# Patient Record
Sex: FEMALE | Race: WHITE | URBAN - METROPOLITAN AREA
[De-identification: names, ages, dates, MRNs, and addresses within clinical notes are randomized per-mention and may not be internally consistent; named-entity substitution may affect disease eponyms.]

---

## 2021-10-12 ENCOUNTER — OFFICE VISIT (OUTPATIENT)
Dept: FAMILY MEDICINE CLINIC | Age: 36
End: 2021-10-12
Payer: OTHER GOVERNMENT

## 2021-10-12 VITALS
BODY MASS INDEX: 34.8 KG/M2 | HEIGHT: 63 IN | TEMPERATURE: 98 F | OXYGEN SATURATION: 95 % | RESPIRATION RATE: 16 BRPM | HEART RATE: 103 BPM | SYSTOLIC BLOOD PRESSURE: 123 MMHG | WEIGHT: 196.4 LBS | DIASTOLIC BLOOD PRESSURE: 89 MMHG

## 2021-10-12 DIAGNOSIS — Z76.89 ENCOUNTER TO ESTABLISH CARE: ICD-10-CM

## 2021-10-12 DIAGNOSIS — E55.9 VITAMIN D DEFICIENCY: ICD-10-CM

## 2021-10-12 DIAGNOSIS — I10 PRIMARY HYPERTENSION: ICD-10-CM

## 2021-10-12 DIAGNOSIS — Z86.718 HISTORY OF BLOOD CLOTS: ICD-10-CM

## 2021-10-12 DIAGNOSIS — M21.372 FOOT DROP, LEFT: ICD-10-CM

## 2021-10-12 DIAGNOSIS — Z13.220 SCREENING, LIPID: ICD-10-CM

## 2021-10-12 DIAGNOSIS — G62.9 NEUROPATHY: ICD-10-CM

## 2021-10-12 DIAGNOSIS — Z00.00 ANNUAL PHYSICAL EXAM: Primary | ICD-10-CM

## 2021-10-12 PROBLEM — Z90.49 HISTORY OF APPENDECTOMY: Status: ACTIVE | Noted: 2021-10-12

## 2021-10-12 PROBLEM — Q89.01 SPLEEN ABSENT: Status: ACTIVE | Noted: 2021-10-12

## 2021-10-12 PROCEDURE — 99385 PREV VISIT NEW AGE 18-39: CPT | Performed by: NURSE PRACTITIONER

## 2021-10-12 RX ORDER — OMEPRAZOLE 40 MG/1
CAPSULE, DELAYED RELEASE ORAL
COMMUNITY
End: 2022-05-11

## 2021-10-12 RX ORDER — HYDROCHLOROTHIAZIDE 25 MG/1
25 TABLET ORAL DAILY
COMMUNITY
End: 2022-08-09 | Stop reason: SDUPTHER

## 2021-10-12 NOTE — PATIENT INSTRUCTIONS
High Blood Pressure: Care Instructions  Overview     It's normal for blood pressure to go up and down throughout the day. But if it stays up, you have high blood pressure. Another name for high blood pressure is hypertension. Despite what a lot of people think, high blood pressure usually doesn't cause headaches or make you feel dizzy or lightheaded. It usually has no symptoms. But it does increase your risk of stroke, heart attack, and other problems. You and your doctor will talk about your risks of these problems based on your blood pressure. Your doctor will give you a goal for your blood pressure. Your goal will be based on your health and your age. Lifestyle changes, such as eating healthy and being active, are always important to help lower blood pressure. You might also take medicine to reach your blood pressure goal.  Follow-up care is a key part of your treatment and safety. Be sure to make and go to all appointments, and call your doctor if you are having problems. It's also a good idea to know your test results and keep a list of the medicines you take. How can you care for yourself at home? Medical treatment  · If you stop taking your medicine, your blood pressure will go back up. You may take one or more types of medicine to lower your blood pressure. Be safe with medicines. Take your medicine exactly as prescribed. Call your doctor if you think you are having a problem with your medicine. · Talk to your doctor before you start taking aspirin every day. Aspirin can help certain people lower their risk of a heart attack or stroke. But taking aspirin isn't right for everyone, because it can cause serious bleeding. · See your doctor regularly. You may need to see the doctor more often at first or until your blood pressure comes down. · If you are taking blood pressure medicine, talk to your doctor before you take decongestants or anti-inflammatory medicine, such as ibuprofen.  Some of these medicines can raise blood pressure. · Learn how to check your blood pressure at home. Lifestyle changes  · Stay at a healthy weight. This is especially important if you put on weight around the waist. Losing even 10 pounds can help you lower your blood pressure. · If your doctor recommends it, get more exercise. Walking is a good choice. Bit by bit, increase the amount you walk every day. Try for at least 30 minutes on most days of the week. You also may want to swim, bike, or do other activities. · Avoid or limit alcohol. Talk to your doctor about whether you can drink any alcohol. · Try to limit how much sodium you eat to less than 2,300 milligrams (mg) a day. Your doctor may ask you to try to eat less than 1,500 mg a day. · Eat plenty of fruits (such as bananas and oranges), vegetables, legumes, whole grains, and low-fat dairy products. · Lower the amount of saturated fat in your diet. Saturated fat is found in animal products such as milk, cheese, and meat. Limiting these foods may help you lose weight and also lower your risk for heart disease. · Do not smoke. Smoking increases your risk for heart attack and stroke. If you need help quitting, talk to your doctor about stop-smoking programs and medicines. These can increase your chances of quitting for good. When should you call for help? Call 911  anytime you think you may need emergency care. This may mean having symptoms that suggest that your blood pressure is causing a serious heart or blood vessel problem. Your blood pressure may be over 180/120. For example, call 911 if:    · You have symptoms of a heart attack. These may include:  ? Chest pain or pressure, or a strange feeling in the chest.  ? Sweating. ? Shortness of breath. ? Nausea or vomiting. ? Pain, pressure, or a strange feeling in the back, neck, jaw, or upper belly or in one or both shoulders or arms. ? Lightheadedness or sudden weakness.   ? A fast or irregular heartbeat.     · You have symptoms of a stroke. These may include:  ? Sudden numbness, tingling, weakness, or loss of movement in your face, arm, or leg, especially on only one side of your body. ? Sudden vision changes. ? Sudden trouble speaking. ? Sudden confusion or trouble understanding simple statements. ? Sudden problems with walking or balance. ? A sudden, severe headache that is different from past headaches.     · You have severe back or belly pain. Do not wait until your blood pressure comes down on its own. Get help right away. Call your doctor now or seek immediate care if:    · Your blood pressure is much higher than normal (such as 180/120 or higher), but you don't have symptoms.     · You think high blood pressure is causing symptoms, such as:  ? Severe headache.  ? Blurry vision. Watch closely for changes in your health, and be sure to contact your doctor if:    · Your blood pressure measures higher than your doctor recommends at least 2 times. That means the top number is higher or the bottom number is higher, or both.     · You think you may be having side effects from your blood pressure medicine. Where can you learn more? Go to http://www.gray.com/  Enter [id number] in the search box to learn more about \"High Blood Pressure: Care Instructions. \"  Current as of: April 29, 2021               Content Version: 13.0  © 2006-2021 [employer]. Care instructions adapted under license by [employer] (which disclaims liability or warranty for this information). If you have questions about a medical condition or this instruction, always ask your healthcare professional. [place] [address] any warranty or liability for your use of this information.

## 2021-10-12 NOTE — PROGRESS NOTES
Assessment/Plan:     Diagnoses and all orders for this visit:    1. Annual physical exam  -     CBC WITH AUTOMATED DIFF; Future  -     URINALYSIS W/ RFLX MICROSCOPIC; Future  - Stable, preventative screenings discussed will get labs today and referrals as requested to establish after moving. Had patient fill out release of records     2. Neuropathy  -     REFERRAL TO NEUROLOGY  - Unchanged, referral to neurology to get established after moving to [facility]    3. Foot drop, left  -     REFERRAL TO NEUROLOGY  - As above    4. Vitamin D deficiency  -     VITAMIN D, 25 HYDROXY; Future  -Presumed stable labs today     5. Primary hypertension  -     METABOLIC PANEL, COMPREHENSIVE; Future  -     MICROALBUMIN, UR, RAND W/ MICROALB/CREAT RATIO; Future  -Stable, continue current therapy  labs today    6. Encounter to establish care    7. Screening, lipid  -     LIPID PANEL; Future  - Presumed stable labs today    8. History of blood clots  -     rivaroxaban (Xarelto) 20 mg tab tablet; Take 1 Tablet by mouth daily.  - Stable, continue current therapy refill today            Discussed expected course/resolution/complications of diagnosis in detail with patient. Medication risks/benefits/costs/interactions/alternatives discussed with patient. Pt was given after visit summary which includes diagnoses, current medications & vitals. Pt expressed understanding with the diagnosis and plan        Subjective:      Stan Temple is a 31 y.o. female who presents for had concerns including Establish Care and Other (Patient requesting a referral for Nuerology ). About 2 years ago had a medical emergency, found unresponsive, intubated for 40 days, 3 months in rehab. Had swelling in brain, fluid in lungs. History  of pancreatitis from alcohol use. When woke up from rehab had neuropathy in hands and legs, worse in the left. 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 Moved here from [place]. No records present at visit    Annual physical.  Has not seen dentist in a couple of years. Vision exam up to date    Plans to establish with GYN. BP at goal today. Current Outpatient Medications   Medication Sig Dispense Refill    omeprazole (PRILOSEC) 40 mg capsule omeprazole 40 mg capsule,delayed release      hydroCHLOROthiazide (HYDRODIURIL) 25 mg tablet Take 25 mg by mouth daily.  rivaroxaban (Xarelto) 20 mg tab tablet Take 1 Tablet by mouth daily. 90 Tablet 1       No Known Allergies  History reviewed. No pertinent past medical history. History reviewed. No pertinent surgical history. Family History   Problem Relation Age of Onset    Ovarian Cancer Mother     Hypertension Father      Social History     Socioeconomic History    Marital status:      Spouse name: Not on file    Number of children: Not on file    Years of education: Not on file    Highest education level: Not on file   Occupational History    Not on file   Tobacco Use    Smoking status: Never Smoker    Smokeless tobacco: Never Used   Vaping Use    Vaping Use: Never used   Substance and Sexual Activity    Alcohol use: Yes     Alcohol/week: 3.0 standard drinks     Types: 1 Glasses of wine, 1 Cans of beer, 1 Shots of liquor per week     Comment: ocassionally    Drug use: Never    Sexual activity: Not on file   Other Topics Concern    Not on file   Social History Narrative    Not on file     Social Determinants of Health     Financial Resource Strain:     Difficulty of Paying Living Expenses:    Food Insecurity:     Worried About Running Out of Food in the Last Year:     Ran Out of Food in the Last Year:    Transportation Needs:     Lack of Transportation (Medical):      Lack of Transportation (Non-Medical):    Physical Activity:     Days of Exercise per Week:     Minutes of Exercise per Session:    Stress:     Feeling of Stress :    Social Connections:  Frequency of Communication with Friends and Family:     Frequency of Social Gatherings with Friends and Family:     Attends Yarsanism Services:     Active Member of Clubs or Organizations:     Attends Club or Organization Meetings:     Marital Status:    Intimate Partner Violence:     Fear of Current or Ex-Partner:     Emotionally Abused:     Physically Abused:     Sexually Abused:        HPI      ROS:   Review of Systems   Constitutional: Negative for chills, fever and malaise/fatigue. Eyes: Negative for blurred vision. Respiratory: Negative for cough and shortness of breath. Cardiovascular: Negative for chest pain, palpitations and leg swelling. Genitourinary: Negative for dysuria and urgency. Neurological: Positive for weakness. Negative for dizziness and headaches. Objective:     Visit Vitals  /89 (BP 1 Location: Left upper arm, BP Patient Position: Sitting, BP Cuff Size: Adult long)   Pulse (!) 103   Temp 98 °F (36.7 °C) (Temporal)   Resp 16   Ht 5' 3\" (1.6 m)   Wt 196 lb 6.4 oz (89.1 kg)   SpO2 95%   BMI 34.79 kg/m²         Vitals and Nurse Documentation reviewed. Physical Exam  Constitutional:       Appearance: Normal appearance. HENT:      Head: Normocephalic and atraumatic. Right Ear: Tympanic membrane normal.      Left Ear: Tympanic membrane normal.      Nose: Nose normal.      Mouth/Throat:      Dentition: Normal dentition. Eyes:      General:         Right eye: No discharge. Left eye: No discharge. Conjunctiva/sclera:      Right eye: Right conjunctiva is not injected. Left eye: Left conjunctiva is not injected. Pupils: Pupils are equal, round, and reactive to light. Neck:      Thyroid: No thyroid mass or thyromegaly. Vascular: No carotid bruit. Cardiovascular:      Rate and Rhythm: Normal rate and regular rhythm. Pulses:           Dorsalis pedis pulses are 2+ on the right side and 2+ on the left side.         Posterior tibial pulses are 2+ on the right side and 2+ on the left side. Heart sounds: S1 normal and S2 normal. No murmur heard. No friction rub. No gallop. Pulmonary:      Breath sounds: Normal breath sounds. Abdominal:      General: Bowel sounds are normal. There is no distension. Palpations: There is no mass. Tenderness: There is no abdominal tenderness. Musculoskeletal:         General: Normal range of motion. Cervical back: Neck supple. Lymphadenopathy:      Cervical: No cervical adenopathy. Skin:     General: Skin is warm and dry. Findings: No rash. Neurological:      Mental Status: She is alert. Cranial Nerves: Cranial nerves are intact. Sensory: Sensation is intact. Motor: Weakness present. Gait: Gait abnormal.      Comments: Using walker today, brace on left lower extremity   Psychiatric:         Mood and Affect: Mood and affect normal.         No results found for this or any previous visit.

## 2021-10-12 NOTE — PROGRESS NOTES
Identified pt with two pt identifiers(name and ). Reviewed record in preparation for visit and have obtained necessary documentation. Chief Complaint   Patient presents with   [facility] Other     Patient requesting a referral for [facility] Due   Topic    Hepatitis C Screening     COVID-19 Vaccine (1)    DTaP/Tdap/Td series (1 - Tdap)    Cervical cancer screen     Flu Vaccine (1)       Coordination of Care Questionnaire:  :   1) Have you been to an emergency room, urgent care, or hospitalized since your last visit? If yes, where when, and reason for visit? no    2. Have seen or consulted any other health care provider since your last visit? If yes, where when, and reason for visit?  no        Patient is accompanied by self I have received verbal consent from Debra Borjas to discuss any/all medical information while they are present in the room.

## 2021-10-18 LAB
25(OH)D3 SERPL-MCNC: 38.6 NG/ML (ref 30–100)
ALBUMIN SERPL-MCNC: 3.8 G/DL (ref 3.5–5)
ALBUMIN/GLOB SERPL: 1 {RATIO} (ref 1.1–2.2)
ALP SERPL-CCNC: 188 U/L (ref 45–117)
ALT SERPL-CCNC: 187 U/L (ref 12–78)
ANION GAP SERPL CALC-SCNC: 14 MMOL/L (ref 5–15)
APPEARANCE UR: CLEAR
AST SERPL-CCNC: 202 U/L (ref 15–37)
BACTERIA URNS QL MICRO: ABNORMAL /HPF
BASOPHILS # BLD: 0.1 K/UL (ref 0–0.1)
BASOPHILS NFR BLD: 1 % (ref 0–1)
BILIRUB SERPL-MCNC: 1 MG/DL (ref 0.2–1)
BILIRUB UR QL: NEGATIVE
BUN SERPL-MCNC: 8 MG/DL (ref 6–20)
BUN/CREAT SERPL: 11 (ref 12–20)
CALCIUM SERPL-MCNC: 9.6 MG/DL (ref 8.5–10.1)
CHLORIDE SERPL-SCNC: 85 MMOL/L (ref 97–108)
CHOLEST SERPL-MCNC: 163 MG/DL
CO2 SERPL-SCNC: 22 MMOL/L (ref 21–32)
COLOR UR: ABNORMAL
CREAT SERPL-MCNC: 0.72 MG/DL (ref 0.55–1.02)
CREAT UR-MCNC: 310 MG/DL
DIFFERENTIAL METHOD BLD: ABNORMAL
EOSINOPHIL # BLD: 0 K/UL (ref 0–0.4)
EOSINOPHIL NFR BLD: 0 % (ref 0–7)
EPITH CASTS URNS QL MICRO: ABNORMAL /LPF
ERYTHROCYTE [DISTWIDTH] IN BLOOD BY AUTOMATED COUNT: 19.4 % (ref 11.5–14.5)
GLOBULIN SER CALC-MCNC: 4 G/DL (ref 2–4)
GLUCOSE SERPL-MCNC: 133 MG/DL (ref 65–100)
GLUCOSE UR STRIP.AUTO-MCNC: NEGATIVE MG/DL
HCT VFR BLD AUTO: 34.4 % (ref 35–47)
HDLC SERPL-MCNC: 103 MG/DL
HDLC SERPL: 1.6 {RATIO} (ref 0–5)
HGB BLD-MCNC: 11.1 G/DL (ref 11.5–16)
HGB UR QL STRIP: ABNORMAL
HYALINE CASTS URNS QL MICRO: ABNORMAL /LPF (ref 0–5)
IMM GRANULOCYTES # BLD AUTO: 0 K/UL (ref 0–0.04)
IMM GRANULOCYTES NFR BLD AUTO: 1 % (ref 0–0.5)
KETONES UR QL STRIP.AUTO: 15 MG/DL
LDLC SERPL CALC-MCNC: 49.4 MG/DL (ref 0–100)
LEUKOCYTE ESTERASE UR QL STRIP.AUTO: ABNORMAL
LYMPHOCYTES # BLD: 1.1 K/UL (ref 0.8–3.5)
LYMPHOCYTES NFR BLD: 13 % (ref 12–49)
MCH RBC QN AUTO: 23.8 PG (ref 26–34)
MCHC RBC AUTO-ENTMCNC: 32.3 G/DL (ref 30–36.5)
MCV RBC AUTO: 73.7 FL (ref 80–99)
MICROALBUMIN UR-MCNC: 20.9 MG/DL
MICROALBUMIN/CREAT UR-RTO: 67 MG/G (ref 0–30)
MONOCYTES # BLD: 1.2 K/UL (ref 0–1)
MONOCYTES NFR BLD: 14 % (ref 5–13)
NEUTS SEG # BLD: 6 K/UL (ref 1.8–8)
NEUTS SEG NFR BLD: 71 % (ref 32–75)
NITRITE UR QL STRIP.AUTO: NEGATIVE
NRBC # BLD: 0 K/UL (ref 0–0.01)
NRBC BLD-RTO: 0 PER 100 WBC
PH UR STRIP: 7 [PH] (ref 5–8)
PLATELET # BLD AUTO: 338 K/UL (ref 150–400)
PMV BLD AUTO: 11.1 FL (ref 8.9–12.9)
POTASSIUM SERPL-SCNC: 3.2 MMOL/L (ref 3.5–5.1)
PROT SERPL-MCNC: 7.8 G/DL (ref 6.4–8.2)
PROT UR STRIP-MCNC: 100 MG/DL
RBC # BLD AUTO: 4.67 M/UL (ref 3.8–5.2)
RBC #/AREA URNS HPF: ABNORMAL /HPF (ref 0–5)
SODIUM SERPL-SCNC: 121 MMOL/L (ref 136–145)
SP GR UR REFRACTOMETRY: 1.02 (ref 1–1.03)
TRIGL SERPL-MCNC: 53 MG/DL (ref ?–150)
UROBILINOGEN UR QL STRIP.AUTO: 0.2 EU/DL (ref 0.2–1)
VLDLC SERPL CALC-MCNC: 10.6 MG/DL
WBC # BLD AUTO: 8.4 K/UL (ref 3.6–11)
WBC URNS QL MICRO: ABNORMAL /HPF (ref 0–4)

## 2021-10-19 NOTE — PROGRESS NOTES
Please have her make a follow-up appointment for elevated liver enzymes, protein in her urine elevated blood sugar and have her hold the hydrochlorothiazide for couple days as her sodium and potassium are low. She needs to get them within a week.   And monitor her blood pressure while she is off the hydrochlorothiazide

## 2021-10-29 ENCOUNTER — DOCUMENTATION ONLY (OUTPATIENT)
Dept: FAMILY MEDICINE CLINIC | Age: 36
End: 2021-10-29

## 2021-11-03 NOTE — PROGRESS NOTES
Authorization Health Release Forms completed and faxed by 10/29/2021 to   [facility] Physician- (f): [phone]    Confirmation: OK

## 2022-02-08 ENCOUNTER — OFFICE VISIT (OUTPATIENT)
Dept: FAMILY MEDICINE CLINIC | Age: 37
End: 2022-02-08
Payer: OTHER GOVERNMENT

## 2022-02-08 VITALS
HEIGHT: 63 IN | SYSTOLIC BLOOD PRESSURE: 129 MMHG | RESPIRATION RATE: 16 BRPM | BODY MASS INDEX: 31.36 KG/M2 | OXYGEN SATURATION: 97 % | TEMPERATURE: 97.9 F | DIASTOLIC BLOOD PRESSURE: 92 MMHG | WEIGHT: 177 LBS | HEART RATE: 101 BPM

## 2022-02-08 DIAGNOSIS — D64.9 ANEMIA, UNSPECIFIED TYPE: ICD-10-CM

## 2022-02-08 DIAGNOSIS — I10 PRIMARY HYPERTENSION: ICD-10-CM

## 2022-02-08 DIAGNOSIS — R73.01 IMPAIRED FASTING GLUCOSE: ICD-10-CM

## 2022-02-08 DIAGNOSIS — N92.6 ABNORMAL MENSTRUAL PERIODS: Primary | ICD-10-CM

## 2022-02-08 DIAGNOSIS — E87.6 HYPOKALEMIA: ICD-10-CM

## 2022-02-08 PROCEDURE — 99214 OFFICE O/P EST MOD 30 MIN: CPT | Performed by: NURSE PRACTITIONER

## 2022-02-08 RX ORDER — CHOLECALCIFEROL (VITAMIN D3) 25 MCG
TABLET,CHEWABLE ORAL
COMMUNITY
End: 2022-08-09 | Stop reason: ALTCHOICE

## 2022-02-08 NOTE — PROGRESS NOTES
Assessment/Plan:     Diagnoses and all orders for this visit:    1. Abnormal menstrual periods  -     REFERRAL TO GYNECOLOGY  - Worsening, referral to GYN for further evaluation and treatment. Advised patient on the supplement vitex berry to try    2. Anemia, unspecified type  -     CBC WITH AUTOMATED DIFF; Future  - Presumed stable labs today    3. Impaired fasting glucose  -     HEMOGLOBIN A1C WITH EAG; Future  -     CBC WITH AUTOMATED DIFF; Future  - Presumed stable labs today    4. Hypokalemia  -     METABOLIC PANEL, COMPREHENSIVE; Future  - Presumed stable labs today    5. Primary hypertension  - Elevated today, advised patient to check blood pressure for 3 to 4 weeks and follow-up in 4 weeks with blood pressure readings. Follow-up and Dispositions    · Return in about 4 weeks (around 3/8/2022) for Follow Up. Discussed expected course/resolution/complications of diagnosis in detail with patient. Medication risks/benefits/costs/interactions/alternatives discussed with patient. Pt was given after visit summary which includes diagnoses, current medications & vitals. Pt expressed understanding with the diagnosis and plan        Subjective:      Mac Kumari is a 31 y.o. female who presents for had concerns including Labs (requesting blood work- ) and Other (Requsting a referral for Nuerologist ). Here today for follow up and lab work    History of foot drop needs to see neurology    States not having a regular period, will have an extremely light period every month. Feels like hormones are very bad. Going on 3-4 months. More emotional.  States has taken numerous pregnancy test and normal.   Not on birth control, has a 8year old    HTN  Elevated today. Checks at home. 120/90. States has been stressed due to  is going to [place].    Denies CP, SOB, palpitations, leg swelling    Current Outpatient Medications   Medication Sig Dispense Refill    cyanocobalamin, vitamin B-12, 2,500 mcg tab tablet cyanocobalamin (vitamin B-12) 2,500 mcg tablet   Take 1 tablet every day by oral route.  omeprazole (PRILOSEC) 40 mg capsule omeprazole 40 mg capsule,delayed release      hydroCHLOROthiazide (HYDRODIURIL) 25 mg tablet Take 25 mg by mouth daily.  rivaroxaban (Xarelto) 20 mg tab tablet Take 1 Tablet by mouth daily. 90 Tablet 1       Allergies   Allergen Reactions    Other Medication Hives     Sulfa drugs     History reviewed. No pertinent past medical history. History reviewed. No pertinent surgical history. Family History   Problem Relation Age of Onset    Ovarian Cancer Mother     Hypertension Father      Social History     Socioeconomic History    Marital status:      Spouse name: Not on file    Number of children: Not on file    Years of education: Not on file    Highest education level: Not on file   Occupational History    Not on file   Tobacco Use    Smoking status: Never Smoker    Smokeless tobacco: Never Used   Vaping Use    Vaping Use: Never used   Substance and Sexual Activity    Alcohol use: Yes     Alcohol/week: 3.0 standard drinks     Types: 1 Glasses of wine, 1 Cans of beer, 1 Shots of liquor per week     Comment: ocassionally    Drug use: Never    Sexual activity: Not on file   Other Topics Concern    Not on file   Social History Narrative    Not on file     Social Determinants of Health     Financial Resource Strain:     Difficulty of Paying Living Expenses: Not on file   Food Insecurity:     Worried About Running Out of Food in the Last Year: Not on file    Michael of Food in the Last Year: Not on file   Transportation Needs:     Lack of Transportation (Medical): Not on file    Lack of Transportation (Non-Medical):  Not on file   Physical Activity:     Days of Exercise per Week: Not on file    Minutes of Exercise per Session: Not on file   Stress:     Feeling of Stress : Not on file   Social Connections:     Frequency of Communication with Friends and Family: Not on file    Frequency of Social Gatherings with Friends and Family: Not on file    Attends Religion Services: Not on file    Active Member of Clubs or Organizations: Not on file    Attends Club or Organization Meetings: Not on file    Marital Status: Not on file   Intimate Partner Violence:     Fear of Current or Ex-Partner: Not on file    Emotionally Abused: Not on file    Physically Abused: Not on file    Sexually Abused: Not on file   Housing Stability:     Unable to Pay for Housing in the Last Year: Not on file    Number of [place] in the Last Year: Not on file    Unstable Housing in the Last Year: Not on file       HPI      ROS:   Review of Systems   Constitutional: Negative for chills, fever and malaise/fatigue. Eyes: Negative for blurred vision. Respiratory: Negative for cough and shortness of breath. Cardiovascular: Negative for chest pain, palpitations and leg swelling. Neurological: Negative for dizziness and headaches. Psychiatric/Behavioral:        \"emotional\"       Objective:     Visit Vitals  BP (!) 129/92 (BP 1 Location: Left upper arm, BP Patient Position: Sitting, BP Cuff Size: Adult)   Pulse (!) 101   Temp 97.9 °F (36.6 °C) (Temporal)   Resp 16   Ht 5' 3\" (1.6 m)   Wt 177 lb (80.3 kg)   SpO2 97%   BMI 31.35 kg/m²         Vitals and Nurse Documentation reviewed. Physical Exam  Constitutional:       General: She is not in acute distress. Appearance: She is not diaphoretic. HENT:      Head: Normocephalic and atraumatic. Cardiovascular:      Rate and Rhythm: Normal rate and regular rhythm. Heart sounds: Normal heart sounds. No murmur heard. No friction rub. No gallop. Pulmonary:      Effort: Pulmonary effort is normal. No respiratory distress. Breath sounds: Normal breath sounds. No wheezing or rales. Skin:     General: Skin is warm and dry. Coloration: Skin is not pale.    Neurological:      Mental Status: She is alert and oriented to person, place, and time. Psychiatric:         Mood and Affect: Affect normal. Mood is not anxious or depressed. Behavior: Behavior is not agitated. Thought Content: Thought content does not include homicidal or suicidal ideation. Results for orders placed or performed in visit on 78/23/20   METABOLIC PANEL, COMPREHENSIVE   Result Value Ref Range    Sodium 132 (L) 136 - 145 mmol/L    Potassium 4.5 3.5 - 5.1 mmol/L    Chloride 96 (L) 97 - 108 mmol/L    CO2 26 21 - 32 mmol/L    Anion gap 10 5 - 15 mmol/L    Glucose 114 (H) 65 - 100 mg/dL    BUN 9 6 - 20 MG/DL    Creatinine 0.72 0.55 - 1.02 MG/DL    BUN/Creatinine ratio 13 12 - 20      GFR est AA >60 >60 ml/min/1.73m2    GFR est non-AA >60 >60 ml/min/1.73m2    Calcium 10.0 8.5 - 10.1 MG/DL    Bilirubin, total 1.6 (H) 0.2 - 1.0 MG/DL    ALT (SGPT) 66 12 - 78 U/L    AST (SGOT) 97 (H) 15 - 37 U/L    Alk. phosphatase 136 (H) 45 - 117 U/L    Protein, total 8.4 (H) 6.4 - 8.2 g/dL    Albumin 4.3 3.5 - 5.0 g/dL    Globulin 4.1 (H) 2.0 - 4.0 g/dL    A-G Ratio 1.0 (L) 1.1 - 2.2     CBC WITH AUTOMATED DIFF   Result Value Ref Range    WBC 10.4 3.6 - 11.0 K/uL    RBC 4.25 3.80 - 5.20 M/uL    HGB 11.9 11.5 - 16.0 g/dL    HCT 38.4 35.0 - 47.0 %    MCV 90.4 80.0 - 99.0 FL    MCH 28.0 26.0 - 34.0 PG    MCHC 31.0 30.0 - 36.5 g/dL    RDW 20.3 (H) 11.5 - 14.5 %    PLATELET 247 (H) 505 - 400 K/uL    MPV 11.3 8.9 - 12.9 FL    NRBC 0.0 0  WBC    ABSOLUTE NRBC 0.00 0.00 - 0.01 K/uL    NEUTROPHILS 65 32 - 75 %    LYMPHOCYTES 21 12 - 49 %    MONOCYTES 13 5 - 13 %    EOSINOPHILS 0 0 - 7 %    BASOPHILS 1 0 - 1 %    IMMATURE GRANULOCYTES 0 0.0 - 0.5 %    ABS. NEUTROPHILS 6.7 1.8 - 8.0 K/UL    ABS. LYMPHOCYTES 2.2 0.8 - 3.5 K/UL    ABS. MONOCYTES 1.4 (H) 0.0 - 1.0 K/UL    ABS. EOSINOPHILS 0.0 0.0 - 0.4 K/UL    ABS. BASOPHILS 0.1 0.0 - 0.1 K/UL    ABS. IMM.  GRANS. 0.0 0.00 - 0.04 K/UL    DF SMEAR SCANNED      RBC COMMENTS ANISOCYTOSIS  2+ RBC COMMENTS POIKILOCYTOSIS  PRESENT        RBC COMMENTS KVNG CELLS  PRESENT       HEMOGLOBIN A1C WITH EAG   Result Value Ref Range    Hemoglobin A1c 5.7 (H) 4.0 - 5.6 %    Est. average glucose 117 mg/dL

## 2022-02-08 NOTE — PROGRESS NOTES
Identified pt with two pt identifiers(name and ). Reviewed record in preparation for visit and have obtained necessary documentation. Chief Complaint   Patient presents with   [facility] [place]     requesting blood work-     Other     Requsting a referral for [employer] Due   Topic    Hepatitis C Screening     Cervical cancer screen     MenB Meningococcal topic (2 of 4 - Increased Risk Bexsero 2-dose series)    Flu Vaccine (1)    COVID-19 Vaccine (3 - Booster for Moderna series)       Coordination of Care Questionnaire:  :   1) Have you been to an emergency room, urgent care, or hospitalized since your last visit? If yes, where when, and reason for visit? no      2. Have seen or consulted any other health care provider since your last visit? If yes, where when, and reason for visit? no        Patient is accompanied by self I have received verbal consent from Fernando Baird to discuss any/all medical information while they are present in the room.

## 2022-02-08 NOTE — PATIENT INSTRUCTIONS
Anemia: Care Instructions  Your Care Instructions     Anemia is a low level of red blood cells, which carry oxygen throughout your body. Many things can cause anemia. Lack of iron is one of the most common causes. Your body needs iron to make hemoglobin, a substance in red blood cells that carries oxygen from the lungs to your body's cells. Without enough iron, the body produces fewer and smaller red blood cells. As a result, your body's cells do not get enough oxygen, and you feel tired and weak. And you may have trouble concentrating. Bleeding is the most common cause of a lack of iron. You may have heavy menstrual bleeding or bleeding caused by conditions such as ulcers, hemorrhoids, or cancer. Regular use of aspirin or other anti-inflammatory medicines (such as ibuprofen) also can cause bleeding in some people. A lack of iron in your diet also can cause anemia, especially at times when the body needs more iron, such as during pregnancy, infancy, and the teen years. Your doctor may have prescribed iron pills. It may take several months of treatment for your iron levels to return to normal. Your doctor also may suggest that you eat foods that are rich in iron, such as meat and beans. There are many other causes of anemia. It is not always due to a lack of iron. Finding the specific cause of your anemia will help your doctor find the right treatment for you. Follow-up care is a key part of your treatment and safety. Be sure to make and go to all appointments, and call your doctor if you are having problems. It's also a good idea to know your test results and keep a list of the medicines you take. How can you care for yourself at home? · Take your medicines exactly as prescribed. Call your doctor if you think you are having a problem with your medicine. · If your doctor recommends iron pills, take them as directed:  ? Try to take the pills on an empty stomach about 1 hour before or 2 hours after meals. But you may need to take iron with food to avoid an upset stomach. ? Do not take antacids or drink milk or caffeine drinks (such as coffee, tea, or cola) at the same time or within 2 hours of the time that you take your iron. They can make it hard for your body to absorb the iron. ? Vitamin C (from food or supplements) helps your body absorb iron. Try taking iron pills with a glass of orange juice or some other food that is high in vitamin C, such as citrus fruits. ? Iron pills may cause stomach problems, such as heartburn, nausea, diarrhea, constipation, and cramps. Be sure to drink plenty of fluids, and include fruits, vegetables, and fiber in your diet each day. Iron pills often make your bowel movements dark or green. ? If you forget to take an iron pill, do not take a double dose of iron the next time you take a pill. ? Keep iron pills out of the reach of small children. An overdose of iron can be very dangerous. · Follow your doctor's advice about eating iron-rich foods. These include red meat, shellfish, poultry, eggs, beans, raisins, whole-grain bread, and leafy green vegetables. · Steam vegetables to help them keep their iron content. When should you call for help? Call 911 anytime you think you may need emergency care. For example, call if:    · You have symptoms of a heart attack. These may include:  ? Chest pain or pressure, or a strange feeling in the chest.  ? Sweating. ? Shortness of breath. ? Nausea or vomiting. ? Pain, pressure, or a strange feeling in the back, neck, jaw, or upper belly or in one or both shoulders or arms. ? Lightheadedness or sudden weakness. ? A fast or irregular heartbeat. After you call 911, the  may tell you to chew 1 adult-strength or 2 to 4 low-dose aspirin. Wait for an ambulance. Do not try to drive yourself.     · You passed out (lost consciousness).    Call your doctor now or seek immediate medical care if:    · You have new or increased shortness of breath.     · You are dizzy or lightheaded, or you feel like you may faint.     · Your fatigue and weakness continue or get worse.     · You have any abnormal bleeding, such as:  ? Nosebleeds. ? Vaginal bleeding that is different (heavier, more frequent, at a different time of the month) than what you are used to.  ? Bloody or black stools, or rectal bleeding. ? Bloody or pink urine. Watch closely for changes in your health, and be sure to contact your doctor if:    · You do not get better as expected. Where can you learn more? Go to http://www.stevens.com/  Enter R301 in the search box to learn more about \"Anemia: Care Instructions. \"  Current as of: April 29, 2021               Content Version: 13.0  © 8697-8538 [employer], [employer]. Care instructions adapted under license by [employer] (which disclaims liability or warranty for this information). If you have questions about a medical condition or this instruction, always ask your healthcare professional. [place] [address] any warranty or liability for your use of this information.

## 2022-02-09 LAB
ALBUMIN SERPL-MCNC: 4.3 G/DL (ref 3.5–5)
ALBUMIN/GLOB SERPL: 1 {RATIO} (ref 1.1–2.2)
ALP SERPL-CCNC: 136 U/L (ref 45–117)
ALT SERPL-CCNC: 66 U/L (ref 12–78)
ANION GAP SERPL CALC-SCNC: 10 MMOL/L (ref 5–15)
AST SERPL-CCNC: 97 U/L (ref 15–37)
BASOPHILS # BLD: 0.1 K/UL (ref 0–0.1)
BASOPHILS NFR BLD: 1 % (ref 0–1)
BILIRUB SERPL-MCNC: 1.6 MG/DL (ref 0.2–1)
BUN SERPL-MCNC: 9 MG/DL (ref 6–20)
BUN/CREAT SERPL: 13 (ref 12–20)
CALCIUM SERPL-MCNC: 10 MG/DL (ref 8.5–10.1)
CHLORIDE SERPL-SCNC: 96 MMOL/L (ref 97–108)
CO2 SERPL-SCNC: 26 MMOL/L (ref 21–32)
CREAT SERPL-MCNC: 0.72 MG/DL (ref 0.55–1.02)
DIFFERENTIAL METHOD BLD: ABNORMAL
EOSINOPHIL # BLD: 0 K/UL (ref 0–0.4)
EOSINOPHIL NFR BLD: 0 % (ref 0–7)
ERYTHROCYTE [DISTWIDTH] IN BLOOD BY AUTOMATED COUNT: 20.3 % (ref 11.5–14.5)
EST. AVERAGE GLUCOSE BLD GHB EST-MCNC: 117 MG/DL
GLOBULIN SER CALC-MCNC: 4.1 G/DL (ref 2–4)
GLUCOSE SERPL-MCNC: 114 MG/DL (ref 65–100)
HBA1C MFR BLD: 5.7 % (ref 4–5.6)
HCT VFR BLD AUTO: 38.4 % (ref 35–47)
HGB BLD-MCNC: 11.9 G/DL (ref 11.5–16)
IMM GRANULOCYTES # BLD AUTO: 0 K/UL (ref 0–0.04)
IMM GRANULOCYTES NFR BLD AUTO: 0 % (ref 0–0.5)
LYMPHOCYTES # BLD: 2.2 K/UL (ref 0.8–3.5)
LYMPHOCYTES NFR BLD: 21 % (ref 12–49)
MCH RBC QN AUTO: 28 PG (ref 26–34)
MCHC RBC AUTO-ENTMCNC: 31 G/DL (ref 30–36.5)
MCV RBC AUTO: 90.4 FL (ref 80–99)
MONOCYTES # BLD: 1.4 K/UL (ref 0–1)
MONOCYTES NFR BLD: 13 % (ref 5–13)
NEUTS SEG # BLD: 6.7 K/UL (ref 1.8–8)
NEUTS SEG NFR BLD: 65 % (ref 32–75)
NRBC # BLD: 0 K/UL (ref 0–0.01)
NRBC BLD-RTO: 0 PER 100 WBC
PLATELET # BLD AUTO: 433 K/UL (ref 150–400)
PMV BLD AUTO: 11.3 FL (ref 8.9–12.9)
POTASSIUM SERPL-SCNC: 4.5 MMOL/L (ref 3.5–5.1)
PROT SERPL-MCNC: 8.4 G/DL (ref 6.4–8.2)
RBC # BLD AUTO: 4.25 M/UL (ref 3.8–5.2)
RBC MORPH BLD: ABNORMAL
SODIUM SERPL-SCNC: 132 MMOL/L (ref 136–145)
WBC # BLD AUTO: 10.4 K/UL (ref 3.6–11)

## 2022-02-14 NOTE — PROGRESS NOTES
Please let her know to add some salt to her diet as her sodium is low and I need her to make a follow-up appointment for elevated liver function test and we will also repeat sodium at that time

## 2022-02-16 ENCOUNTER — PATIENT MESSAGE (OUTPATIENT)
Dept: FAMILY MEDICINE CLINIC | Age: 37
End: 2022-02-16

## 2022-03-18 PROBLEM — Z86.718 HISTORY OF BLOOD CLOTS: Status: ACTIVE | Noted: 2021-10-12

## 2022-03-18 PROBLEM — Q89.01 SPLEEN ABSENT: Status: ACTIVE | Noted: 2021-10-12

## 2022-03-20 PROBLEM — Z90.49 HISTORY OF APPENDECTOMY: Status: ACTIVE | Noted: 2021-10-12

## 2022-05-09 ENCOUNTER — OFFICE VISIT (OUTPATIENT)
Dept: NEUROLOGY | Age: 37
End: 2022-05-09
Payer: OTHER GOVERNMENT

## 2022-05-09 VITALS
BODY MASS INDEX: 31.89 KG/M2 | DIASTOLIC BLOOD PRESSURE: 88 MMHG | WEIGHT: 180 LBS | HEART RATE: 104 BPM | OXYGEN SATURATION: 99 % | SYSTOLIC BLOOD PRESSURE: 136 MMHG

## 2022-05-09 DIAGNOSIS — G04.00 ADEM (ACUTE DISSEMINATED ENCEPHALOMYELITIS): Primary | ICD-10-CM

## 2022-05-09 PROCEDURE — 99204 OFFICE O/P NEW MOD 45 MIN: CPT | Performed by: SPECIALIST

## 2022-05-09 RX ORDER — BISMUTH SUBSALICYLATE 262 MG
1 TABLET,CHEWABLE ORAL DAILY
COMMUNITY

## 2022-05-09 RX ORDER — AMITRIPTYLINE HYDROCHLORIDE 25 MG/1
25 TABLET, FILM COATED ORAL
Qty: 30 TABLET | Refills: 3 | Status: SHIPPED | OUTPATIENT
Start: 2022-05-09 | End: 2022-06-20 | Stop reason: DRUGHIGH

## 2022-05-09 NOTE — PROGRESS NOTES
Neurology Consult      Subjective:      Sonny Marinelli is a 31 y.o. female who comes in today with the following history. Has information that will be coming from [place] on the acute happening that she experienced on December 18, 2019. Says she suddenly ended up with 10 days of coma and multisystem organ failure? From there she had diminished mobility with time and needed a splenectomy. Ended up with 40 days in the hospital and rehab for an additional 6 weeks. At the end of that Greta started making its appearance. Discharged in a wheelchair for over a year and then a walker. Uses now the walker only for long distance excursions out of the house and has a left leg brace. Has no ongoing therapy but goes to the gym. Has an element of right foot edge numbness only. On the left leg she has a flagrant left foot drop and essentially no meaningful foot or toe dorsiflexion capabilities. Says that there is a sensory level that dropped originally from around her left knee to just above the ankle and there is numb and pain qualities in the foot. Does not recall any antecedent illness or trauma or similar issues prior to the coma. Current Outpatient Medications   Medication Sig Dispense Refill    multivitamin (ONE A DAY) tablet Take 1 Tablet by mouth daily.  cyanocobalamin, vitamin B-12, 2,500 mcg tab tablet cyanocobalamin (vitamin B-12) 2,500 mcg tablet   Take 1 tablet every day by oral route.  hydroCHLOROthiazide (HYDRODIURIL) 25 mg tablet Take 25 mg by mouth daily.  rivaroxaban (Xarelto) 20 mg tab tablet Take 1 Tablet by mouth daily. 90 Tablet 1    omeprazole (PRILOSEC) 40 mg capsule omeprazole 40 mg capsule,delayed release        Allergies   Allergen Reactions    Other Medication Hives     Sulfa drugs     No past medical history on file. No past surgical history on file.    Social History     Socioeconomic History    Marital status:      Spouse name: Not on file    Number of children: Not on file    Years of education: Not on file    Highest education level: Not on file   Occupational History    Not on file   Tobacco Use    Smoking status: Never Smoker    Smokeless tobacco: Never Used   Vaping Use    Vaping Use: Never used   Substance and Sexual Activity    Alcohol use: Yes     Alcohol/week: 3.0 standard drinks     Types: 1 Glasses of wine, 1 Cans of beer, 1 Shots of liquor per week     Comment: ocassionally    Drug use: Never    Sexual activity: Not on file   Other Topics Concern    Not on file   Social History Narrative    Not on file     Social Determinants of Health     Financial Resource Strain:     Difficulty of Paying Living Expenses: Not on file   Food Insecurity:     Worried About Running Out of Food in the Last Year: Not on file    Michael of Food in the Last Year: Not on file   Transportation Needs:     Lack of Transportation (Medical): Not on file    Lack of Transportation (Non-Medical):  Not on file   Physical Activity:     Days of Exercise per Week: Not on file    Minutes of Exercise per Session: Not on file   Stress:     Feeling of Stress : Not on file   Social Connections:     Frequency of Communication with Friends and Family: Not on file    Frequency of Social Gatherings with Friends and Family: Not on file    Attends Anabaptism Services: Not on file    Active Member of [address] [place] [address] or Organizations: Not on file    Attends Club or Organization Meetings: Not on file    Marital Status: Not on file   Intimate Partner Violence:     Fear of Current or Ex-Partner: Not on file    Emotionally Abused: Not on file    Physically Abused: Not on file    Sexually Abused: Not on file   Housing Stability:     Unable to Pay for Housing in the Last Year: Not on file    Number of [place] in the Last Year: Not on file    Unstable Housing in the Last Year: Not on file      Family History   Problem Relation Age of Onset    Ovarian Cancer Mother     Hypertension Father       Visit Vitals  /88 (BP 1 Location: Left arm, BP Patient Position: Sitting, BP Cuff Size: Adult)   Pulse (!) 104   Wt 81.6 kg (180 lb)   SpO2 99%   BMI 31.89 kg/m²        Review of Systems:   A comprehensive review of systems was negative except for that written in the HPI. Neuro Exam:     Appearance: The patient is well developed, well nourished, provides a coherent history and is in no acute distress. Mental Status: Oriented to time, place and person. Mood and affect appropriate. Cranial Nerves:   Intact visual fields. Fundi are benign. CARLTON, EOM's full, no nystagmus, no ptosis. Facial sensation is normal. Corneal reflexes are intact. Facial movement is symmetric. Hearing is normal bilaterally. Palate is midline with normal sternocleidomastoid and trapezius muscles are normal. Tongue is midline. Motor:  5/5 strength in upper and in the lower proximal and distal muscles she is 5 - to 5 right leg distal worse than proximal and left leg is 5 - proximally to 3 - distally left leg and foot. . Normal bulk and tone on the right and diminished left leg. No fasciculations. Reflexes:   Deep tendon reflexes 1-22+/4 and symmetrical.   Sensory:    Diminished distally left leg and foot much greater than right. Gait:   Patient has a left leg brace and has steppage gait point-to-point left foot with floor contact. Tremor:   No tremor noted. Cerebellar:  No cerebellar signs present. Neurovascular:  Normal heart sounds and regular rhythm, peripheral pulses intact, and no carotid bruits. Assessment:   Acute disseminated encephalomyelitis? Purely a convenient label in terms of diagnosis at this point, until I get the actual information from her original hospital stay rehab etc.  Definitely has cord attributes by neuro exam.  We will put amitriptyline in place 25 mg at night for neuropathic type pain features.   Will refer to orthopedics foot specialist for further attention with left leg and foot rehab. Further suggestions could follow. Plan:   Revisit in about 7 weeks.   Signed by :  Junior Russell MD

## 2022-05-09 NOTE — PROGRESS NOTES
Started 2019, pt was found unresponsive in a coma for 42 days  Legs and hands numb, last check was about 1 year ago, reported her nerves were dead, Dr. Vivienne Thayer numb to mid shin, left foot

## 2022-05-09 NOTE — PATIENT INSTRUCTIONS
Patient history viewed patient examined. Very interesting case and await information from [place] as to the original evaluation and follow-through testing etc. Will refer to Ortho foot specialist regarding where the rehab with the left leg and foot goes. We will put on amitriptyline low-dose and warned about sedation and take it at night and we have plenty of room to go higher on the dose if and as needed. Further suggestions could follow.

## 2022-05-09 NOTE — LETTER
5/9/2022    Patient: Mario Vallejo   YOB: 1985   Date of Visit: 5/9/2022     Mikey De La Fuente NP  [address]    Dear Mikey De La Fuente NP,      Thank you for referring Ms. Mario Vallejo to [facility] for evaluation. My notes for this consultation are attached. If you have questions, please do not hesitate to call me. I look forward to following your patient along with you.       Sincerely,    Ming Sanford MD

## 2022-06-20 ENCOUNTER — TELEPHONE (OUTPATIENT)
Dept: FAMILY MEDICINE CLINIC | Age: 37
End: 2022-06-20

## 2022-06-20 ENCOUNTER — OFFICE VISIT (OUTPATIENT)
Dept: NEUROLOGY | Age: 37
End: 2022-06-20
Payer: OTHER GOVERNMENT

## 2022-06-20 VITALS
WEIGHT: 181 LBS | RESPIRATION RATE: 15 BRPM | HEART RATE: 100 BPM | OXYGEN SATURATION: 99 % | DIASTOLIC BLOOD PRESSURE: 84 MMHG | SYSTOLIC BLOOD PRESSURE: 126 MMHG | BODY MASS INDEX: 32.06 KG/M2

## 2022-06-20 DIAGNOSIS — G62.81 CRITICAL ILLNESS NEUROPATHY (HCC): Primary | ICD-10-CM

## 2022-06-20 PROCEDURE — 99214 OFFICE O/P EST MOD 30 MIN: CPT | Performed by: SPECIALIST

## 2022-06-20 RX ORDER — BACLOFEN 10 MG/1
TABLET ORAL
Qty: 15 TABLET | Refills: 2 | Status: SHIPPED | OUTPATIENT
Start: 2022-06-20

## 2022-06-20 RX ORDER — AMITRIPTYLINE HYDROCHLORIDE 50 MG/1
TABLET, FILM COATED ORAL
Qty: 60 TABLET | Refills: 2 | Status: SHIPPED | OUTPATIENT
Start: 2022-06-20

## 2022-06-20 NOTE — LETTER
6/20/2022    Patient: Jasmina Barton   YOB: 1985   Date of Visit: 6/20/2022     Madyson Lopez NP  [address]    Dear Madyson Lopez NP,      Thank you for referring Ms. Jasmina Barton to [facility] for evaluation. My notes for this consultation are attached. If you have questions, please do not hesitate to call me. I look forward to following your patient along with you.       Sincerely,    Ej Duque MD

## 2022-06-20 NOTE — PATIENT INSTRUCTIONS
Patient history reviewed patient examined. In lieu of persistent cramping and discomfort we will increase the amitriptyline to 50 mg and put a lower dose of baclofen in place at night. Warned about potential sedative consequences and need to know if that is a problem. Will refer to orthopedics and see if there is additional instruction or suggestions that go with her current left leg performance. Will pend revisit at least for now.

## 2022-06-20 NOTE — PROGRESS NOTES
Neurology Consult      Subjective:      Karan Khan is a 31 y.o. female who comes in today on follow-up. As a preparation to this particular visit encounter went over the shared information from the hospital in [place] where she had her admission care and subsequent discharge. This took the better part of an hour to overview. From what I can gather the bottom line was she was determined to be encephalopathic, from primary liver failure and pancreatic dysfunction and related issues in the course of investigation. She had other issues as well and as I see the case unfold by history and testing the central and peripheral nervous systems were bystanders in the course of her hospital stay of 40 days and then ultimately onto rehab process. Currently fixed with a left lower leg support bracing and is desirous of finding out is there anything else she can do to enable her self and improve her self-care at this point in time. Does have significant discomfort and cramping affiliated features. Challenged her with amitriptyline 25 mg but she honestly has not noticed any but modest changes if any. Also has significant cramping component for which I will introduce baclofen and she was warned of sedation. Tends to get her worse symptoms at night so we will have the baclofen low-dose at night and will increase the amitriptyline from 25 to 50 mg. Further suggestions may follow. Otherwise I have no other information to lead me to a different set of conclusions as I know it now. Will pend revisit at this point. Current Outpatient Medications   Medication Sig Dispense Refill    amitriptyline (ELAVIL) 50 mg tablet 1 p.o. nightly  Indications: neuropathic pain 60 Tablet 2    baclofen (LIORESAL) 10 mg tablet One half tab p.o. nightly  Indications: muscle spasms caused by a spinal disease 15 Tablet 2    multivitamin (ONE A DAY) tablet Take 1 Tablet by mouth daily.       cyanocobalamin, vitamin B-12, 2,500 mcg tab tablet cyanocobalamin (vitamin B-12) 2,500 mcg tablet   Take 1 tablet every day by oral route.  hydroCHLOROthiazide (HYDRODIURIL) 25 mg tablet Take 25 mg by mouth daily.  rivaroxaban (Xarelto) 20 mg tab tablet Take 1 Tablet by mouth daily. 90 Tablet 1      Allergies   Allergen Reactions    Other Medication Hives     Sulfa drugs     No past medical history on file. No past surgical history on file. Social History     Socioeconomic History    Marital status:      Spouse name: Not on file    Number of children: Not on file    Years of education: Not on file    Highest education level: Not on file   Occupational History    Not on file   Tobacco Use    Smoking status: Never Smoker    Smokeless tobacco: Never Used   Vaping Use    Vaping Use: Never used   Substance and Sexual Activity    Alcohol use: Yes     Alcohol/week: 3.0 standard drinks     Types: 1 Glasses of wine, 1 Cans of beer, 1 Shots of liquor per week     Comment: ocassionally    Drug use: Never    Sexual activity: Not on file   Other Topics Concern    Not on file   Social History Narrative    Not on file     Social Determinants of Health     Financial Resource Strain:     Difficulty of Paying Living Expenses: Not on file   Food Insecurity:     Worried About Running Out of Food in the Last Year: Not on file    Michael of Food in the Last Year: Not on file   Transportation Needs:     Lack of Transportation (Medical): Not on file    Lack of Transportation (Non-Medical):  Not on file   Physical Activity:     Days of Exercise per Week: Not on file    Minutes of Exercise per Session: Not on file   Stress:     Feeling of Stress : Not on file   Social Connections:     Frequency of Communication with Friends and Family: Not on file    Frequency of Social Gatherings with Friends and Family: Not on file    Attends Sabianism Services: Not on file    Active Member of Clubs or Organizations: Not on file    Attends Club or Organization Meetings: Not on file    Marital Status: Not on file   Intimate Partner Violence:     Fear of Current or Ex-Partner: Not on file    Emotionally Abused: Not on file    Physically Abused: Not on file    Sexually Abused: Not on file   Housing Stability:     Unable to Pay for Housing in the Last Year: Not on file    Number of [place] in the Last Year: Not on file    Unstable Housing in the Last Year: Not on file      Family History   Problem Relation Age of Onset    Ovarian Cancer Mother     Hypertension Father       Visit Vitals  /84 (BP 1 Location: Right arm, BP Patient Position: Sitting, BP Cuff Size: Adult)   Pulse 100   Resp 15   Wt 82.1 kg (181 lb)   SpO2 99%   BMI 32.06 kg/m²        Review of Systems:   A comprehensive review of systems was negative except for that written in the HPI. Neuro Exam:     Appearance: The patient is well developed, well nourished, provides a coherent history and is in no acute distress. Mental Status: Oriented to time, place and person. Mood and affect appropriate. Cranial Nerves:   Intact visual fields. Fundi are benign. CARLTON, EOM's full, no nystagmus, no ptosis. Facial sensation is normal. Corneal reflexes are intact. Facial movement is symmetric. Hearing is normal bilaterally. Palate is midline with normal sternocleidomastoid and trapezius muscles are normal. Tongue is midline. Motor:  5/5 strength in upper and lower proximal and distal muscles. Normal bulk and tone on the right side and diminished left leg bulk and tone. No fasciculations. Reflexes:   Deep tendon reflexes 1-2+/4 and symmetrical.   Sensory:    Diminished distally to touch, pinprick and vibration. Gait:   With her left leg/foot support she has a left foot slap step to step. Tremor:   No tremor noted. Cerebellar:  No cerebellar signs present. Neurovascular:  Normal heart sounds and regular rhythm, peripheral pulses intact, and no carotid bruits. Assessment:   Critical illness neuropathy. Will challenge her existing pain and cramping with an increase in the amitriptyline to 50 mg nightly and 5 mg of baclofen at night. Was warned that this could cause sedation and other agents and additives could increase sedation such as alcohol etc. will refer to orthopedics in terms of her left lower leg function and overall performance. Will pend revisit.     Plan:   Revisit pended  Signed by :  Kailyn Claudio MD

## 2022-06-21 DIAGNOSIS — Z86.718 HISTORY OF BLOOD CLOTS: ICD-10-CM

## 2022-06-27 DIAGNOSIS — Z86.718 HISTORY OF BLOOD CLOTS: ICD-10-CM

## 2022-06-27 NOTE — TELEPHONE ENCOUNTER
PCP: Radha Lopez NP    Last appt: 2/8/2022  No future appointments. Requested Prescriptions     Pending Prescriptions Disp Refills    rivaroxaban (Xarelto) 20 mg tab tablet 90 Tablet 1     Sig: Take 1 Tablet by mouth daily.        Prior labs and Blood pressures:  BP Readings from Last 3 Encounters:   06/20/22 126/84   05/09/22 136/88   02/08/22 (!) 129/92     Lab Results   Component Value Date/Time    Sodium 132 (L) 02/08/2022 12:08 PM    Potassium 4.5 02/08/2022 12:08 PM    Chloride 96 (L) 02/08/2022 12:08 PM    CO2 26 02/08/2022 12:08 PM    Anion gap 10 02/08/2022 12:08 PM    Glucose 114 (H) 02/08/2022 12:08 PM    BUN 9 02/08/2022 12:08 PM    Creatinine 0.72 02/08/2022 12:08 PM    BUN/Creatinine ratio 13 02/08/2022 12:08 PM    GFR est AA >60 02/08/2022 12:08 PM    GFR est non-AA >60 02/08/2022 12:08 PM    Calcium 10.0 02/08/2022 12:08 PM     Lab Results   Component Value Date/Time    Hemoglobin A1c 5.7 (H) 02/08/2022 12:08 PM     Lab Results   Component Value Date/Time    Cholesterol, total 163 10/18/2021 09:39 AM    HDL Cholesterol 103 10/18/2021 09:39 AM    LDL, calculated 49.4 10/18/2021 09:39 AM    VLDL, calculated 10.6 10/18/2021 09:39 AM    Triglyceride 53 10/18/2021 09:39 AM    CHOL/HDL Ratio 1.6 10/18/2021 09:39 AM     Lab Results   Component Value Date/Time    Vitamin D 25-Hydroxy 38.6 10/18/2021 09:39 AM       No results found for: TSH, TSH2, TSH3, TSHP, TSHEXT

## 2022-08-08 ENCOUNTER — NURSE TRIAGE (OUTPATIENT)
Dept: OTHER | Facility: CLINIC | Age: 37
End: 2022-08-08

## 2022-08-08 NOTE — TELEPHONE ENCOUNTER
Received call from Jorge at [facility] with [employer] Complaint. Subjective: Caller states Gregg Kebede is moving and thinks her back pain is related, but she has had kidney stones in the past.  She stated she has been drinking a lot of water and taking Azos, has urinary urgency and frequency because of it. She has middle to lower back pain and it radiates to both sides\"     Current Symptoms: urinary frequency, urgency, bilateral flank    Onset: 1 week ago; sudden, rapid, worsening    Associated Symptoms: reduced activity    Pain Severity: 4/10; sharp, dull, and radiating; constant, moderate    Temperature: no n/a    What has been tried: azos, fluids, tylenol, ibuprofen    LMP: NA Pregnant: NA    Recommended disposition: See in Office Today    Care advice provided, patient verbalizes understanding; denies any other questions or concerns; instructed to call back for any new or worsening symptoms. Patient/Caller agrees with recommended disposition; writer provided warm transfer to Mat Soler at [facility] for appointment scheduling    Attention Provider: Thank you for allowing me to participate in the care of your patient. The patient was connected to triage in response to information provided to the [facility]. Please do not respond through this encounter as the response is not directed to a shared pool.     Reason for Disposition   MODERATE pain (e.g., interferes with normal activities or awakens from sleep)    Protocols used: Flank Pain-ADULT-[place]

## 2022-08-08 NOTE — TELEPHONE ENCOUNTER
Received call from [place] at [facility] with [employer] Complaint. Subjective: Caller states \"Back pain for a little over a week, one day of blood in urine\"     Current Symptoms: Urgency, frequency, bilateral back pain (mid-back) that wraps around to the sides. Reports increased fatigue/lethargy. One day of hematuria. Denies dysuria, n/v, fevers    Onset: 1 week ago; gradual    Associated Symptoms: reduced activity    Pain Severity: 5/10; sharp; constant    Temperature: Denies     What has been tried: increased hydration, azo since Friday, tylenol, motrin    LMP:  Mid July  Pregnant: No    Recommended disposition: See in Office Today    Care advice provided, patient verbalizes understanding; denies any other questions or concerns; instructed to call back for any new or worsening symptoms. Patient/Caller agrees with recommended disposition; writer provided warm transfer to Juan Clarke at [facility] for appointment scheduling    Attention Provider: Thank you for allowing me to participate in the care of your patient. The patient was connected to triage in response to information provided to the [facility]. Please do not respond through this encounter as the response is not directed to a shared pool.     Reason for Disposition   MODERATE pain (e.g., interferes with normal activities or awakens from sleep)    Protocols used: Flank Pain-ADULT-[place]

## 2022-08-09 ENCOUNTER — OFFICE VISIT (OUTPATIENT)
Dept: FAMILY MEDICINE CLINIC | Age: 37
End: 2022-08-09
Payer: OTHER GOVERNMENT

## 2022-08-09 VITALS
RESPIRATION RATE: 16 BRPM | BODY MASS INDEX: 32.25 KG/M2 | OXYGEN SATURATION: 97 % | DIASTOLIC BLOOD PRESSURE: 86 MMHG | HEIGHT: 63 IN | WEIGHT: 182 LBS | TEMPERATURE: 98 F | SYSTOLIC BLOOD PRESSURE: 127 MMHG | HEART RATE: 113 BPM

## 2022-08-09 DIAGNOSIS — Z87.442 HISTORY OF KIDNEY STONES: ICD-10-CM

## 2022-08-09 DIAGNOSIS — R80.9 ALBUMINURIA: ICD-10-CM

## 2022-08-09 DIAGNOSIS — D64.9 ANEMIA, UNSPECIFIED TYPE: ICD-10-CM

## 2022-08-09 DIAGNOSIS — Z13.220 SCREENING, LIPID: ICD-10-CM

## 2022-08-09 DIAGNOSIS — R10.9 FLANK PAIN: ICD-10-CM

## 2022-08-09 DIAGNOSIS — N30.01 ACUTE CYSTITIS WITH HEMATURIA: Primary | ICD-10-CM

## 2022-08-09 DIAGNOSIS — R73.03 PREDIABETES: ICD-10-CM

## 2022-08-09 DIAGNOSIS — R31.9 HEMATURIA, UNSPECIFIED TYPE: ICD-10-CM

## 2022-08-09 DIAGNOSIS — I10 PRIMARY HYPERTENSION: ICD-10-CM

## 2022-08-09 DIAGNOSIS — R35.0 URINARY FREQUENCY: ICD-10-CM

## 2022-08-09 LAB
BILIRUB UR QL STRIP: NORMAL
GLUCOSE UR-MCNC: NORMAL MG/DL
KETONES P FAST UR STRIP-MCNC: NORMAL MG/DL
PH UR STRIP: 5 [PH] (ref 4.6–8)
PROT UR QL STRIP: NORMAL
SP GR UR STRIP: 1 (ref 1–1.03)
UA UROBILINOGEN AMB POC: NORMAL (ref 0.2–1)
URINALYSIS CLARITY POC: NORMAL
URINALYSIS COLOR POC: NORMAL
URINE BLOOD POC: NORMAL
URINE LEUKOCYTES POC: NORMAL
URINE NITRITES POC: POSITIVE

## 2022-08-09 PROCEDURE — 99214 OFFICE O/P EST MOD 30 MIN: CPT | Performed by: NURSE PRACTITIONER

## 2022-08-09 PROCEDURE — 81003 URINALYSIS AUTO W/O SCOPE: CPT | Performed by: NURSE PRACTITIONER

## 2022-08-09 RX ORDER — NITROFURANTOIN 25; 75 MG/1; MG/1
100 CAPSULE ORAL 2 TIMES DAILY
Qty: 14 CAPSULE | Refills: 0 | Status: SHIPPED | OUTPATIENT
Start: 2022-08-09 | End: 2022-09-07 | Stop reason: ALTCHOICE

## 2022-08-09 RX ORDER — HYDROCHLOROTHIAZIDE 25 MG/1
25 TABLET ORAL DAILY
Status: CANCELLED | OUTPATIENT
Start: 2022-08-09

## 2022-08-09 RX ORDER — HYDROCHLOROTHIAZIDE 25 MG/1
25 TABLET ORAL DAILY
Qty: 90 TABLET | Refills: 1 | Status: SHIPPED | OUTPATIENT
Start: 2022-08-09

## 2022-08-09 NOTE — PROGRESS NOTES
Identified pt with two pt identifiers(name and ). Reviewed record in preparation for visit and have obtained necessary documentation. Chief Complaint   Patient presents with    Urinary Frequency     For x1 week, experienced blood in urine for x1 day w/ back pain     Medication Refill        Health Maintenance Due   Topic    Hepatitis C Screening     Cervical cancer screen     MenB Meningococcal topic (2 of 4 - Increased Risk Bexsero 2-dose series)    COVID-19 Vaccine (3 - Booster for Moderna series)       Coordination of Care Questionnaire:  :   1) Have you been to an emergency room, urgent care, or hospitalized since your last visit? If yes, where when, and reason for visit? no       2. Have seen or consulted any other health care provider since your last visit? If yes, where when, and reason for visit? NO        Patient is accompanied by self I have received verbal consent from Junior Flores to discuss any/all medical information while they are present in the room.

## 2022-08-09 NOTE — PROGRESS NOTES
Assessment/Plan:     Diagnoses and all orders for this visit:    1. Acute cystitis with hematuria  -     nitrofurantoin, macrocrystal-monohydrate, (MACROBID) 100 mg capsule; Take 1 Capsule by mouth two (2) times a day. -     CULTURE, URINE; Future  - Worsening, hematuria present, concerned this is a kidney stone and I advised patient to go to the ER due to symptoms and exam and her history of kidney stones. She declines at this time. We will treat for acute cystitis with nitrofurantoin, side effects discussed and send urine culture    2. Urinary frequency  -     AMB POC URINALYSIS DIP STICK AUTO W/O MICRO  - Hematuria present    3. Flank pain   -Positive CVA pain today bilateral    4. Hematuria, unspecified type  -     AMB POC URINALYSIS DIP STICK AUTO W/O MICRO    5. Anemia, unspecified type  -     CBC WITH AUTOMATED DIFF; Future  - Presumed stable, labs today    6. Albuminuria  -     MICROALBUMIN, UR, RAND W/ MICROALB/CREAT RATIO; Future  - Presumed stable, labs today    7. Prediabetes  -     HEMOGLOBIN A1C WITH EAG; Future  - Presumed stable, labs today    8. Primary hypertension  -     METABOLIC PANEL, COMPREHENSIVE; Future  -     hydroCHLOROthiazide (HYDRODIURIL) 25 mg tablet; Take 1 Tablet by mouth in the morning.  - At goal today, continue current therapy, refill today and labs today    9. Screening, lipid  -     LIPID PANEL; Future  - Presumed stable labs today          Discussed expected course/resolution/complications of diagnosis in detail with patient. Medication risks/benefits/costs/interactions/alternatives discussed with patient. Pt was given after visit summary which includes diagnoses, current medications & vitals. Pt expressed understanding with the diagnosis and plan        Subjective:      Socorro Alcantara is a 30 y.o. female who presents for had concerns including Urinary Frequency (For x1 week, experienced blood in urine for x1 day w/ back pain ) and Medication Refill.      Here today for urinary frequency for about a week did have some blood when wiping for 1 day. On Xarelto. Taking AZO for symptom relief  Bilateral flank pain, worse on the left, intermittent pain sometimes dull sometimes shooting for about a week   history of kidney stones in the past  Denies fevers    Hypertension  At goal today, needs refill of hydrochlorothiazide  Denies side effects. Denies chest pain, shortness of breath, palpitation, leg swelling    Current Outpatient Medications   Medication Sig Dispense Refill    nitrofurantoin, macrocrystal-monohydrate, (MACROBID) 100 mg capsule Take 1 Capsule by mouth two (2) times a day. 14 Capsule 0    hydroCHLOROthiazide (HYDRODIURIL) 25 mg tablet Take 1 Tablet by mouth in the morning. 90 Tablet 1    rivaroxaban (Xarelto) 20 mg tab tablet Take 1 Tablet by mouth daily. 90 Tablet 1    amitriptyline (ELAVIL) 50 mg tablet 1 p.o. nightly  Indications: neuropathic pain 60 Tablet 2    baclofen (LIORESAL) 10 mg tablet One half tab p.o. nightly  Indications: muscle spasms caused by a spinal disease 15 Tablet 2    multivitamin (ONE A DAY) tablet Take 1 Tablet by mouth daily. Allergies   Allergen Reactions    Other Medication Hives     Sulfa drugs     History reviewed. No pertinent past medical history. History reviewed. No pertinent surgical history. Family History   Problem Relation Age of Onset    Ovarian Cancer Mother     Hypertension Father      Social History     Socioeconomic History    Marital status:      Spouse name: Not on file    Number of children: Not on file    Years of education: Not on file    Highest education level: Not on file   Occupational History    Not on file   Tobacco Use    Smoking status: Never    Smokeless tobacco: Never   Vaping Use    Vaping Use: Never used   Substance and Sexual Activity    Alcohol use:  Yes     Alcohol/week: 3.0 standard drinks     Types: 1 Glasses of wine, 1 Cans of beer, 1 Shots of liquor per week     Comment: ocassionally Drug use: Never    Sexual activity: Not on file   Other Topics Concern    Not on file   Social History Narrative    Not on file     Social Determinants of Health     Financial Resource Strain: Not on file   Food Insecurity: Not on file   Transportation Needs: Not on file   Physical Activity: Not on file   Stress: Not on file   Social Connections: Not on file   Intimate Partner Violence: Not on file   Housing Stability: Not on file       HPI      ROS:   Review of Systems   Constitutional:  Negative for chills, fever and malaise/fatigue. Eyes:  Negative for blurred vision. Respiratory:  Negative for cough and shortness of breath. Cardiovascular:  Negative for chest pain, palpitations and leg swelling. Genitourinary:  Positive for dysuria, flank pain, frequency and hematuria. Neurological:  Negative for dizziness and headaches. Objective:   Visit Vitals  /86 (BP 1 Location: Left upper arm, BP Patient Position: Sitting, BP Cuff Size: Adult)   Pulse (!) 113   Temp 98 °F (36.7 °C) (Temporal)   Resp 16   Ht 5' 3\" (1.6 m)   Wt 182 lb (82.6 kg)   SpO2 97%   BMI 32.24 kg/m²         Vitals and Nurse Documentation reviewed. Physical Exam  Constitutional:       General: She is not in acute distress. Appearance: She is not diaphoretic. HENT:      Head: Normocephalic and atraumatic. Cardiovascular:      Rate and Rhythm: Normal rate and regular rhythm. Heart sounds: Normal heart sounds. No murmur heard. No friction rub. No gallop. Pulmonary:      Effort: Pulmonary effort is normal. No respiratory distress. Breath sounds: Normal breath sounds. No wheezing or rales. Abdominal:      Tenderness: There is right CVA tenderness and left CVA tenderness. Skin:     General: Skin is warm and dry. Coloration: Skin is not pale. Neurological:      Mental Status: She is alert.        Results for orders placed or performed in visit on 08/09/22   CULTURE, URINE    Specimen: Cath Urine Result Value Ref Range    Special Requests: NO SPECIAL REQUESTS      [place] Count [id number]  COLONIES/mL        Culture result: MIXED UROGENITAL LOVELY ISOLATED     METABOLIC PANEL, COMPREHENSIVE   Result Value Ref Range    Sodium 135 (L) 136 - 145 mmol/L    Potassium 3.7 3.5 - 5.1 mmol/L    Chloride 102 97 - 108 mmol/L    CO2 22 21 - 32 mmol/L    Anion gap 11 5 - 15 mmol/L    Glucose 150 (H) 65 - 100 mg/dL    BUN 8 6 - 20 MG/DL    Creatinine 0.74 0.55 - 1.02 MG/DL    BUN/Creatinine ratio 11 (L) 12 - 20      GFR est AA >60 >60 ml/min/1.73m2    GFR est non-AA >60 >60 ml/min/1.73m2    Calcium 9.0 8.5 - 10.1 MG/DL    Bilirubin, total 0.6 0.2 - 1.0 MG/DL    ALT (SGPT) 98 (H) 12 - 78 U/L    AST (SGOT) 257 (H) 15 - 37 U/L    Alk. phosphatase 163 (H) 45 - 117 U/L    Protein, total 7.9 6.4 - 8.2 g/dL    Albumin 3.6 3.5 - 5.0 g/dL    Globulin 4.3 (H) 2.0 - 4.0 g/dL    A-G Ratio 0.8 (L) 1.1 - 2.2     LIPID PANEL   Result Value Ref Range    Cholesterol, total 219 (H) <200 MG/DL    Triglyceride 93 <150 MG/DL    HDL Cholesterol 86 MG/DL    LDL, calculated 114.4 (H) 0 - 100 MG/DL    VLDL, calculated 18.6 MG/DL    CHOL/HDL Ratio 2.5 0.0 - 5.0     CBC WITH AUTOMATED DIFF   Result Value Ref Range    WBC 12.8 (H) 3.6 - 11.0 K/uL    RBC 4.58 3.80 - 5.20 M/uL    HGB 11.2 (L) 11.5 - 16.0 g/dL    HCT 37.9 35.0 - 47.0 %    MCV 82.8 80.0 - 99.0 FL    MCH 24.5 (L) 26.0 - 34.0 PG    MCHC 29.6 (L) 30.0 - 36.5 g/dL    RDW 23.7 (H) 11.5 - 14.5 %    PLATELET 243 416 - 277 K/uL    MPV 10.5 8.9 - 12.9 FL    NRBC 0.0 0  WBC    ABSOLUTE NRBC 0.00 0.00 - 0.01 K/uL    NEUTROPHILS 67 32 - 75 %    LYMPHOCYTES 20 12 - 49 %    MONOCYTES 11 5 - 13 %    EOSINOPHILS 0 0 - 7 %    BASOPHILS 2 (H) 0 - 1 %    IMMATURE GRANULOCYTES 0 0.0 - 0.5 %    ABS. NEUTROPHILS 8.5 (H) 1.8 - 8.0 K/UL    ABS. LYMPHOCYTES 2.6 0.8 - 3.5 K/UL    ABS. MONOCYTES 1.4 (H) 0.0 - 1.0 K/UL    ABS. EOSINOPHILS 0.0 0.0 - 0.4 K/UL    ABS. BASOPHILS 0.3 (H) 0.0 - 0.1 K/UL    ABS. IMM. GRANS. 0.0 0.00 - 0.04 K/UL    DF SMEAR SCANNED      RBC COMMENTS ANISOCYTOSIS  3+        RBC COMMENTS KVNG CELLS  PRESENT       HEMOGLOBIN A1C WITH EAG   Result Value Ref Range    Hemoglobin A1c 5.6 4.0 - 5.6 %    Est. average glucose 114 mg/dL   AMB POC URINALYSIS DIP STICK AUTO W/O MICRO   Result Value Ref Range    Color (UA POC) Red     Clarity (UA POC) Slightly Cloudy     Glucose (UA POC) 2+ Negative    Bilirubin (UA POC) 3+ Negative    Ketones (UA POC) Trace Negative    Specific gravity (UA POC) 1.005 1.001 - 1.035    Blood (UA POC) 1+ Negative    pH (UA POC) 5.0 4.6 - 8.0    Protein (UA POC) 3+ Negative    Urobilinogen (UA POC) >=8.0 mg/dL 0.2 - 1    Nitrites (UA POC) Positive Negative    Leukocyte esterase (UA POC) 4+ Negative

## 2022-08-10 LAB
ALBUMIN SERPL-MCNC: 3.6 G/DL (ref 3.5–5)
ALBUMIN/GLOB SERPL: 0.8 {RATIO} (ref 1.1–2.2)
ALP SERPL-CCNC: 163 U/L (ref 45–117)
ALT SERPL-CCNC: 98 U/L (ref 12–78)
ANION GAP SERPL CALC-SCNC: 11 MMOL/L (ref 5–15)
AST SERPL-CCNC: 257 U/L (ref 15–37)
BASOPHILS # BLD: 0.3 K/UL (ref 0–0.1)
BASOPHILS NFR BLD: 2 % (ref 0–1)
BILIRUB SERPL-MCNC: 0.6 MG/DL (ref 0.2–1)
BUN SERPL-MCNC: 8 MG/DL (ref 6–20)
BUN/CREAT SERPL: 11 (ref 12–20)
CALCIUM SERPL-MCNC: 9 MG/DL (ref 8.5–10.1)
CHLORIDE SERPL-SCNC: 102 MMOL/L (ref 97–108)
CHOLEST SERPL-MCNC: 219 MG/DL
CO2 SERPL-SCNC: 22 MMOL/L (ref 21–32)
CREAT SERPL-MCNC: 0.74 MG/DL (ref 0.55–1.02)
DIFFERENTIAL METHOD BLD: ABNORMAL
EOSINOPHIL # BLD: 0 K/UL (ref 0–0.4)
EOSINOPHIL NFR BLD: 0 % (ref 0–7)
ERYTHROCYTE [DISTWIDTH] IN BLOOD BY AUTOMATED COUNT: 23.7 % (ref 11.5–14.5)
EST. AVERAGE GLUCOSE BLD GHB EST-MCNC: 114 MG/DL
GLOBULIN SER CALC-MCNC: 4.3 G/DL (ref 2–4)
GLUCOSE SERPL-MCNC: 150 MG/DL (ref 65–100)
HBA1C MFR BLD: 5.6 % (ref 4–5.6)
HCT VFR BLD AUTO: 37.9 % (ref 35–47)
HDLC SERPL-MCNC: 86 MG/DL
HDLC SERPL: 2.5 {RATIO} (ref 0–5)
HGB BLD-MCNC: 11.2 G/DL (ref 11.5–16)
IMM GRANULOCYTES # BLD AUTO: 0 K/UL (ref 0–0.04)
IMM GRANULOCYTES NFR BLD AUTO: 0 % (ref 0–0.5)
LDLC SERPL CALC-MCNC: 114.4 MG/DL (ref 0–100)
LYMPHOCYTES # BLD: 2.6 K/UL (ref 0.8–3.5)
LYMPHOCYTES NFR BLD: 20 % (ref 12–49)
MCH RBC QN AUTO: 24.5 PG (ref 26–34)
MCHC RBC AUTO-ENTMCNC: 29.6 G/DL (ref 30–36.5)
MCV RBC AUTO: 82.8 FL (ref 80–99)
MONOCYTES # BLD: 1.4 K/UL (ref 0–1)
MONOCYTES NFR BLD: 11 % (ref 5–13)
NEUTS SEG # BLD: 8.5 K/UL (ref 1.8–8)
NEUTS SEG NFR BLD: 67 % (ref 32–75)
NRBC # BLD: 0 K/UL (ref 0–0.01)
NRBC BLD-RTO: 0 PER 100 WBC
PLATELET # BLD AUTO: 371 K/UL (ref 150–400)
PMV BLD AUTO: 10.5 FL (ref 8.9–12.9)
POTASSIUM SERPL-SCNC: 3.7 MMOL/L (ref 3.5–5.1)
PROT SERPL-MCNC: 7.9 G/DL (ref 6.4–8.2)
RBC # BLD AUTO: 4.58 M/UL (ref 3.8–5.2)
RBC MORPH BLD: ABNORMAL
RBC MORPH BLD: ABNORMAL
SODIUM SERPL-SCNC: 135 MMOL/L (ref 136–145)
TRIGL SERPL-MCNC: 93 MG/DL (ref ?–150)
VLDLC SERPL CALC-MCNC: 18.6 MG/DL
WBC # BLD AUTO: 12.8 K/UL (ref 3.6–11)

## 2022-08-11 ENCOUNTER — TELEPHONE (OUTPATIENT)
Dept: FAMILY MEDICINE CLINIC | Age: 37
End: 2022-08-11

## 2022-08-11 LAB
BACTERIA SPEC CULT: NORMAL
CC UR VC: NORMAL
SERVICE CMNT-IMP: NORMAL

## 2022-08-11 NOTE — PROGRESS NOTES
Her urine culture was normal, I have concerns this is a kidney stone and if she is still having pain she needs to go to the ER

## 2022-08-11 NOTE — TELEPHONE ENCOUNTER
----- Message from Maritza Prater NP sent at 8/11/2022 10:30 AM EDT -----  Her urine culture was normal, I have concerns this is a kidney stone and if she is still having pain she needs to go to the ER

## 2022-08-13 NOTE — TELEPHONE ENCOUNTER
Called patient to make sure she had received a message that her urine culture was clear and that if she was having ongoing symptoms she need to go to the ER. Patient stated that she was no longer having any urinary tract or flank pain and will stop the antibiotic.   She will follow-up if symptoms return

## 2022-09-07 ENCOUNTER — OFFICE VISIT (OUTPATIENT)
Dept: FAMILY MEDICINE CLINIC | Age: 37
End: 2022-09-07
Payer: OTHER GOVERNMENT

## 2022-09-07 VITALS
TEMPERATURE: 97.9 F | BODY MASS INDEX: 32.5 KG/M2 | WEIGHT: 183.4 LBS | HEART RATE: 98 BPM | HEIGHT: 63 IN | SYSTOLIC BLOOD PRESSURE: 118 MMHG | RESPIRATION RATE: 17 BRPM | DIASTOLIC BLOOD PRESSURE: 79 MMHG

## 2022-09-07 DIAGNOSIS — M21.372 FOOT DROP, LEFT: ICD-10-CM

## 2022-09-07 DIAGNOSIS — S93.402D SPRAIN OF LEFT ANKLE, UNSPECIFIED LIGAMENT, SUBSEQUENT ENCOUNTER: Primary | ICD-10-CM

## 2022-09-07 PROCEDURE — 99213 OFFICE O/P EST LOW 20 MIN: CPT | Performed by: NURSE PRACTITIONER

## 2022-09-07 NOTE — PROGRESS NOTES
Assessment/Plan:     Diagnoses and all orders for this visit:    1. Sprain of left ankle, unspecified ligament, subsequent encounter  -     REFERRAL TO ORTHOPEDICS  - Stable at this time, ref to orthopedics for further evaluation and treatment    2. Foot drop, left  -     REFERRAL TO ORTHOPEDICS  - As above          Discussed expected course/resolution/complications of diagnosis in detail with patient. Medication risks/benefits/costs/interactions/alternatives discussed with patient. Pt was given after visit summary which includes diagnoses, current medications & vitals. Pt expressed understanding with the diagnosis and plan        Subjective:      Jesus Johns is a 30 y.o. female who presents for had concerns including Follow-up (ER follow up ). Here today for ER follow up. Went to [address] [place] [address]  Last week walking down the stairs with sandals, right foot, slid forward and left slid behind her and fell down last 3 steps. Hit head on the door. ER did xray of left ankle and head CT. Imaging was all normal.  Referred to orthopedic for further evaluation and treatment. Was given tramadol for pain but states that made her sleepy   Was referred to Dr. Francisco Javier Tucker    Current Outpatient Medications   Medication Sig Dispense Refill    hydroCHLOROthiazide (HYDRODIURIL) 25 mg tablet Take 1 Tablet by mouth in the morning. 90 Tablet 1    rivaroxaban (Xarelto) 20 mg tab tablet Take 1 Tablet by mouth daily. 90 Tablet 1    amitriptyline (ELAVIL) 50 mg tablet 1 p.o. nightly  Indications: neuropathic pain 60 Tablet 2    baclofen (LIORESAL) 10 mg tablet One half tab p.o. nightly  Indications: muscle spasms caused by a spinal disease 15 Tablet 2    multivitamin (ONE A DAY) tablet Take 1 Tablet by mouth daily. Allergies   Allergen Reactions    Other Medication Hives     Sulfa drugs     No past medical history on file. No past surgical history on file.   Family History   Problem Relation Age of Onset    Ovarian Cancer Mother Hypertension Father      Social History     Socioeconomic History    Marital status:      Spouse name: Not on file    Number of children: Not on file    Years of education: Not on file    Highest education level: Not on file   Occupational History    Not on file   Tobacco Use    Smoking status: Never    Smokeless tobacco: Never   Vaping Use    Vaping Use: Never used   Substance and Sexual Activity    Alcohol use: Yes     Alcohol/week: 3.0 standard drinks     Types: 1 Glasses of wine, 1 Cans of beer, 1 Shots of liquor per week     Comment: ocassionally    Drug use: Never    Sexual activity: Not on file   Other Topics Concern    Not on file   Social History Narrative    Not on file     Social Determinants of Health     Financial Resource Strain: Not on file   Food Insecurity: Not on file   Transportation Needs: Not on file   Physical Activity: Not on file   Stress: Not on file   Social Connections: Not on file   Intimate Partner Violence: Not on file   Housing Stability: Not on file       HPI      ROS:   Review of Systems   Constitutional:  Negative for chills, fever and malaise/fatigue. Eyes:  Negative for blurred vision. Respiratory:  Negative for cough and shortness of breath. Cardiovascular:  Negative for chest pain, palpitations and leg swelling. Musculoskeletal:  Positive for joint pain and myalgias. Neurological:  Negative for dizziness and headaches. Objective:   Visit Vitals  /79 (BP 1 Location: Left upper arm, BP Patient Position: Sitting, BP Cuff Size: Small adult)   Pulse 98   Temp 97.9 °F (36.6 °C) (Temporal)   Resp 17   Ht 5' 3\" (1.6 m)   Wt 183 lb 6.4 oz (83.2 kg)   BMI 32.49 kg/m²         Vitals and Nurse Documentation reviewed. Physical Exam  Constitutional:       General: She is not in acute distress. Appearance: She is not diaphoretic. HENT:      Head: Normocephalic and atraumatic. Cardiovascular:      Rate and Rhythm: Normal rate and regular rhythm. Heart sounds: Normal heart sounds. No murmur heard. No friction rub. No gallop. Pulmonary:      Effort: Pulmonary effort is normal. No respiratory distress. Breath sounds: Normal breath sounds. No wheezing or rales. Musculoskeletal:      Left ankle: Swelling present. Tenderness present. Decreased range of motion. Left foot: Swelling present. Skin:     General: Skin is warm and dry. Coloration: Skin is not pale. Neurological:      Mental Status: She is alert. Results for orders placed or performed in visit on 08/09/22   CULTURE, URINE    Specimen: Cath Urine   Result Value Ref Range    Special Requests: NO SPECIAL REQUESTS      [place] Count [id number]  COLONIES/mL        Culture result: MIXED UROGENITAL LOVELY ISOLATED     METABOLIC PANEL, COMPREHENSIVE   Result Value Ref Range    Sodium 135 (L) 136 - 145 mmol/L    Potassium 3.7 3.5 - 5.1 mmol/L    Chloride 102 97 - 108 mmol/L    CO2 22 21 - 32 mmol/L    Anion gap 11 5 - 15 mmol/L    Glucose 150 (H) 65 - 100 mg/dL    BUN 8 6 - 20 MG/DL    Creatinine 0.74 0.55 - 1.02 MG/DL    BUN/Creatinine ratio 11 (L) 12 - 20      GFR est AA >60 >60 ml/min/1.73m2    GFR est non-AA >60 >60 ml/min/1.73m2    Calcium 9.0 8.5 - 10.1 MG/DL    Bilirubin, total 0.6 0.2 - 1.0 MG/DL    ALT (SGPT) 98 (H) 12 - 78 U/L    AST (SGOT) 257 (H) 15 - 37 U/L    Alk.  phosphatase 163 (H) 45 - 117 U/L    Protein, total 7.9 6.4 - 8.2 g/dL    Albumin 3.6 3.5 - 5.0 g/dL    Globulin 4.3 (H) 2.0 - 4.0 g/dL    A-G Ratio 0.8 (L) 1.1 - 2.2     LIPID PANEL   Result Value Ref Range    Cholesterol, total 219 (H) <200 MG/DL    Triglyceride 93 <150 MG/DL    HDL Cholesterol 86 MG/DL    LDL, calculated 114.4 (H) 0 - 100 MG/DL    VLDL, calculated 18.6 MG/DL    CHOL/HDL Ratio 2.5 0.0 - 5.0     CBC WITH AUTOMATED DIFF   Result Value Ref Range    WBC 12.8 (H) 3.6 - 11.0 K/uL    RBC 4.58 3.80 - 5.20 M/uL    HGB 11.2 (L) 11.5 - 16.0 g/dL    HCT 37.9 35.0 - 47.0 %    MCV 82.8 80.0 - 99.0 FL    MCH 24.5 (L) 26.0 - 34.0 PG    MCHC 29.6 (L) 30.0 - 36.5 g/dL    RDW 23.7 (H) 11.5 - 14.5 %    PLATELET 716 597 - 381 K/uL    MPV 10.5 8.9 - 12.9 FL    NRBC 0.0 0  WBC    ABSOLUTE NRBC 0.00 0.00 - 0.01 K/uL    NEUTROPHILS 67 32 - 75 %    LYMPHOCYTES 20 12 - 49 %    MONOCYTES 11 5 - 13 %    EOSINOPHILS 0 0 - 7 %    BASOPHILS 2 (H) 0 - 1 %    IMMATURE GRANULOCYTES 0 0.0 - 0.5 %    ABS. NEUTROPHILS 8.5 (H) 1.8 - 8.0 K/UL    ABS. LYMPHOCYTES 2.6 0.8 - 3.5 K/UL    ABS. MONOCYTES 1.4 (H) 0.0 - 1.0 K/UL    ABS. EOSINOPHILS 0.0 0.0 - 0.4 K/UL    ABS. BASOPHILS 0.3 (H) 0.0 - 0.1 K/UL    ABS. IMM.  GRANS. 0.0 0.00 - 0.04 K/UL    DF SMEAR SCANNED      RBC COMMENTS ANISOCYTOSIS  3+        RBC COMMENTS KVNG CELLS  PRESENT       HEMOGLOBIN A1C WITH EAG   Result Value Ref Range    Hemoglobin A1c 5.6 4.0 - 5.6 %    Est. average glucose 114 mg/dL   AMB POC URINALYSIS DIP STICK AUTO W/O MICRO   Result Value Ref Range    Color (UA POC) Red     Clarity (UA POC) Slightly Cloudy     Glucose (UA POC) 2+ Negative    Bilirubin (UA POC) 3+ Negative    Ketones (UA POC) Trace Negative    Specific gravity (UA POC) 1.005 1.001 - 1.035    Blood (UA POC) 1+ Negative    pH (UA POC) 5.0 4.6 - 8.0    Protein (UA POC) 3+ Negative    Urobilinogen (UA POC) >=8.0 mg/dL 0.2 - 1    Nitrites (UA POC) Positive Negative    Leukocyte esterase (UA POC) 4+ Negative

## 2022-09-07 NOTE — PROGRESS NOTES
Chief Complaint   Patient presents with    Follow-up     ER follow up      1. Have you been to the ER, urgent care clinic since your last visit? [facility]  Hospitalized since your last visit? No     2. Have you seen or consulted any other health care providers outside of the [address] [place] [address] since your last visit? Include any pap smears or colon screening.  No

## 2022-10-28 ENCOUNTER — OFFICE VISIT (OUTPATIENT)
Dept: FAMILY MEDICINE CLINIC | Age: 37
End: 2022-10-28
Payer: OTHER GOVERNMENT

## 2022-10-28 VITALS
BODY MASS INDEX: 30.19 KG/M2 | DIASTOLIC BLOOD PRESSURE: 79 MMHG | TEMPERATURE: 97.8 F | WEIGHT: 170.4 LBS | SYSTOLIC BLOOD PRESSURE: 112 MMHG | HEIGHT: 63 IN | OXYGEN SATURATION: 100 % | HEART RATE: 84 BPM | RESPIRATION RATE: 16 BRPM

## 2022-10-28 DIAGNOSIS — Z23 ENCOUNTER FOR IMMUNIZATION: ICD-10-CM

## 2022-10-28 DIAGNOSIS — M21.372 FOOT DROP, LEFT: ICD-10-CM

## 2022-10-28 DIAGNOSIS — K85.90 ACUTE PANCREATITIS, UNSPECIFIED COMPLICATION STATUS, UNSPECIFIED PANCREATITIS TYPE: ICD-10-CM

## 2022-10-28 DIAGNOSIS — Z09 HOSPITAL DISCHARGE FOLLOW-UP: Primary | ICD-10-CM

## 2022-10-28 PROCEDURE — 90471 IMMUNIZATION ADMIN: CPT | Performed by: NURSE PRACTITIONER

## 2022-10-28 PROCEDURE — 99214 OFFICE O/P EST MOD 30 MIN: CPT | Performed by: NURSE PRACTITIONER

## 2022-10-28 PROCEDURE — 3074F SYST BP LT 130 MM HG: CPT | Performed by: NURSE PRACTITIONER

## 2022-10-28 PROCEDURE — 3078F DIAST BP <80 MM HG: CPT | Performed by: NURSE PRACTITIONER

## 2022-10-28 PROCEDURE — 90686 IIV4 VACC NO PRSV 0.5 ML IM: CPT | Performed by: NURSE PRACTITIONER

## 2022-10-28 PROCEDURE — 1111F DSCHRG MED/CURRENT MED MERGE: CPT | Performed by: NURSE PRACTITIONER

## 2022-10-28 RX ORDER — HYDROMORPHONE HYDROCHLORIDE 4 MG/1
TABLET ORAL
COMMUNITY
Start: 2022-10-23 | End: 2022-11-11

## 2022-10-28 RX ORDER — ONDANSETRON 4 MG/1
TABLET, ORALLY DISINTEGRATING ORAL
COMMUNITY
Start: 2022-08-31

## 2022-10-28 NOTE — PROGRESS NOTES
Transitional Care Management Progress Note    Patient: Elizabeth Diaz  : 1985  PCP: Malik Marquez NP    Date of admission: 10/20  Date of discharge: 10/23    Patient was contacted by Transitional Care Management services within two days after her discharge: No. This encounter and supporting documentation was reviewed if available. Medication reconciliation was performed today (10/28/2022). Assessment/Plan:   Diagnoses and all orders for this visit:    1. Hospital discharge follow-up  -     IA DISCHARGE MEDS RECONCILED W/ CURRENT OUTPATIENT MED LIST  - Stable, advised to keep all follow-up appointments. Reasons to seek urgent care and return to ER discussed. 2. Encounter for immunization  -     INFLUENZA, FLUARIX, FLULAVAL, FLUZONE (AGE 6 MO+), AFLURIA(AGE 3Y+) IM, PF, 0.5 ML    3. Acute pancreatitis, unspecified complication status, unspecified pancreatitis type  -     CBC WITH AUTOMATED DIFF; Future  -     LIPASE; Future  - Presumed improving, labs today. Advised against any alcohol use    4. Foot drop, left  -     REFERRAL TO NEUROLOGY  - Chronic and ongoing, needs new referral to neurologist       Subjective:   Elizabeth Diaz is a 30 y.o. female presenting today for follow-up after being discharged from [facility].  The discharge summary was reviewed or requested. The main problem requiring admission was Pancreatitis. Complications during admission: none    States was giving pain medication, still   Seeing 22        Interval history/Current status: stable    Admitting symptoms have: improved      Medications marked \"taking\" at this time:  Home Medications    Medication Sig Start Date End Date Taking? Authorizing Provider   ondansetron (ZOFRAN ODT) 4 mg disintegrating tablet  22  Yes Provider, Historical   multivitamin (ONE A DAY) tablet Take 1 Tablet by mouth daily. Yes Provider, Historical   rivaroxaban (Xarelto) 20 mg tab tablet Take  by mouth daily.     Provider, Historical   hydroCHLOROthiazide (HYDRODIURIL) 25 mg tablet Take 1 Tablet by mouth daily. 11/11/22   Samina Junior MD   senna-docusate (Senna with Docusate Sodium) 8.6-50 mg per tablet Take 1 Tablet by mouth daily. 11/11/22   Henrry Bonilla MD   ondansetron (ZOFRAN ODT) 4 mg disintegrating tablet Take 1 Tablet by mouth every eight (8) hours as needed for Nausea or Vomiting. 11/11/22   Henrry Bonilla MD   tamsulosin (Flomax) 0.4 mg capsule Take 1 Capsule by mouth daily. Indications: stones in the urinary tract 11/11/22   Henrry Bonilla MD   potassium chloride (K-DUR, KLOR-CON M20) 20 mEq tablet Take 1 Tablet by mouth daily. 11/11/22   Henrry Bonilla MD        Review of Systems:  Review of Systems   Constitutional:  Negative for chills, fever and malaise/fatigue. Eyes:  Negative for blurred vision. Respiratory:  Negative for cough and shortness of breath. Cardiovascular:  Negative for chest pain, palpitations and leg swelling. Gastrointestinal:  Positive for abdominal pain. Neurological:  Negative for dizziness and headaches. 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 Appearance: She is not diaphoretic. HENT:      Head: Normocephalic and atraumatic. Cardiovascular:      Rate and Rhythm: Normal rate and regular rhythm. Heart sounds: Normal heart sounds. No murmur heard. No friction rub.  No gallop. Pulmonary:      Effort: Pulmonary effort is normal. No respiratory distress. Breath sounds: Normal breath sounds. No wheezing or rales. Abdominal:      Palpations: There is no hepatomegaly or splenomegaly. Tenderness: There is no abdominal tenderness. Skin:     General: Skin is warm and dry. Coloration: Skin is not pale. Neurological:      Mental Status: She is alert. We discussed the expected course, resolution and complications of the diagnosis(es) in detail. Medication risks, benefits, costs, interactions, and alternatives were discussed as indicated. I advised her to contact the office if her condition worsens, changes or fails to improve as anticipated. She expressed understanding with the diagnosis(es) and plan.      Terence Renee NP

## 2022-10-28 NOTE — PROGRESS NOTES
Identified pt with two pt identifiers(name and ). Reviewed record in preparation for visit and have obtained necessary documentation. Chief Complaint   Patient presents with    Hospital Follow Up     On 10/20/22 at [facility]- for severe back/stomach pain         Health Maintenance Due   Topic    Hepatitis C Screening     Cervical cancer screen     MenB Meningococcal topic (2 of 4 - Increased Risk Bexsero 2-dose series)    COVID-19 Vaccine (3 - Booster for Moderna series)    Flu Vaccine (1)       Coordination of Care Questionnaire:  :   1) Have you been to an emergency room, urgent care, or hospitalized since your last visit? If yes, where when, and reason for visit? Yes, [facility] for severe back/abdomen pain      2. Have seen or consulted any other health care provider since your last visit? If yes, where when, and reason for visit? NO        Patient is accompanied by self I have received verbal consent from Carol Alba to discuss any/all medical information while they are present in the room.

## 2022-10-29 LAB
BASOPHILS # BLD: 0.1 K/UL (ref 0–0.1)
BASOPHILS NFR BLD: 1 % (ref 0–1)
DIFFERENTIAL METHOD BLD: ABNORMAL
EOSINOPHIL # BLD: 0 K/UL (ref 0–0.4)
EOSINOPHIL NFR BLD: 0 % (ref 0–7)
ERYTHROCYTE [DISTWIDTH] IN BLOOD BY AUTOMATED COUNT: 19.3 % (ref 11.5–14.5)
HCT VFR BLD AUTO: 35.5 % (ref 35–47)
HGB BLD-MCNC: 11.2 G/DL (ref 11.5–16)
IMM GRANULOCYTES # BLD AUTO: 0.1 K/UL (ref 0–0.04)
IMM GRANULOCYTES NFR BLD AUTO: 1 % (ref 0–0.5)
LIPASE SERPL-CCNC: 113 U/L (ref 73–393)
LYMPHOCYTES # BLD: 0.8 K/UL (ref 0.8–3.5)
LYMPHOCYTES NFR BLD: 8 % (ref 12–49)
MCH RBC QN AUTO: 23.9 PG (ref 26–34)
MCHC RBC AUTO-ENTMCNC: 31.5 G/DL (ref 30–36.5)
MCV RBC AUTO: 75.9 FL (ref 80–99)
MONOCYTES # BLD: 2 K/UL (ref 0–1)
MONOCYTES NFR BLD: 20 % (ref 5–13)
NEUTS SEG # BLD: 6.8 K/UL (ref 1.8–8)
NEUTS SEG NFR BLD: 70 % (ref 32–75)
NRBC # BLD: 0 K/UL (ref 0–0.01)
NRBC BLD-RTO: 0 PER 100 WBC
PLATELET # BLD AUTO: 499 K/UL (ref 150–400)
PLATELET COMMENTS,PCOM: ABNORMAL
PMV BLD AUTO: 11.8 FL (ref 8.9–12.9)
RBC # BLD AUTO: 4.68 M/UL (ref 3.8–5.2)
RBC MORPH BLD: ABNORMAL
WBC # BLD AUTO: 9.8 K/UL (ref 3.6–11)

## 2022-11-09 ENCOUNTER — HOSPITAL ENCOUNTER (INPATIENT)
Age: 37
LOS: 2 days | Discharge: HOME | DRG: 439 | End: 2022-11-11
Attending: EMERGENCY MEDICINE | Admitting: FAMILY MEDICINE
Payer: OTHER GOVERNMENT

## 2022-11-09 ENCOUNTER — APPOINTMENT (OUTPATIENT)
Dept: CT IMAGING | Age: 37
DRG: 439 | End: 2022-11-09
Attending: NURSE PRACTITIONER
Payer: OTHER GOVERNMENT

## 2022-11-09 DIAGNOSIS — Z79.01 CHRONIC ANTICOAGULATION: ICD-10-CM

## 2022-11-09 DIAGNOSIS — K85.00 IDIOPATHIC ACUTE PANCREATITIS WITHOUT INFECTION OR NECROSIS: Primary | ICD-10-CM

## 2022-11-09 DIAGNOSIS — E87.1 HYPONATREMIA: ICD-10-CM

## 2022-11-09 DIAGNOSIS — E66.9 CLASS 1 OBESITY WITH BODY MASS INDEX (BMI) OF 30.0 TO 30.9 IN ADULT, UNSPECIFIED OBESITY TYPE, UNSPECIFIED WHETHER SERIOUS COMORBIDITY PRESENT: ICD-10-CM

## 2022-11-09 DIAGNOSIS — Z78.9 ALCOHOL USE: ICD-10-CM

## 2022-11-09 DIAGNOSIS — E87.6 HYPOKALEMIA: ICD-10-CM

## 2022-11-09 DIAGNOSIS — I10 PRIMARY HYPERTENSION: ICD-10-CM

## 2022-11-09 DIAGNOSIS — K76.0 HEPATIC STEATOSIS: ICD-10-CM

## 2022-11-09 DIAGNOSIS — N20.0 NEPHROLITHIASIS: ICD-10-CM

## 2022-11-09 PROBLEM — K85.90 PANCREATITIS: Status: ACTIVE | Noted: 2022-11-09

## 2022-11-09 LAB
ALBUMIN SERPL-MCNC: 3.3 G/DL (ref 3.5–5)
ALBUMIN/GLOB SERPL: 0.7 {RATIO} (ref 1.1–2.2)
ALP SERPL-CCNC: 138 U/L (ref 45–117)
ALT SERPL-CCNC: 30 U/L (ref 12–78)
AMORPH CRY URNS QL MICRO: ABNORMAL
ANION GAP SERPL CALC-SCNC: 8 MMOL/L (ref 5–15)
APPEARANCE UR: ABNORMAL
AST SERPL-CCNC: 28 U/L (ref 15–37)
BACTERIA URNS QL MICRO: ABNORMAL /HPF
BASOPHILS # BLD: 0.2 K/UL (ref 0–0.1)
BASOPHILS NFR BLD: 1 % (ref 0–1)
BILIRUB SERPL-MCNC: 0.9 MG/DL (ref 0.2–1)
BILIRUB UR QL CFM: NEGATIVE
BUN SERPL-MCNC: 8 MG/DL (ref 6–20)
BUN/CREAT SERPL: 12 (ref 12–20)
CALCIUM SERPL-MCNC: 10.5 MG/DL (ref 8.5–10.1)
CAOX CRY URNS QL MICRO: ABNORMAL
CHLORIDE SERPL-SCNC: 103 MMOL/L (ref 97–108)
CO2 SERPL-SCNC: 24 MMOL/L (ref 21–32)
COLOR UR: ABNORMAL
COMMENT, HOLDF: NORMAL
CREAT SERPL-MCNC: 0.69 MG/DL (ref 0.55–1.02)
DIFFERENTIAL METHOD BLD: ABNORMAL
EOSINOPHIL # BLD: 0 K/UL (ref 0–0.4)
EOSINOPHIL NFR BLD: 0 % (ref 0–7)
EPITH CASTS URNS QL MICRO: ABNORMAL /LPF
ERYTHROCYTE [DISTWIDTH] IN BLOOD BY AUTOMATED COUNT: 20.1 % (ref 11.5–14.5)
GLOBULIN SER CALC-MCNC: 4.9 G/DL (ref 2–4)
GLUCOSE SERPL-MCNC: 123 MG/DL (ref 65–100)
GLUCOSE UR STRIP.AUTO-MCNC: NEGATIVE MG/DL
HCG UR QL: NEGATIVE
HCT VFR BLD AUTO: 33.7 % (ref 35–47)
HGB BLD-MCNC: 10.6 G/DL (ref 11.5–16)
HGB UR QL STRIP: ABNORMAL
IMM GRANULOCYTES # BLD AUTO: 0 K/UL (ref 0–0.04)
IMM GRANULOCYTES NFR BLD AUTO: 0 % (ref 0–0.5)
KETONES UR QL STRIP.AUTO: 15 MG/DL
LEUKOCYTE ESTERASE UR QL STRIP.AUTO: ABNORMAL
LIPASE SERPL-CCNC: 615 U/L (ref 73–393)
LYMPHOCYTES # BLD: 2.4 K/UL (ref 0.8–3.5)
LYMPHOCYTES NFR BLD: 15 % (ref 12–49)
MCH RBC QN AUTO: 24.1 PG (ref 26–34)
MCHC RBC AUTO-ENTMCNC: 31.5 G/DL (ref 30–36.5)
MCV RBC AUTO: 76.6 FL (ref 80–99)
MONOCYTES # BLD: 1.6 K/UL (ref 0–1)
MONOCYTES NFR BLD: 10 % (ref 5–13)
NEUTS SEG # BLD: 11.6 K/UL (ref 1.8–8)
NEUTS SEG NFR BLD: 74 % (ref 32–75)
NITRITE UR QL STRIP.AUTO: POSITIVE
NRBC # BLD: 0 K/UL (ref 0–0.01)
NRBC BLD-RTO: 0 PER 100 WBC
PH UR STRIP: 6 [PH] (ref 5–8)
PLATELET # BLD AUTO: 636 K/UL (ref 150–400)
PMV BLD AUTO: 9 FL (ref 8.9–12.9)
POTASSIUM SERPL-SCNC: 3.2 MMOL/L (ref 3.5–5.1)
PROT SERPL-MCNC: 8.2 G/DL (ref 6.4–8.2)
PROT UR STRIP-MCNC: 100 MG/DL
RBC # BLD AUTO: 4.4 M/UL (ref 3.8–5.2)
RBC #/AREA URNS HPF: ABNORMAL /HPF (ref 0–5)
RBC MORPH BLD: ABNORMAL
SAMPLES BEING HELD,HOLD: NORMAL
SODIUM SERPL-SCNC: 135 MMOL/L (ref 136–145)
SP GR UR REFRACTOMETRY: 1.03 (ref 1–1.03)
UR CULT HOLD, URHOLD: NORMAL
UROBILINOGEN UR QL STRIP.AUTO: 1 EU/DL (ref 0.2–1)
WBC # BLD AUTO: 15.8 K/UL (ref 3.6–11)
WBC URNS QL MICRO: ABNORMAL /HPF (ref 0–4)

## 2022-11-09 PROCEDURE — 96375 TX/PRO/DX INJ NEW DRUG ADDON: CPT

## 2022-11-09 PROCEDURE — 74011000636 HC RX REV CODE- 636: Performed by: EMERGENCY MEDICINE

## 2022-11-09 PROCEDURE — 83690 ASSAY OF LIPASE: CPT

## 2022-11-09 PROCEDURE — 74011250636 HC RX REV CODE- 250/636: Performed by: NURSE PRACTITIONER

## 2022-11-09 PROCEDURE — 96361 HYDRATE IV INFUSION ADD-ON: CPT

## 2022-11-09 PROCEDURE — 80053 COMPREHEN METABOLIC PANEL: CPT

## 2022-11-09 PROCEDURE — 85025 COMPLETE CBC W/AUTO DIFF WBC: CPT

## 2022-11-09 PROCEDURE — 36415 COLL VENOUS BLD VENIPUNCTURE: CPT

## 2022-11-09 PROCEDURE — 81025 URINE PREGNANCY TEST: CPT

## 2022-11-09 PROCEDURE — 65270000029 HC RM PRIVATE

## 2022-11-09 PROCEDURE — 96374 THER/PROPH/DIAG INJ IV PUSH: CPT

## 2022-11-09 PROCEDURE — 74011000250 HC RX REV CODE- 250: Performed by: NURSE PRACTITIONER

## 2022-11-09 PROCEDURE — 74177 CT ABD & PELVIS W/CONTRAST: CPT

## 2022-11-09 PROCEDURE — 99285 EMERGENCY DEPT VISIT HI MDM: CPT

## 2022-11-09 PROCEDURE — 81001 URINALYSIS AUTO W/SCOPE: CPT

## 2022-11-09 RX ORDER — MORPHINE SULFATE 4 MG/ML
4 INJECTION INTRAVENOUS ONCE
Status: COMPLETED | OUTPATIENT
Start: 2022-11-09 | End: 2022-11-09

## 2022-11-09 RX ORDER — HYDROMORPHONE HYDROCHLORIDE 1 MG/ML
0.5 INJECTION, SOLUTION INTRAMUSCULAR; INTRAVENOUS; SUBCUTANEOUS ONCE
Status: COMPLETED | OUTPATIENT
Start: 2022-11-09 | End: 2022-11-09

## 2022-11-09 RX ORDER — SODIUM CHLORIDE 9 MG/ML
125 INJECTION, SOLUTION INTRAVENOUS CONTINUOUS
Status: DISCONTINUED | OUTPATIENT
Start: 2022-11-09 | End: 2022-11-10

## 2022-11-09 RX ORDER — DIPHENHYDRAMINE HYDROCHLORIDE 50 MG/ML
25 INJECTION, SOLUTION INTRAMUSCULAR; INTRAVENOUS
Status: COMPLETED | OUTPATIENT
Start: 2022-11-09 | End: 2022-11-09

## 2022-11-09 RX ORDER — SODIUM CHLORIDE 9 MG/ML
1000 INJECTION, SOLUTION INTRAVENOUS ONCE
Status: COMPLETED | OUTPATIENT
Start: 2022-11-09 | End: 2022-11-09

## 2022-11-09 RX ORDER — ONDANSETRON 2 MG/ML
4 INJECTION INTRAMUSCULAR; INTRAVENOUS
Status: COMPLETED | OUTPATIENT
Start: 2022-11-09 | End: 2022-11-09

## 2022-11-09 RX ADMIN — ONDANSETRON 4 MG: 2 INJECTION INTRAMUSCULAR; INTRAVENOUS at 20:20

## 2022-11-09 RX ADMIN — HYDROMORPHONE HYDROCHLORIDE 0.5 MG: 1 INJECTION, SOLUTION INTRAMUSCULAR; INTRAVENOUS; SUBCUTANEOUS at 22:43

## 2022-11-09 RX ADMIN — SODIUM CHLORIDE 1000 ML: 9 INJECTION, SOLUTION INTRAVENOUS at 20:18

## 2022-11-09 RX ADMIN — SODIUM CHLORIDE 125 ML/HR: 9 INJECTION, SOLUTION INTRAVENOUS at 22:44

## 2022-11-09 RX ADMIN — CEFTRIAXONE 1 G: 1 INJECTION, POWDER, FOR SOLUTION INTRAMUSCULAR; INTRAVENOUS at 22:45

## 2022-11-09 RX ADMIN — MORPHINE SULFATE 4 MG: 4 INJECTION INTRAVENOUS at 20:20

## 2022-11-09 RX ADMIN — DIPHENHYDRAMINE HYDROCHLORIDE 25 MG: 50 INJECTION, SOLUTION INTRAMUSCULAR; INTRAVENOUS at 22:46

## 2022-11-09 RX ADMIN — IOPAMIDOL 100 ML: 755 INJECTION, SOLUTION INTRAVENOUS at 21:09

## 2022-11-10 ENCOUNTER — APPOINTMENT (OUTPATIENT)
Dept: ULTRASOUND IMAGING | Age: 37
DRG: 439 | End: 2022-11-10
Attending: STUDENT IN AN ORGANIZED HEALTH CARE EDUCATION/TRAINING PROGRAM
Payer: OTHER GOVERNMENT

## 2022-11-10 PROBLEM — G62.81 CRITICAL ILLNESS NEUROPATHY (HCC): Status: ACTIVE | Noted: 2022-11-10

## 2022-11-10 PROBLEM — I10 HYPERTENSION: Status: ACTIVE | Noted: 2022-11-10

## 2022-11-10 PROBLEM — E66.9 OBESITY: Status: ACTIVE | Noted: 2022-11-10

## 2022-11-10 PROBLEM — M21.372 LEFT FOOT DROP: Status: ACTIVE | Noted: 2022-11-10

## 2022-11-10 PROBLEM — N30.00 ACUTE CYSTITIS: Status: ACTIVE | Noted: 2022-11-10

## 2022-11-10 LAB
AMPHET UR QL SCN: NEGATIVE
ANION GAP SERPL CALC-SCNC: 7 MMOL/L (ref 5–15)
APPEARANCE UR: CLEAR
BACTERIA URNS QL MICRO: NEGATIVE /HPF
BARBITURATES UR QL SCN: NEGATIVE
BASOPHILS # BLD: 0.1 K/UL (ref 0–0.1)
BASOPHILS NFR BLD: 1 % (ref 0–1)
BENZODIAZ UR QL: NEGATIVE
BILIRUB UR QL: NEGATIVE
BUN SERPL-MCNC: 6 MG/DL (ref 6–20)
BUN/CREAT SERPL: 11 (ref 12–20)
CALCIUM SERPL-MCNC: 8.9 MG/DL (ref 8.5–10.1)
CANNABINOIDS UR QL SCN: POSITIVE
CHLORIDE SERPL-SCNC: 105 MMOL/L (ref 97–108)
CO2 SERPL-SCNC: 25 MMOL/L (ref 21–32)
COCAINE UR QL SCN: NEGATIVE
COLOR UR: ABNORMAL
CREAT SERPL-MCNC: 0.57 MG/DL (ref 0.55–1.02)
DIFFERENTIAL METHOD BLD: ABNORMAL
DRUG SCRN COMMENT,DRGCM: ABNORMAL
EOSINOPHIL # BLD: 0 K/UL (ref 0–0.4)
EOSINOPHIL NFR BLD: 0 % (ref 0–7)
EPITH CASTS URNS QL MICRO: ABNORMAL /LPF
ERYTHROCYTE [DISTWIDTH] IN BLOOD BY AUTOMATED COUNT: 20.5 % (ref 11.5–14.5)
ETHANOL SERPL-MCNC: <10 MG/DL
GLUCOSE SERPL-MCNC: 114 MG/DL (ref 65–100)
GLUCOSE UR STRIP.AUTO-MCNC: NEGATIVE MG/DL
HCT VFR BLD AUTO: 28.3 % (ref 35–47)
HGB BLD-MCNC: 8.7 G/DL (ref 11.5–16)
HGB UR QL STRIP: ABNORMAL
HYALINE CASTS URNS QL MICRO: ABNORMAL /LPF (ref 0–2)
IMM GRANULOCYTES # BLD AUTO: 0 K/UL (ref 0–0.04)
IMM GRANULOCYTES NFR BLD AUTO: 0 % (ref 0–0.5)
KETONES UR QL STRIP.AUTO: NEGATIVE MG/DL
LEUKOCYTE ESTERASE UR QL STRIP.AUTO: NEGATIVE
LYMPHOCYTES # BLD: 2.1 K/UL (ref 0.8–3.5)
LYMPHOCYTES NFR BLD: 22 % (ref 12–49)
MCH RBC QN AUTO: 24.3 PG (ref 26–34)
MCHC RBC AUTO-ENTMCNC: 30.7 G/DL (ref 30–36.5)
MCV RBC AUTO: 79.1 FL (ref 80–99)
METHADONE UR QL: NEGATIVE
MONOCYTES # BLD: 1.4 K/UL (ref 0–1)
MONOCYTES NFR BLD: 15 % (ref 5–13)
NEUTS SEG # BLD: 6 K/UL (ref 1.8–8)
NEUTS SEG NFR BLD: 62 % (ref 32–75)
NITRITE UR QL STRIP.AUTO: NEGATIVE
NRBC # BLD: 0 K/UL (ref 0–0.01)
NRBC BLD-RTO: 0 PER 100 WBC
OPIATES UR QL: POSITIVE
PCP UR QL: NEGATIVE
PH UR STRIP: 6 [PH] (ref 5–8)
PLATELET # BLD AUTO: 516 K/UL (ref 150–400)
PMV BLD AUTO: 9.1 FL (ref 8.9–12.9)
POTASSIUM SERPL-SCNC: 3.2 MMOL/L (ref 3.5–5.1)
PROT UR STRIP-MCNC: NEGATIVE MG/DL
RBC # BLD AUTO: 3.58 M/UL (ref 3.8–5.2)
RBC #/AREA URNS HPF: ABNORMAL /HPF (ref 0–5)
RBC MORPH BLD: ABNORMAL
SODIUM SERPL-SCNC: 137 MMOL/L (ref 136–145)
SP GR UR REFRACTOMETRY: 1.01 (ref 1–1.03)
UA: UC IF INDICATED,UAUC: ABNORMAL
UROBILINOGEN UR QL STRIP.AUTO: 0.2 EU/DL (ref 0.2–1)
WBC # BLD AUTO: 9.6 K/UL (ref 3.6–11)
WBC URNS QL MICRO: ABNORMAL /HPF (ref 0–4)

## 2022-11-10 PROCEDURE — 36415 COLL VENOUS BLD VENIPUNCTURE: CPT

## 2022-11-10 PROCEDURE — 76705 ECHO EXAM OF ABDOMEN: CPT

## 2022-11-10 PROCEDURE — 74011250636 HC RX REV CODE- 250/636

## 2022-11-10 PROCEDURE — 65270000029 HC RM PRIVATE

## 2022-11-10 PROCEDURE — 87086 URINE CULTURE/COLONY COUNT: CPT

## 2022-11-10 PROCEDURE — 74011250637 HC RX REV CODE- 250/637: Performed by: STUDENT IN AN ORGANIZED HEALTH CARE EDUCATION/TRAINING PROGRAM

## 2022-11-10 PROCEDURE — 85025 COMPLETE CBC W/AUTO DIFF WBC: CPT

## 2022-11-10 PROCEDURE — 80307 DRUG TEST PRSMV CHEM ANLYZR: CPT

## 2022-11-10 PROCEDURE — 74011000250 HC RX REV CODE- 250: Performed by: STUDENT IN AN ORGANIZED HEALTH CARE EDUCATION/TRAINING PROGRAM

## 2022-11-10 PROCEDURE — 81001 URINALYSIS AUTO W/SCOPE: CPT

## 2022-11-10 PROCEDURE — 99222 1ST HOSP IP/OBS MODERATE 55: CPT | Performed by: FAMILY MEDICINE

## 2022-11-10 PROCEDURE — 74011000250 HC RX REV CODE- 250

## 2022-11-10 PROCEDURE — 82077 ASSAY SPEC XCP UR&BREATH IA: CPT

## 2022-11-10 PROCEDURE — 74011250637 HC RX REV CODE- 250/637

## 2022-11-10 PROCEDURE — 80048 BASIC METABOLIC PNL TOTAL CA: CPT

## 2022-11-10 PROCEDURE — 74011250636 HC RX REV CODE- 250/636: Performed by: STUDENT IN AN ORGANIZED HEALTH CARE EDUCATION/TRAINING PROGRAM

## 2022-11-10 RX ORDER — THERA TABS 400 MCG
1 TAB ORAL DAILY
Status: DISCONTINUED | OUTPATIENT
Start: 2022-11-10 | End: 2022-11-11 | Stop reason: HOSPADM

## 2022-11-10 RX ORDER — SIMETHICONE 80 MG
80 TABLET,CHEWABLE ORAL
Status: DISCONTINUED | OUTPATIENT
Start: 2022-11-10 | End: 2022-11-11 | Stop reason: HOSPADM

## 2022-11-10 RX ORDER — ASPIRIN 325 MG/1
100 TABLET, FILM COATED ORAL DAILY
Status: DISCONTINUED | OUTPATIENT
Start: 2022-11-10 | End: 2022-11-11 | Stop reason: HOSPADM

## 2022-11-10 RX ORDER — ONDANSETRON 2 MG/ML
4 INJECTION INTRAMUSCULAR; INTRAVENOUS
Status: DISCONTINUED | OUTPATIENT
Start: 2022-11-10 | End: 2022-11-11 | Stop reason: HOSPADM

## 2022-11-10 RX ORDER — ONDANSETRON 4 MG/1
4 TABLET, ORALLY DISINTEGRATING ORAL
Status: DISCONTINUED | OUTPATIENT
Start: 2022-11-10 | End: 2022-11-11 | Stop reason: HOSPADM

## 2022-11-10 RX ORDER — ACETAMINOPHEN 325 MG/1
650 TABLET ORAL
Status: DISCONTINUED | OUTPATIENT
Start: 2022-11-10 | End: 2022-11-11 | Stop reason: HOSPADM

## 2022-11-10 RX ORDER — LORAZEPAM 1 MG/1
2 TABLET ORAL
Status: DISCONTINUED | OUTPATIENT
Start: 2022-11-10 | End: 2022-11-10

## 2022-11-10 RX ORDER — SODIUM CHLORIDE 0.9 % (FLUSH) 0.9 %
5-40 SYRINGE (ML) INJECTION AS NEEDED
Status: DISCONTINUED | OUTPATIENT
Start: 2022-11-10 | End: 2022-11-11 | Stop reason: HOSPADM

## 2022-11-10 RX ORDER — LORAZEPAM 1 MG/1
4 TABLET ORAL
Status: DISCONTINUED | OUTPATIENT
Start: 2022-11-10 | End: 2022-11-10

## 2022-11-10 RX ORDER — HYDROMORPHONE HYDROCHLORIDE 1 MG/ML
0.5 INJECTION, SOLUTION INTRAMUSCULAR; INTRAVENOUS; SUBCUTANEOUS
Status: DISCONTINUED | OUTPATIENT
Start: 2022-11-10 | End: 2022-11-11

## 2022-11-10 RX ORDER — SODIUM CHLORIDE 0.9 % (FLUSH) 0.9 %
5-40 SYRINGE (ML) INJECTION EVERY 8 HOURS
Status: DISCONTINUED | OUTPATIENT
Start: 2022-11-10 | End: 2022-11-11 | Stop reason: HOSPADM

## 2022-11-10 RX ORDER — ACETAMINOPHEN 650 MG/1
650 SUPPOSITORY RECTAL
Status: DISCONTINUED | OUTPATIENT
Start: 2022-11-10 | End: 2022-11-11 | Stop reason: HOSPADM

## 2022-11-10 RX ORDER — LORAZEPAM 2 MG/ML
2 INJECTION INTRAMUSCULAR
Status: DISCONTINUED | OUTPATIENT
Start: 2022-11-10 | End: 2022-11-10 | Stop reason: CLARIF

## 2022-11-10 RX ORDER — HYDROMORPHONE HYDROCHLORIDE 1 MG/ML
0.5 INJECTION, SOLUTION INTRAMUSCULAR; INTRAVENOUS; SUBCUTANEOUS
Status: DISCONTINUED | OUTPATIENT
Start: 2022-11-10 | End: 2022-11-10

## 2022-11-10 RX ORDER — LORAZEPAM 2 MG/ML
4 INJECTION INTRAMUSCULAR
Status: DISCONTINUED | OUTPATIENT
Start: 2022-11-10 | End: 2022-11-10 | Stop reason: CLARIF

## 2022-11-10 RX ORDER — MORPHINE SULFATE 2 MG/ML
2 INJECTION, SOLUTION INTRAMUSCULAR; INTRAVENOUS
Status: DISCONTINUED | OUTPATIENT
Start: 2022-11-10 | End: 2022-11-10

## 2022-11-10 RX ORDER — FOLIC ACID 1 MG/1
1 TABLET ORAL DAILY
Status: DISCONTINUED | OUTPATIENT
Start: 2022-11-10 | End: 2022-11-11 | Stop reason: HOSPADM

## 2022-11-10 RX ORDER — HYDROCHLOROTHIAZIDE 25 MG/1
25 TABLET ORAL DAILY
Status: DISCONTINUED | OUTPATIENT
Start: 2022-11-10 | End: 2022-11-10

## 2022-11-10 RX ORDER — SODIUM CHLORIDE, SODIUM LACTATE, POTASSIUM CHLORIDE, CALCIUM CHLORIDE 600; 310; 30; 20 MG/100ML; MG/100ML; MG/100ML; MG/100ML
250 INJECTION, SOLUTION INTRAVENOUS CONTINUOUS
Status: DISCONTINUED | OUTPATIENT
Start: 2022-11-10 | End: 2022-11-11

## 2022-11-10 RX ORDER — POLYETHYLENE GLYCOL 3350 17 G/17G
17 POWDER, FOR SOLUTION ORAL DAILY PRN
Status: DISCONTINUED | OUTPATIENT
Start: 2022-11-10 | End: 2022-11-11 | Stop reason: HOSPADM

## 2022-11-10 RX ADMIN — HYDROMORPHONE HYDROCHLORIDE 0.5 MG: 1 INJECTION, SOLUTION INTRAMUSCULAR; INTRAVENOUS; SUBCUTANEOUS at 05:33

## 2022-11-10 RX ADMIN — FOLIC ACID 1 MG: 1 TABLET ORAL at 08:08

## 2022-11-10 RX ADMIN — ONDANSETRON 4 MG: 2 INJECTION INTRAMUSCULAR; INTRAVENOUS at 03:58

## 2022-11-10 RX ADMIN — SODIUM CHLORIDE, PRESERVATIVE FREE 10 ML: 5 INJECTION INTRAVENOUS at 01:25

## 2022-11-10 RX ADMIN — SODIUM CHLORIDE, POTASSIUM CHLORIDE, SODIUM LACTATE AND CALCIUM CHLORIDE 250 ML/HR: 600; 310; 30; 20 INJECTION, SOLUTION INTRAVENOUS at 12:25

## 2022-11-10 RX ADMIN — SODIUM CHLORIDE, PRESERVATIVE FREE 10 ML: 5 INJECTION INTRAVENOUS at 06:49

## 2022-11-10 RX ADMIN — SODIUM CHLORIDE, POTASSIUM CHLORIDE, SODIUM LACTATE AND CALCIUM CHLORIDE 250 ML/HR: 600; 310; 30; 20 INJECTION, SOLUTION INTRAVENOUS at 01:12

## 2022-11-10 RX ADMIN — SODIUM CHLORIDE, PRESERVATIVE FREE 10 ML: 5 INJECTION INTRAVENOUS at 15:00

## 2022-11-10 RX ADMIN — CEFTRIAXONE 1 G: 1 INJECTION, POWDER, FOR SOLUTION INTRAMUSCULAR; INTRAVENOUS at 21:45

## 2022-11-10 RX ADMIN — SODIUM CHLORIDE, POTASSIUM CHLORIDE, SODIUM LACTATE AND CALCIUM CHLORIDE 250 ML/HR: 600; 310; 30; 20 INJECTION, SOLUTION INTRAVENOUS at 08:10

## 2022-11-10 RX ADMIN — HYDROMORPHONE HYDROCHLORIDE 0.5 MG: 1 INJECTION, SOLUTION INTRAMUSCULAR; INTRAVENOUS; SUBCUTANEOUS at 01:24

## 2022-11-10 RX ADMIN — HYDROMORPHONE HYDROCHLORIDE 0.5 MG: 1 INJECTION, SOLUTION INTRAMUSCULAR; INTRAVENOUS; SUBCUTANEOUS at 18:54

## 2022-11-10 RX ADMIN — HYDROMORPHONE HYDROCHLORIDE 0.5 MG: 1 INJECTION, SOLUTION INTRAMUSCULAR; INTRAVENOUS; SUBCUTANEOUS at 22:57

## 2022-11-10 RX ADMIN — Medication 100 MG: at 08:07

## 2022-11-10 RX ADMIN — SODIUM CHLORIDE, POTASSIUM CHLORIDE, SODIUM LACTATE AND CALCIUM CHLORIDE 250 ML/HR: 600; 310; 30; 20 INJECTION, SOLUTION INTRAVENOUS at 16:46

## 2022-11-10 RX ADMIN — HYDROMORPHONE HYDROCHLORIDE 0.5 MG: 1 INJECTION, SOLUTION INTRAMUSCULAR; INTRAVENOUS; SUBCUTANEOUS at 09:45

## 2022-11-10 RX ADMIN — SODIUM CHLORIDE, POTASSIUM CHLORIDE, SODIUM LACTATE AND CALCIUM CHLORIDE 250 ML/HR: 600; 310; 30; 20 INJECTION, SOLUTION INTRAVENOUS at 20:48

## 2022-11-10 RX ADMIN — RIVAROXABAN 20 MG: 10 TABLET, FILM COATED ORAL at 08:08

## 2022-11-10 RX ADMIN — HYDROCHLOROTHIAZIDE 25 MG: 25 TABLET ORAL at 08:08

## 2022-11-10 RX ADMIN — ONDANSETRON 4 MG: 2 INJECTION INTRAMUSCULAR; INTRAVENOUS at 09:45

## 2022-11-10 RX ADMIN — THERA TABS 1 TABLET: TAB at 08:08

## 2022-11-10 RX ADMIN — HYDROMORPHONE HYDROCHLORIDE 0.5 MG: 1 INJECTION, SOLUTION INTRAMUSCULAR; INTRAVENOUS; SUBCUTANEOUS at 13:53

## 2022-11-10 RX ADMIN — SIMETHICONE 80 MG: 80 TABLET, CHEWABLE ORAL at 14:59

## 2022-11-10 RX ADMIN — SODIUM CHLORIDE, PRESERVATIVE FREE 10 ML: 5 INJECTION INTRAVENOUS at 21:45

## 2022-11-10 RX ADMIN — ONDANSETRON 4 MG: 2 INJECTION INTRAMUSCULAR; INTRAVENOUS at 22:57

## 2022-11-10 NOTE — H&P
Admission H&P     Name: Kinjal Palmer MRN: [id number]    Sex: Female   YOB: 1985  Age: 30 y.o. PCP: Brie Roca NP      Source of Information: patient, medical records    Chief complaint: Abdominal pain    History of Present Illness  Kinjal Palmer is a 30 y.o. female with PMHx of pancreatitis, HTN,left foot drop, unprovoked DVT, critical illness neuropathy, obesity who presents to the ED complaining of abdominal pain. Patient reports 2-day history of upper epigastric abdominal pain and lower back pain scale 9/10. Associated with nausea and 2 episodes of vomiting nonbilious nonbloody stained. Reports chills with no fever. Reports decreased urine output, pink-tinged urine. 1 previous hospitalization for pancreatitis in [address] [place] [address] on 10/22, no cause identified. Endorses pain while passing urine, Denies diarrhea, abdominal distention,increased frequency, urgency, SOB, chest pain. Recent dietary changes include low fat diet  Reports >5 drinks several times in a month  No family h/o pancreatitis    COVID Questions:   Experiencing any of the following symptoms: fever, chills, cough, SOB, diarrhea, URI symptoms. no  Any Sick contacts with fever, cough, diarrhea, SOB, URI symptoms. no  Traveled out of state or out of country. no  Lives with daughter. Has been staying at home.     In the ED:  Vitals: Temp 98.5   /73      RR 19   SatO2  99% on RA  Labs: wbc 15.8, Hgb 10.6, K 3.2, Lipase 615, UA bact 3+, leuk small, nitrites pos, blood mod, preg neg, bili neg  Imaging: CT A/P Pancreatic tail pancreatitis given the clinical history  Treatment: Dilaudid 0.5 mg x 1, NS 1L, morphine 4 mg x 1, Zofran 4 mg x 1    EKG: Not done    Patient Vitals for the past 12 hrs:   Temp Pulse Resp BP SpO2   11/10/22 0230 -- -- -- 103/77 96 %   11/10/22 0129 -- 97 16 109/71 97 %   11/10/22 0048 -- -- -- 114/78 --   11/09/22 2313 -- 85 16 (!) 106/57 97 %   11/09/22 2034 98.5 °F (36.9 °C) (!) 101 20 114/82 95 % 11/09/22 1832 98.1 °F (36.7 °C) (!) 125 19 120/73 99 %       Review of Systems  Review of Systems   Constitutional:  Positive for chills. Negative for fever. HENT:  Negative for congestion and sinus pressure. Eyes:  Negative for discharge and redness. Respiratory:  Negative for cough and shortness of breath. Cardiovascular:  Negative for chest pain and leg swelling. Gastrointestinal:  Positive for abdominal pain, nausea and vomiting. Negative for abdominal distention and diarrhea. Endocrine: Negative for cold intolerance and heat intolerance. Genitourinary:  Positive for decreased urine volume and difficulty urinating. Negative for dysuria, frequency and urgency. Musculoskeletal:  Negative for arthralgias and myalgias. Allergic/Immunologic: Negative for immunocompromised state. Neurological:  Negative for weakness and headaches. Hematological:  Does not bruise/bleed easily. Psychiatric/Behavioral:  Negative for confusion and sleep disturbance. Home Medications  Prior to Admission medications    Medication Sig Start Date End Date Taking? Authorizing Provider   hydroCHLOROthiazide (HYDRODIURIL) 25 mg tablet Take 1 Tablet by mouth in the morning. 8/9/22  Yes Eleanor Hua NP   rivaroxaban (Xarelto) 20 mg tab tablet Take 1 Tablet by mouth daily. 6/27/22  Yes Eleanor Hua NP   multivitamin (ONE A DAY) tablet Take 1 Tablet by mouth daily.    Yes Provider, Historical   ondansetron (ZOFRAN ODT) 4 mg disintegrating tablet  8/31/22   Provider, Historical   HYDROmorphone (DILAUDID) 4 mg tablet  10/23/22   Provider, Historical   amitriptyline (ELAVIL) 50 mg tablet 1 p.o. nightly  Indications: neuropathic pain  Patient not taking: Reported on 11/10/2022 6/20/22   Shauna Lancaster MD   baclofen (LIORESAL) 10 mg tablet One half tab p.o. nightly  Indications: muscle spasms caused by a spinal disease  Patient not taking: Reported on 11/10/2022 6/20/22   Shauna Lancaster MD Allergies  Allergies   Allergen Reactions    Other Medication Hives     Sulfa drugs    Sulfa (Sulfonamide Antibiotics) Hives       Past Medical History  Past Medical History:   Diagnosis Date    Pancreatitis 10/20/2022       Previous Hospitalization(s)  No past surgical history on file. Family History  Family History   Problem Relation Age of Onset    Ovarian Cancer Mother     Hypertension Father        Social History  Alcohol history:   Last drink 3 days ago. Reports. >5 drinks once every 3 weeks. Smoking history: Former smoker, smoked 1/2 ppd x 15 years, quit 3 years ago  Illicit drug history: Occasional marijuana gummies for pain due to neuropathy  Living arrangement: patient lives with their family. Ambulates: Independently     Vital Signs  Visit Vitals  /77   Pulse 97   Temp 98.5 °F (36.9 °C)   Resp 16   Ht 5' 3\" (1.6 m)   Wt 170 lb (77.1 kg)   SpO2 96%   BMI 30.11 kg/m²       Physical Examination:  Physical Exam  Constitutional:       General: She is not in acute distress. Appearance: Normal appearance. She is not ill-appearing. HENT:      Head: Normocephalic and atraumatic. Eyes:      Extraocular Movements: Extraocular movements intact. Conjunctiva/sclera: Conjunctivae normal.   Cardiovascular:      Rate and Rhythm: Normal rate and regular rhythm. Heart sounds: No murmur heard. No gallop. Pulmonary:      Effort: Pulmonary effort is normal.      Breath sounds: Normal breath sounds. No wheezing or rales. Abdominal:      General: Bowel sounds are normal.      Palpations: Abdomen is soft. Tenderness: There is abdominal tenderness. There is right CVA tenderness and left CVA tenderness. Comments: RUQ tenderness noted   Musculoskeletal:         General: Normal range of motion. Cervical back: Normal range of motion and neck supple. Right lower leg: No edema. Left lower leg: No edema. Skin:     General: Skin is warm and dry.    Neurological: General: No focal deficit present. Mental Status: She is alert and oriented to person, place, and time. Motor: No weakness. Psychiatric:         Mood and Affect: Mood normal.         Behavior: Behavior normal.         Laboratory Data  Recent Results (from the past 8 hour(s))   URINALYSIS W/MICROSCOPIC    Collection Time: 11/09/22  8:30 PM   Result Value Ref Range    Color DARK YELLOW      Appearance TURBID (A) CLEAR      Specific gravity 1.028 1.003 - 1.030      pH (UA) 6.0 5.0 - 8.0      Protein 100 (A) NEG mg/dL    Glucose Negative NEG mg/dL    Ketone 15 (A) NEG mg/dL    Blood MODERATE (A) NEG      Urobilinogen 1.0 0.2 - 1.0 EU/dL    Nitrites Positive (A) NEG      Leukocyte Esterase SMALL (A) NEG      WBC 5-10 0 - 4 /hpf    RBC 0-5 0 - 5 /hpf    Epithelial cells MANY (A) FEW /lpf    Bacteria 3+ (A) NEG /hpf    Amorphous Crystals 4+ (A) NEG    CA Oxalate crystals 2+ (A) NEG   URINE CULTURE HOLD SAMPLE    Collection Time: 11/09/22  8:30 PM    Specimen: Serum; Urine   Result Value Ref Range    Urine culture hold        Urine on hold in Microbiology dept for 2 days. If unpreserved urine is submitted, it cannot be used for addtional testing after 24 hours, recollection will be required. BILIRUBIN, CONFIRM    Collection Time: 11/09/22  8:30 PM   Result Value Ref Range    Bilirubin UA, confirm Negative     HCG URINE, QL. - POC    Collection Time: 11/09/22  8:56 PM   Result Value Ref Range    Pregnancy test,urine (POC) Negative NEG         Imaging  CXR Results  (Last 48 hours)      None          CT Results  (Last 48 hours)                 11/09/22 2120  CT ABD PELV W CONT Final result    Impression:      1. Pancreatic tail pancreatitis given the clinical history. Pancreatic neoplasm   is less likely in this age group and clinical scenario. 2. Possible hepatic steatosis.    3. Heterogeneous endocervical canal. Evaluate with physical exam.   Recommendation: Triphasic CT pancreas in 4-6 weeks to reevaluate the tail. Narrative:  EXAM: CT ABD PELV W CONT       INDICATION: Upper abdominal pain and back pain. Previous pancreatitis. Leukocytosis and elevated alkaline phosphatase. Hematuria and elevated lipase. COMPARISON: None. CONTRAST: 100 mL of Isovue-370. ORAL CONTRAST: None       TECHNIQUE:    Following the uneventful intravenous administration of contrast, thin axial   images were obtained through the abdomen and pelvis. Coronal and sagittal   reconstructions were generated. CT dose reduction was achieved through use of a   standardized protocol tailored for this examination and automatic exposure   control for dose modulation. FINDINGS:    LOWER THORAX: No significant abnormality in the incidentally imaged lower chest.   LIVER: Hypoattenuation may represent hepatic steatosis. No mass. Smooth surface. BILIARY TREE: Gallbladder is within normal limits. CBD is not dilated. SPLEEN: Small, autoinfarcted splenic tissue. PANCREAS: Enlarged pancreatic tail with surrounding mild inflammation. Mild   atrophy. Small surrounding lymph nodes near the head. ADRENALS: Unremarkable. KIDNEYS: Bilateral nephrolithiasis versus early excretion. No solid renal mass   or hydronephrosis. STOMACH: Unremarkable. SMALL BOWEL: No dilatation or wall thickening. COLON: No dilatation or wall thickening. APPENDIX: Appendectomy. PERITONEUM: No ascites or pneumoperitoneum. RETROPERITONEUM: No lymphadenopathy or aortic aneurysm. REPRODUCTIVE ORGANS: Left posterior uterine fibroid measures 2.5 cm. Heterogeneous endocervical canal.   URINARY BLADDER: Incomplete distention, limited evaluation. BONES: No destructive bone lesion. ABDOMINAL WALL: No mass or hernia. ADDITIONAL COMMENTS: Mild muscle atrophy.                        Assessment and Plan     Umm Erickson is a 30 y.o. female with a PMHx of pancreatitis, HTN,left foot drop, unprovoked DVT, critical illness neuropathy, obesity who is admitted for pancreatitis. Pancreatitis: H/o hospitalization for pancreatitis in Oct 2022 in [facility]. Reports nausea,vomit,epigastric TTP on exam. Lipase 615. CT scan showed pancreatic tail pancreatitis given the clinical history. DDx alcoholism vs gallstones vs drug-induced vs hypertriglyceridemia. Recent lipid panel wnl.  - Admit to medical  - Vital signs per unit protocol  - IVF LR @ 250 mL/hr  - Dilaudid 0.5 mg q4h for pain  - Zofran for nausea  - UDS  - RUQ ultrasound abdomen, look for gallstones/cholecystitis  - NPO, ADAT  - Daily BMP/CBC  - Radiology rec triphasic CT pancreas in 4-6 weeks to reevaluate the tail. UTI/pyelonephritis: UA bact 3+, leuk small, nitrites pos, blood mod, preg neg. Bilateral ?CVA tenderness. CT A/P with zachariah nephrolithiasis versus early excretion. No solid renal mass/ hydronephrosis  - Urine culture  - CTX 1 g 10 days, cover for possible pyelonephritis    Binge drinking: w concern for alcohol use disorder. Last drink 3 days ago. Reports>5 drinks once every 3 weeks. Patient feels need to cut down alcohol and guilty of usage. Not used as eye opener,  or annoyed (CAGE). Contemplative stage of quitting alcohol.  - CIWA with Ativan  - Continue alcohol cessation and further work-up outpatient    Hypokalemia : Poa K 3.2  - Replete prn  - Monitor on labs    Hypercalcemia : poa Ca 10. 5. corrected Ca 11.1,with elevated alk phosph. Likely 2/2 to HCTZ  - Ionized calcium    HTN: POA BP on admission 120/73 At this time, 109/71.  - Continuing home HCTZ 25 mg once daily   - Will continue to monitor at this time and readjust as BP's trend. H/o unprovoked DVT: In upper extremity (2019)  - Continue home Xarelto 20 mg once daily    Left Foot drop: Chronic 3 years  - Will continue to monitor    Critical illness neuropathy: Previously on amitriptyline 50 mg once daily. Follows Dr Tyesha Medeiros MD (Neurology). - Will continue to monitor    Obesity: The pt's Body mass index is 30.11 kg/m².   - Encouraging lifestyle modifications and further follow up outpatient. FEN/GI - NPO. Lactated Ringer's at 250 mL/hr. Activity - Ambulate as tolerated  DVT prophylaxis - Xarelto   GI prophylaxis - Not indicated at this time  Fall prophylaxis - Not indicated at this time. Disposition - Admit to Medical. Plan to d/c to TBD. Consulting CM  Code Status - Full. Discussed with patient / caregivers. Next of Kin Name and [facility] (Spouse) [phone]     Patient Hansel Padgett will be discussed with Dr. Charlotte López.     10:41 PM, 11/10/22  Dick Shaw MD  Family Medicine Resident       For Billing    Chief Complaint   Patient presents with    Abdominal Pain    Back Pain       Hospital Problems  Date Reviewed: 8/9/2022            Codes Class Noted POA    Left foot drop ICD-10-CM: M21.372  ICD-9-CM: 736.79  11/10/2022 Unknown        Critical illness neuropathy ([facility] [address] ICD-10-CM: G62.81  ICD-9-CM: 357.82  11/10/2022 Unknown        Hypertension ICD-10-CM: I10  ICD-9-CM: 401.9  11/10/2022 Unknown        Acute cystitis ICD-10-CM: N30.00  ICD-9-CM: 595.0  11/10/2022 Unknown        Obesity ICD-10-CM: E66.9  ICD-9-CM: 278.00  11/10/2022 Unknown        * (Principal) Pancreatitis ICD-10-CM: K85.90  ICD-9-CM: 744.8  11/9/2022 Unknown        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        History of blood clots ICD-10-CM: M91.693  ICD-9-CM: V12.51  10/12/2021 Yes

## 2022-11-10 NOTE — PROGRESS NOTES
Problem: Falls - Risk of  Goal: *Absence of Falls  Description: Document Fiorella [place] Fall Risk and appropriate interventions in the flowsheet. Outcome: Progressing Towards Goal  Note: Fall Risk Interventions:  Mobility Interventions: Assess mobility with egress test         Medication Interventions: Patient to call before getting OOB, Teach patient to arise slowly         History of Falls Interventions:  Investigate reason for fall, Room close to nurse's station         Problem: Patient Education: Go to Patient Education Activity  Goal: Patient/Family Education  Outcome: Progressing Towards Goal

## 2022-11-10 NOTE — ED PROVIDER NOTES
61-year-old female in with epigastric pain. With a past medical history of pancreatitis, hypertension, right foot drop, DVT to her right arm. Patient is on HCTZ and Xarelto at home. Patient states she was seen at [facility] the end of October for abdominal pain and states she was diagnosed with some inflammation on the tail of her pancreas. She was admitted. Patient has a GI appointment in December. Patient is tearful during exam.      Abdominal Pain   Associated symptoms include nausea, constipation, dysuria, frequency and back pain. Pertinent negatives include no fever, no vomiting, no hematuria, no headaches and no chest pain. Back Pain   Associated symptoms include abdominal pain and dysuria. Pertinent negatives include no chest pain, no fever, no headaches and no pelvic pain. Past Medical History:   Diagnosis Date    Pancreatitis 10/20/2022       No past surgical history on file. Family History:   Problem Relation Age of Onset    Ovarian Cancer Mother     Hypertension Father        Social History     Socioeconomic History    Marital status:      Spouse name: Not on file    Number of children: Not on file    Years of education: Not on file    Highest education level: Not on file   Occupational History    Not on file   Tobacco Use    Smoking status: Never    Smokeless tobacco: Never   Vaping Use    Vaping Use: Never used   Substance and Sexual Activity    Alcohol use:  Yes     Alcohol/week: 3.0 standard drinks     Types: 1 Glasses of wine, 1 Cans of beer, 1 Shots of liquor per week     Comment: ocassionally    Drug use: Never    Sexual activity: Not on file   Other Topics Concern    Not on file   Social History Narrative    Not on file     Social Determinants of Health     Financial Resource Strain: Not on file   Food Insecurity: Not on file   Transportation Needs: Not on file   Physical Activity: Not on file   Stress: Not on file   Social Connections: Not on file   Intimate Partner Violence: Not on file   Housing Stability: Not on file         ALLERGIES: Other medication and Sulfa (sulfonamide antibiotics)    Review of Systems   Constitutional:  Positive for activity change and appetite change. Negative for diaphoresis, fatigue and fever. HENT:  Negative for congestion, ear pain, sinus pain and sore throat. Eyes:  Negative for pain, discharge and itching. Respiratory:  Negative for apnea, cough, choking and chest tightness. Cardiovascular:  Negative for chest pain and leg swelling. Gastrointestinal:  Positive for abdominal pain, constipation and nausea. Negative for blood in stool, rectal pain and vomiting. Endocrine: Negative for cold intolerance. Genitourinary:  Positive for difficulty urinating, dysuria and frequency. Negative for flank pain, hematuria, pelvic pain and vaginal discharge. Musculoskeletal:  Positive for back pain. Skin:  Negative for color change, pallor and rash. Allergic/Immunologic: Negative for environmental allergies. Neurological:  Negative for dizziness, facial asymmetry and headaches. Hematological:  Negative for adenopathy. Psychiatric/Behavioral:  Negative for agitation, behavioral problems, confusion and self-injury. Vitals:    11/09/22 1832 11/09/22 2034   BP: 120/73 114/82   Pulse: (!) 125 (!) 101   Resp: 19 20   Temp: 98.1 °F (36.7 °C) 98.5 °F (36.9 °C)   SpO2: 99% 95%   Weight: 77.1 kg (170 lb)    Height: 5' 3\" (1.6 m)             Physical Exam  Constitutional:       Appearance: She is well-developed and normal weight. HENT:      Head: Normocephalic and atraumatic. Eyes:      Extraocular Movements: Extraocular movements intact. Pupils: Pupils are equal, round, and reactive to light. Cardiovascular:      Rate and Rhythm: Normal rate and regular rhythm. Pulmonary:      Effort: Pulmonary effort is normal.      Breath sounds: Normal breath sounds. Abdominal:      General: Abdomen is flat.  Bowel sounds are normal. Palpations: Abdomen is rigid. Tenderness: There is abdominal tenderness in the epigastric area. Hernia: No hernia is present. Skin:     General: Skin is warm and dry. Capillary Refill: Capillary refill takes less than 2 seconds. Neurological:      General: No focal deficit present. Mental Status: She is alert and oriented to person, place, and time. Psychiatric:         Mood and Affect: Mood normal.         Behavior: Behavior normal.        MDM  Number of Diagnoses or Management Options  Alcohol use  Chronic anticoagulation  Class 1 obesity with body mass index (BMI) of 30.0 to 30.9 in adult, unspecified obesity type, unspecified whether serious comorbidity present  Hepatic steatosis  Hypokalemia  Hyponatremia  Idiopathic acute pancreatitis without infection or necrosis  Nephrolithiasis  Primary hypertension  Diagnosis management comments: 72-year-old female with epigastric pain. Patient with elevated lipase. CT does show inflammation the pancreas. She also has a urinary tract infection with positive nitrites. Patient was treated with morphine in the emergency room that caused some eye itching. She was given 25 mg of Benadryl. She was also given 1 L of normal saline started at 125 an hour. Patient is given 0.5 mg of Dilaudid, 15 mg of IV Toradol, and 1 g of Rocephin. Patient for admission to hospitalist service, discussed with attending MD.     Presentation, management, and disposition were discussed with the attending physician, Dr. Kristie Dickinson, who is in agreement with plan of care. Patient is being admitted to the hospital , and the attending will see the patient.            Amount and/or Complexity of Data Reviewed  Clinical lab tests: reviewed and ordered  Tests in the radiology section of CPT®: reviewed and ordered  Decide to obtain previous medical records or to obtain history from someone other than the patient: yes  Review and summarize past medical records: yes  Discuss the patient with other providers: yes           Procedures

## 2022-11-10 NOTE — ED NOTES
[facility] Consult for Admission  10:07 PM    ED Room Number: D27/D27  Patient Name and age:  Lauren Mcgraw 30 y.o.  female  Working Diagnosis:   1.  Idiopathic acute pancreatitis without infection or necrosis        COVID-19 Suspicion:  no  Sepsis present:  no  Reassessment needed: N/A  Code Status:  Full Code  Readmission: no  Isolation Requirements:  no  Recommended Level of Care:  med/surg  Department:[facility] ED - [phone]  Other:  recurrent pancreatitis

## 2022-11-10 NOTE — PROGRESS NOTES
Reason for Admission: Pancreatis                   RUR Score: 6% LOW                    Plan for utilizing home health: Per recommendation pending pt's progression during hospitalization stay       PCP: First and Last name:  Tiffanie Smiley NP   Name of Practice:    Are you a current patient: Yes/No: Yes   Approximate date of last visit: 2 weeks ago    Can you participate in a virtual visit with your PCP: Yes                    Current Advanced Directive/Advance Care Plan: Full Code- no AMD on file. Pt is not interested in arranging any- understands LNOK hierarchy. Healthcare Decision Maker:  Updated to reflect information obtained                   Transition of Care Plan:                     Pt is a 30year old,  female, admitted with pancreatitis. Pt was AOX4 when speaking with CM, confirming address, emergency contact and PCP. Pt lives with her  and their 9 y/o daughter- in a two level home and is independent with ADLS, has a back brace available and drives at baseline. Pt states she has been to rehab- (in [place]). Pt confirmed insurance coverage of [employer]- states no problems affording or accessing medications. Pt states her  will drive her home at time of discharge and as needed. Pt remains on Ortho- pending medical stability- pt states no questions or concerns at this time- anticipate her to go home with family and follow up appointments but will continue to follow and assist as needed. Care Management Interventions  PCP Verified by CM:  Yes  Mode of Transport at Discharge: Self  Transition of Care Consult (CM Consult): Discharge Planning  MyChart Signup: No  Discharge Durable Medical Equipment: No  Health Maintenance Reviewed: Yes  Physical Therapy Consult: No  Occupational Therapy Consult: No  Speech Therapy Consult: No  Support Systems: Spouse/Significant Other, Child(artis), Other Family Member(s)  Confirm Follow Up Transport: Family  The Patient and/or Patient Representative was Provided with a Choice of Provider and Agrees with the Discharge Plan?: Yes  Freedom of Choice List was Provided with Basic Dialogue that Supports the Patient's Individualized Plan of Care/Goals, Treatment Preferences and Shares the Quality Data Associated with the Providers?: Yes  Discharge Location  Patient Expects to be Discharged to[de-identified] Home with family assistance     CIRO Cook, [address]

## 2022-11-10 NOTE — PROGRESS NOTES
[facility] Family Medicine Service Daily Progress Note    24 Hour Events: NAEO    SUBJECTIVE: Patient reports improvement of pain to 6/10 from 9/10. No further episodes of vomiting. Denies chest pain, SOB, dizziness. OBJECTIVE:      Vitals: Visit Vitals  /77   Pulse 97   Temp 98.5 °F (36.9 °C)   Resp 16   Ht 5' 3\" (1.6 m)   Wt 170 lb (77.1 kg)   SpO2 96%   BMI 30.11 kg/m²       Physical Exam:  General: No acute distress  Respiratory: Clear lung fields bilaterally, no wheeze, rales  Cardiovascular: Regular rate, rhythm no murmurs gallops  GI: Nondistended. + bowel sounds. Epigastric and RUQ tenderness noted. Extremities: No LE edema. Skin: Warm, dry.   Neuro: Alert and oriented    Inpatient Medications  Current Facility-Administered Medications   Medication Dose Route Frequency    sodium chloride (NS) flush 5-40 mL  5-40 mL IntraVENous Q8H    sodium chloride (NS) flush 5-40 mL  5-40 mL IntraVENous PRN    acetaminophen (TYLENOL) tablet 650 mg  650 mg Oral Q6H PRN    Or    acetaminophen (TYLENOL) suppository 650 mg  650 mg Rectal Q6H PRN    polyethylene glycol (MIRALAX) packet 17 g  17 g Oral DAILY PRN    ondansetron (ZOFRAN ODT) tablet 4 mg  4 mg Oral Q8H PRN    Or    ondansetron (ZOFRAN) injection 4 mg  4 mg IntraVENous Q6H PRN    lactated Ringers infusion  250 mL/hr IntraVENous CONTINUOUS    hydroCHLOROthiazide (HYDRODIURIL) tablet 25 mg  25 mg Oral DAILY    therapeutic multivitamin (THERAGRAN) tablet 1 Tablet  1 Tablet Oral DAILY    rivaroxaban (XARELTO) tablet 20 mg  20 mg Oral DAILY WITH BREAKFAST    cefTRIAXone (ROCEPHIN) 1 g in 0.9% sodium chloride 10 mL IV syringe  1 g IntraVENous Q24H    HYDROmorphone (DILAUDID) syringe 0.5 mg  0.5 mg IntraVENous B3K PRN    folic acid (FOLVITE) tablet 1 mg  1 mg Oral DAILY    thiamine mononitrate (B-1) tablet 100 mg  100 mg Oral DAILY    LORazepam (ATIVAN) injection 2 mg  2 mg IntraVENous Q1H PRN    LORazepam (ATIVAN) injection 4 mg  4 mg IntraVENous Q1H PRN Allergies  Allergies   Allergen Reactions    Other Medication Hives     Sulfa drugs    Sulfa (Sulfonamide Antibiotics) Hives       CBC:  Recent Labs     11/09/22  1841   WBC 15.8*   HGB 10.6*   HCT 33.7*   *       Metabolic Panel:  Recent Labs     11/09/22  1841   *   K 3.2*      CO2 24   BUN 8   CREA 0.69   *   CA 10.5*   ALB 3.3*   ALT 30            Assessment and Plan   Candice Luo is a 30 y.o. female with a PMHx of pancreatitis, HTN,left foot drop, unprovoked DVT, critical illness neuropathy, obesity who is admitted for pancreatitis. Pancreatitis: H/o hospitalization for pancreatitis in Oct 2022 in [address] [place] [address]. Reports nausea,vomit,epigastric TTP on exam. Lipase 615. CT scan showed pancreatic tail pancreatitis given the clinical history. DDx alcoholism vs gallstones vs drug-induced vs hypertriglyceridemia. Recent lipid panel wnl.  - Admit to medical  - Vital signs per unit protocol  - IVF LR @ 250 mL/hr  - Dilaudid 0.5 mg q4h for pain  - Zofran for nausea  - UDS  - RUQ ultrasound abdomen, look for gallstones/cholecystitis  - NPO, ADAT  - Daily BMP/CBC  - Radiology rec triphasic CT pancreas in 4-6 weeks to reevaluate the tail. UTI/pyelonephritis: UA bact 3+, leuk small, nitrites pos, blood mod, preg neg. Bilateral ?CVA tenderness. CT A/P with zachariah nephrolithiasis versus early excretion. No solid renal mass/ hydronephrosis  - Urine culture  - CTX 1 g 10 days, cover for possible pyelonephritis    Binge drinking: w concern for alcohol use disorder. Last drink 3 days ago. Reports>5 drinks once every 3 weeks. Patient feels need to cut down alcohol and guilty of usage. Not used as eye opener,  or annoyed (CAGE). Contemplative stage of quitting alcohol.  - CIWA with Ativan  - Continue alcohol cessation and further work-up outpatient    Hypokalemia : Poa K 3.2  - Replete prn  - Monitor on labs    Hypercalcemia : poa Ca 10. 5. corrected Ca 11.1,with elevated alk phosph.  Likely 2/2 to HCTZ  - Ionized calcium    HTN: POA BP on admission 120/73 At this time, 109/71.  - Continuing home HCTZ 25 mg once daily   - Will continue to monitor at this time and readjust as BP's trend. H/o unprovoked DVT: In upper extremity (2019)  - Continue home Xarelto 20 mg once daily    Left Foot drop: Chronic 3 years  - Will continue to monitor    Critical illness neuropathy: Previously on amitriptyline 50 mg once daily. Follows Dr Luis Bauer MD (Neurology). - Will continue to monitor    Obesity: The pt's Body mass index is 30.11 kg/m². - Encouraging lifestyle modifications and further follow up outpatient.     Giles Reynolds MD  Family Medicine Resident       For Billing    Chief Complaint   Patient presents with    Abdominal Pain    Back Pain       Hospital Problems  Date Reviewed: 8/9/2022            Codes Class Noted POA    Left foot drop ICD-10-CM: M21.372  ICD-9-CM: 736.79  11/10/2022 Unknown        Critical illness neuropathy ([facility] [address] ICD-10-CM: G62.81  ICD-9-CM: 357.82  11/10/2022 Unknown        Hypertension ICD-10-CM: I10  ICD-9-CM: 401.9  11/10/2022 Unknown        Acute cystitis ICD-10-CM: N30.00  ICD-9-CM: 595.0  11/10/2022 Unknown        Obesity ICD-10-CM: E66.9  ICD-9-CM: 278.00  11/10/2022 Unknown        * (Principal) Pancreatitis ICD-10-CM: K85.90  ICD-9-CM: 323.4  11/9/2022 Unknown        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        History of blood clots ICD-10-CM: I36.304  ICD-9-CM: V12.51  10/12/2021 Yes

## 2022-11-10 NOTE — PROGRESS NOTES
Senior Resident Admission Note     CC: abdominal pain, pancreatitis     HPI:  Nathaniel Tompkins is a 30 y.o. female w/ a PMHX of pancreatitis, HTN,  DVT, Lt foot drop, critical illness neuropathy who presents to the ER complaining of epigastric and back pain that started the day prior around midnight associated with nausea and 2 episodes of non-bloody emesis. Hx of recent pancreatitis, was admitted at [facility] for the same. .     Chart reviewed. Patient seen, examined, and discussed with Dr. Marsha Chaudhari (PGY-1). See H&P for more details. A/P: Admit to medical . Code status: Full Code. Pancreatitis: Patient with previous admissions in the past for pancreatitis. Patient endorses episodes of binge drinking every several weeks, last drink 11/06. Lipase 600's in the ER. CT scan showed , S/p Pancreatic tail pancreatitis and hepatic steatosis. - Admit to Medicine  - Vital signs per unit protocol  - IVF RL @ 250 cc/hr  - Dilaudid 0.5-1 mg IV q4hr for  severe pain. - Zofran prn for nausea  - CMP daily  given elevated AP and significant UQ pain -- check RUQ  US   - Continue CLD   - UDS      I agree with remaining assessment and plan as documented in Dr. Jameson Gtz's Full H&P. Pt discussed with Dr. Karla Tanner (on-call attending physician).     April Portillo MD  Family Medicine Resident, PGY-3

## 2022-11-11 VITALS
SYSTOLIC BLOOD PRESSURE: 122 MMHG | RESPIRATION RATE: 16 BRPM | BODY MASS INDEX: 30.12 KG/M2 | WEIGHT: 170 LBS | HEART RATE: 80 BPM | HEIGHT: 63 IN | TEMPERATURE: 98.2 F | OXYGEN SATURATION: 97 % | DIASTOLIC BLOOD PRESSURE: 85 MMHG

## 2022-11-11 LAB
ANION GAP SERPL CALC-SCNC: 10 MMOL/L (ref 5–15)
ANION GAP SERPL CALC-SCNC: 9 MMOL/L (ref 5–15)
BACTERIA SPEC CULT: NORMAL
BASOPHILS # BLD: 0.1 K/UL (ref 0–0.1)
BASOPHILS NFR BLD: 1 % (ref 0–1)
BUN SERPL-MCNC: 2 MG/DL (ref 6–20)
BUN SERPL-MCNC: 3 MG/DL (ref 6–20)
BUN/CREAT SERPL: 5 (ref 12–20)
BUN/CREAT SERPL: 7 (ref 12–20)
CALCIUM SERPL-MCNC: 8.4 MG/DL (ref 8.5–10.1)
CALCIUM SERPL-MCNC: 8.6 MG/DL (ref 8.5–10.1)
CHLORIDE SERPL-SCNC: 91 MMOL/L (ref 97–108)
CHLORIDE SERPL-SCNC: 95 MMOL/L (ref 97–108)
CO2 SERPL-SCNC: 27 MMOL/L (ref 21–32)
CO2 SERPL-SCNC: 27 MMOL/L (ref 21–32)
CREAT SERPL-MCNC: 0.39 MG/DL (ref 0.55–1.02)
CREAT SERPL-MCNC: 0.42 MG/DL (ref 0.55–1.02)
DIFFERENTIAL METHOD BLD: ABNORMAL
EOSINOPHIL # BLD: 0 K/UL (ref 0–0.4)
EOSINOPHIL NFR BLD: 0 % (ref 0–7)
ERYTHROCYTE [DISTWIDTH] IN BLOOD BY AUTOMATED COUNT: 19.8 % (ref 11.5–14.5)
GLUCOSE SERPL-MCNC: 101 MG/DL (ref 65–100)
GLUCOSE SERPL-MCNC: 84 MG/DL (ref 65–100)
HCT VFR BLD AUTO: 27.4 % (ref 35–47)
HGB BLD-MCNC: 8.6 G/DL (ref 11.5–16)
IMM GRANULOCYTES # BLD AUTO: 0 K/UL (ref 0–0.04)
IMM GRANULOCYTES NFR BLD AUTO: 0 % (ref 0–0.5)
LYMPHOCYTES # BLD: 2.6 K/UL (ref 0.8–3.5)
LYMPHOCYTES NFR BLD: 29 % (ref 12–49)
MAGNESIUM SERPL-MCNC: 1.1 MG/DL (ref 1.6–2.4)
MAGNESIUM SERPL-MCNC: 2.8 MG/DL (ref 1.6–2.4)
MCH RBC QN AUTO: 23.8 PG (ref 26–34)
MCHC RBC AUTO-ENTMCNC: 31.4 G/DL (ref 30–36.5)
MCV RBC AUTO: 75.7 FL (ref 80–99)
MONOCYTES # BLD: 1.3 K/UL (ref 0–1)
MONOCYTES NFR BLD: 15 % (ref 5–13)
NEUTS SEG # BLD: 4.7 K/UL (ref 1.8–8)
NEUTS SEG NFR BLD: 55 % (ref 32–75)
NRBC # BLD: 0 K/UL (ref 0–0.01)
NRBC BLD-RTO: 0 PER 100 WBC
PLATELET # BLD AUTO: 504 K/UL (ref 150–400)
PMV BLD AUTO: 9.5 FL (ref 8.9–12.9)
POTASSIUM SERPL-SCNC: 2.8 MMOL/L (ref 3.5–5.1)
POTASSIUM SERPL-SCNC: 3 MMOL/L (ref 3.5–5.1)
RBC # BLD AUTO: 3.62 M/UL (ref 3.8–5.2)
SERVICE CMNT-IMP: NORMAL
SODIUM SERPL-SCNC: 128 MMOL/L (ref 136–145)
SODIUM SERPL-SCNC: 131 MMOL/L (ref 136–145)
WBC # BLD AUTO: 8.8 K/UL (ref 3.6–11)

## 2022-11-11 PROCEDURE — 74011250636 HC RX REV CODE- 250/636

## 2022-11-11 PROCEDURE — 83735 ASSAY OF MAGNESIUM: CPT

## 2022-11-11 PROCEDURE — 74011250637 HC RX REV CODE- 250/637: Performed by: STUDENT IN AN ORGANIZED HEALTH CARE EDUCATION/TRAINING PROGRAM

## 2022-11-11 PROCEDURE — 80048 BASIC METABOLIC PNL TOTAL CA: CPT

## 2022-11-11 PROCEDURE — 74011250636 HC RX REV CODE- 250/636: Performed by: STUDENT IN AN ORGANIZED HEALTH CARE EDUCATION/TRAINING PROGRAM

## 2022-11-11 PROCEDURE — 85025 COMPLETE CBC W/AUTO DIFF WBC: CPT

## 2022-11-11 PROCEDURE — 99238 HOSP IP/OBS DSCHRG MGMT 30/<: CPT | Performed by: STUDENT IN AN ORGANIZED HEALTH CARE EDUCATION/TRAINING PROGRAM

## 2022-11-11 PROCEDURE — 74011250637 HC RX REV CODE- 250/637

## 2022-11-11 PROCEDURE — 36415 COLL VENOUS BLD VENIPUNCTURE: CPT

## 2022-11-11 PROCEDURE — 74011000250 HC RX REV CODE- 250

## 2022-11-11 RX ORDER — TAMSULOSIN HYDROCHLORIDE 0.4 MG/1
0.4 CAPSULE ORAL DAILY
Qty: 28 CAPSULE | Refills: 0 | Status: SHIPPED | OUTPATIENT
Start: 2022-11-11

## 2022-11-11 RX ORDER — POTASSIUM CHLORIDE 20 MEQ/1
20 TABLET, EXTENDED RELEASE ORAL DAILY
Qty: 30 TABLET | Refills: 0 | Status: SHIPPED | OUTPATIENT
Start: 2022-11-11

## 2022-11-11 RX ORDER — HYDROCHLOROTHIAZIDE 25 MG/1
25 TABLET ORAL DAILY
Qty: 90 TABLET | Refills: 1 | Status: SHIPPED | OUTPATIENT
Start: 2022-11-11

## 2022-11-11 RX ORDER — AMOXICILLIN 250 MG
1 CAPSULE ORAL DAILY
Qty: 20 TABLET | Refills: 0 | Status: SHIPPED | OUTPATIENT
Start: 2022-11-11

## 2022-11-11 RX ORDER — HYDROCODONE BITARTRATE AND ACETAMINOPHEN 5; 325 MG/1; MG/1
1 TABLET ORAL
Status: DISCONTINUED | OUTPATIENT
Start: 2022-11-11 | End: 2022-11-11 | Stop reason: HOSPADM

## 2022-11-11 RX ORDER — HYDROCHLOROTHIAZIDE 25 MG/1
25 TABLET ORAL DAILY
Status: DISCONTINUED | OUTPATIENT
Start: 2022-11-11 | End: 2022-11-11 | Stop reason: HOSPADM

## 2022-11-11 RX ORDER — POTASSIUM CHLORIDE 750 MG/1
20 TABLET, FILM COATED, EXTENDED RELEASE ORAL
Status: COMPLETED | OUTPATIENT
Start: 2022-11-11 | End: 2022-11-11

## 2022-11-11 RX ORDER — MAGNESIUM SULFATE HEPTAHYDRATE 40 MG/ML
2 INJECTION, SOLUTION INTRAVENOUS ONCE
Status: COMPLETED | OUTPATIENT
Start: 2022-11-11 | End: 2022-11-11

## 2022-11-11 RX ORDER — ONDANSETRON 4 MG/1
4 TABLET, ORALLY DISINTEGRATING ORAL
Qty: 20 TABLET | Refills: 0 | Status: SHIPPED | OUTPATIENT
Start: 2022-11-11

## 2022-11-11 RX ORDER — AMOXICILLIN 250 MG
1 CAPSULE ORAL DAILY
Status: COMPLETED | OUTPATIENT
Start: 2022-11-11 | End: 2022-11-11

## 2022-11-11 RX ORDER — HYDROCODONE BITARTRATE AND ACETAMINOPHEN 5; 325 MG/1; MG/1
1 TABLET ORAL
Qty: 10 TABLET | Refills: 0 | Status: SHIPPED | OUTPATIENT
Start: 2022-11-11 | End: 2022-11-14

## 2022-11-11 RX ADMIN — HYDROMORPHONE HYDROCHLORIDE 0.5 MG: 1 INJECTION, SOLUTION INTRAMUSCULAR; INTRAVENOUS; SUBCUTANEOUS at 11:03

## 2022-11-11 RX ADMIN — MAGNESIUM SULFATE HEPTAHYDRATE 2 G: 40 INJECTION, SOLUTION INTRAVENOUS at 10:13

## 2022-11-11 RX ADMIN — POTASSIUM CHLORIDE 20 MEQ: 750 TABLET, FILM COATED, EXTENDED RELEASE ORAL at 11:03

## 2022-11-11 RX ADMIN — SODIUM CHLORIDE, POTASSIUM CHLORIDE, SODIUM LACTATE AND CALCIUM CHLORIDE 250 ML/HR: 600; 310; 30; 20 INJECTION, SOLUTION INTRAVENOUS at 04:50

## 2022-11-11 RX ADMIN — HYDROMORPHONE HYDROCHLORIDE 0.5 MG: 1 INJECTION, SOLUTION INTRAMUSCULAR; INTRAVENOUS; SUBCUTANEOUS at 07:02

## 2022-11-11 RX ADMIN — POTASSIUM CHLORIDE 20 MEQ: 750 TABLET, FILM COATED, EXTENDED RELEASE ORAL at 08:54

## 2022-11-11 RX ADMIN — THERA TABS 1 TABLET: TAB at 08:54

## 2022-11-11 RX ADMIN — HYDROCODONE BITARTRATE AND ACETAMINOPHEN 1 TABLET: 5; 325 TABLET ORAL at 14:09

## 2022-11-11 RX ADMIN — SODIUM CHLORIDE, POTASSIUM CHLORIDE, SODIUM LACTATE AND CALCIUM CHLORIDE 250 ML/HR: 600; 310; 30; 20 INJECTION, SOLUTION INTRAVENOUS at 00:50

## 2022-11-11 RX ADMIN — RIVAROXABAN 20 MG: 10 TABLET, FILM COATED ORAL at 08:54

## 2022-11-11 RX ADMIN — SODIUM CHLORIDE, PRESERVATIVE FREE 10 ML: 5 INJECTION INTRAVENOUS at 07:02

## 2022-11-11 RX ADMIN — POLYETHYLENE GLYCOL 3350 17 G: 17 POWDER, FOR SOLUTION ORAL at 10:13

## 2022-11-11 RX ADMIN — ONDANSETRON 4 MG: 2 INJECTION INTRAMUSCULAR; INTRAVENOUS at 11:03

## 2022-11-11 RX ADMIN — POTASSIUM CHLORIDE 20 MEQ: 750 TABLET, FILM COATED, EXTENDED RELEASE ORAL at 12:55

## 2022-11-11 RX ADMIN — HYDROCHLOROTHIAZIDE 25 MG: 25 TABLET ORAL at 09:10

## 2022-11-11 RX ADMIN — HYDROMORPHONE HYDROCHLORIDE 0.5 MG: 1 INJECTION, SOLUTION INTRAMUSCULAR; INTRAVENOUS; SUBCUTANEOUS at 02:56

## 2022-11-11 RX ADMIN — Medication 100 MG: at 08:54

## 2022-11-11 RX ADMIN — POTASSIUM CHLORIDE 20 MEQ: 750 TABLET, FILM COATED, EXTENDED RELEASE ORAL at 07:02

## 2022-11-11 RX ADMIN — FOLIC ACID 1 MG: 1 TABLET ORAL at 08:54

## 2022-11-11 RX ADMIN — SENNOSIDES AND DOCUSATE SODIUM 1 TABLET: 50; 8.6 TABLET ORAL at 12:55

## 2022-11-11 RX ADMIN — SODIUM CHLORIDE, POTASSIUM CHLORIDE, SODIUM LACTATE AND CALCIUM CHLORIDE 250 ML/HR: 600; 310; 30; 20 INJECTION, SOLUTION INTRAVENOUS at 08:54

## 2022-11-11 NOTE — PROGRESS NOTES
Problem: Falls - Risk of  Goal: *Absence of Falls  Description: Document Sourav Hayes Fall Risk and appropriate interventions in the flowsheet. Outcome: Progressing Towards Goal  Note: Fall Risk Interventions:  Mobility Interventions: Assess mobility with egress test         Medication Interventions: Patient to call before getting OOB, Teach patient to arise slowly         History of Falls Interventions:  Investigate reason for fall, Room close to nurse's station         Problem: Patient Education: Go to Patient Education Activity  Goal: Patient/Family Education  Outcome: Progressing Towards Goal

## 2022-11-11 NOTE — PROGRESS NOTES
Nurse handed patient a copy of discharge instructions which have been read and explained to patient. New medications were read and explained to patient, patient verbalized understanding. Patient aware that prescriptions have been electronically sent to their pharmacy. Opportunity for questions and clarification offered. Removed patient's IV access with no complications. Vital signs stable. Patient sent with all belongings.

## 2022-11-11 NOTE — PROGRESS NOTES
Problem: Falls - Risk of  Goal: *Absence of Falls  Description: Document Nesha Gamma Fall Risk and appropriate interventions in the flowsheet. Outcome: Progressing Towards Goal  Note: Fall Risk Interventions:  Mobility Interventions: Assess mobility with egress test         Medication Interventions: Patient to call before getting OOB, Teach patient to arise slowly         History of Falls Interventions:  Investigate reason for fall, Room close to nurse's station         Problem: Patient Education: Go to Patient Education Activity  Goal: Patient/Family Education  Outcome: Progressing Towards Goal

## 2022-11-11 NOTE — PROGRESS NOTES
Bedside shift change report given to Kinza Welch rn (oncoming nurse) by Ian Vega (offgoing nurse). Report included the following information SBAR, Kardex, ED Summary, Procedure Summary, Intake/Output, and MAR.

## 2022-11-11 NOTE — PROGRESS NOTES
Spiritual Care Partner Volunteer visited patient at [address] in [facility] 4M POST SURG ORT 2 on 11/11/2022. Documented by:   Odette Rees.  Cayetano Raymundo, [address] [place] [address] paging Service [phone])

## 2022-11-11 NOTE — DISCHARGE SUMMARY
[address] [address]   Office [phone]  Fax [phone]       Discharge Note     Name: Adilson Junior MRN: [id number]  Sex: Female   YOB: 1985  Age: 30 y.o. PCP: Vik Bliss NP     Date of admission: 11/9/2022  Date of discharge: 11/11/2022    Attending physician at admission: [facility]. Attending physician at discharge: Tam Perez MD  Resident physician at discharge: Mary Gardner MD     Consultants during hospitalization  None     Admission diagnoses   Pancreatitis [K85.90]    Recommended follow-up after discharge    1. PCP-Francois Hua NP       Things to follow up on with PCP:   - Radiology recs triphasic CT pancreas in 4-6 weeks to reevaluate the tail. - Check Na and K. Given K to supplement with HCTZ. - Constipation  - Nephrolithiasis. Start on Flomax    Current Discharge Medication List        START taking these medications    Details   HYDROcodone-acetaminophen (NORCO) 5-325 mg per tablet Take 1 Tablet by mouth every six (6) hours as needed for Pain for up to 3 days. Max Daily Amount: 4 Tablets. Qty: 10 Tablet, Refills: 0    Associated Diagnoses: Nephrolithiasis      senna-docusate (Senna with Docusate Sodium) 8.6-50 mg per tablet Take 1 Tablet by mouth daily. Qty: 20 Tablet, Refills: 0      !! ondansetron (ZOFRAN ODT) 4 mg disintegrating tablet Take 1 Tablet by mouth every eight (8) hours as needed for Nausea or Vomiting. Qty: 20 Tablet, Refills: 0      tamsulosin (Flomax) 0.4 mg capsule Take 1 Capsule by mouth daily. Indications: stones in the urinary tract  Qty: 28 Capsule, Refills: 0    Comments: 0.4 mg once daily until stone passage or for up to 4 weeks      potassium chloride (K-DUR, KLOR-CON M20) 20 mEq tablet Take 1 Tablet by mouth daily. Qty: 30 Tablet, Refills: 0       !! - Potential duplicate medications found. Please discuss with provider.         CONTINUE these medications which have CHANGED    Details hydroCHLOROthiazide (HYDRODIURIL) 25 mg tablet Take 1 Tablet by mouth daily. Qty: 90 Tablet, Refills: 1    Associated Diagnoses: Primary hypertension           CONTINUE these medications which have NOT CHANGED    Details   rivaroxaban (Xarelto) 20 mg tab tablet Take 1 Tablet by mouth daily. Qty: 90 Tablet, Refills: 1    Associated Diagnoses: History of blood clots      multivitamin (ONE A DAY) tablet Take 1 Tablet by mouth daily. !! ondansetron (ZOFRAN ODT) 4 mg disintegrating tablet        !! - Potential duplicate medications found. Please discuss with provider. STOP taking these medications       HYDROmorphone (DILAUDID) 4 mg tablet Comments:   Reason for Stopping:         amitriptyline (ELAVIL) 50 mg tablet Comments:   Reason for Stopping:         baclofen (LIORESAL) 10 mg tablet Comments:   Reason for Stopping:                   History of Present Illness    As per admitting provider, Dr. Mouna Dao:     Patricia Velasquez is a 30 y.o. female with PMHx of pancreatitis, HTN,left foot drop, unprovoked DVT, critical illness neuropathy, obesity who presents to the ED complaining of abdominal pain. Patient reports 2-day history of upper epigastric abdominal pain and lower back pain scale 9/10. Associated with nausea and 2 episodes of vomiting nonbilious nonbloody stained. Reports chills with no fever. Reports decreased urine output, pink-tinged urine. 1 previous hospitalization for pancreatitis in [address] [place] [address] on 10/22, no cause identified. Endorses pain while passing urine, Denies diarrhea, abdominal distention,increased frequency, urgency, SOB, chest pain. Recent dietary changes include low fat diet  Reports >5 drinks several times in a month  No family h/o pancreatitis\"      Hospital course    Adilson Junior is a 30 y.o. female with a PMHx of pancreatitis, HTN,left foot drop, unprovoked DVT, critical illness neuropathy, obesity who is admitted for pancreatitis.      Pancreatitis, resolved: H/o hospitalization for pancreatitis in Oct 2022 in [facility]. Reports nausea,vomit,epigastric TTP on exam. Lipase 615. CT scan showed pancreatic tail pancreatitis given the clinical history. Likely 2/2 binge drinking. RUQ showed no stones, CTAP no GB thickening. Lipid panel wnl. UDS pos for opioids (appropriate, given in ED) and MJ. Pain improved with fluids and Dilaudid 0.5 mg q4 PRN. - Radiology rec triphasic CT pancreas in 4-6 weeks to reevaluate the tail. Nephrolithiasis, r/o UTI/pyelonephritis: UA bact 3+, leuk small, nitrites pos, blood mod, preg neg. Urine culture from original UA negative. Bilateral CVA tenderness. CT A/P with zachariah nephrolithiasis versus early excretion. No solid renal mass/ hydronephrosis. Not septic appearing. s/p CTX 1 g for 2 days to cover for possible pyelonephritis. D/c;d in s/o negative UCx.  - Cont HCTZ as can cause hypocalcuria. - Flomax 0.4 mg daily until passes stone, 4 weeks maximum.  - Pain management with short course of Norco, Tylenol    Binge drinking: w concern for alcohol use disorder. Last drink 3 days ago. Reports>5 drinks once every 3 weeks. Patient feels need to cut down alcohol and guilty of usage. Not used as eye opener,  or annoyed (CAGE). Contemplative stage of quitting alcohol. CIWA 0 day after admission, no hx of withdrawals. - Continue alcohol cessation and further work-up outpatient    Constipation: worsened in s/o opioid use for pain management. Given miralax, simethicone PRN. Pain reports similar episode of constipation after last hospitalization.  - Start senna-docusate   - Patient is confident with other OTC medicines she has at home, which she used after last hospitalization. Hypercalcemia, resolved: Likely 2/2 to HCTZ. poa Ca 10. 5. corrected Ca 11.1,with elevated alk phosph. Appropriate in s/o HCTZ. Hepatic Steatosis: seen on CTAP on 11/9.  - Follow up outpatient. Lifestyle changes recommended. HTN: POA BP on admission 120/73.  At this time, 109/71.  - Continue home HCTZ 25 mg once daily     H/o unprovoked DVT: In upper extremity (2019)  - Continue home Xarelto 20 mg once daily    Left Foot drop: Chronic 3 years  - Monitor OP    Critical illness neuropathy: Previously on amitriptyline 50 mg once daily. Follows Dr Tanya Marsh MD (Neurology). - Monitor OP    Obesity: The pt's Body mass index is 30.11 kg/m². - Encouraging lifestyle modifications and further follow up outpatient. Visit Vitals  /81 (BP 1 Location: Right upper arm, BP Patient Position: At rest)   Pulse 79   Temp 98.5 °F (36.9 °C)   Resp 18   Ht 5' 3\" (1.6 m)   Wt 170 lb (77.1 kg)   SpO2 91%   BMI 30.11 kg/m²       Physical Examination:  General: No acute distress  Head: Normocephalic, atraumatic  Eyes: Conjunctiva pink  Neck: Supple  ENT: No rhinorrhea  CV: Heart: regular rate and rhythm, no mrg. Resp: Lungs: clear to auscultation bilaterally  GI: epigastrium non-tender. Mild tenderness in BL lower quadrants. + bowel sounds, non-distended. Back: Mild CVA tenderness. Extremities: No lower extremity edema  Skin: Warm, dry  Neuro: Awake, alert, and oriented. Condition at discharge: Stable. Labs  Recent Labs     11/11/22  0345 11/10/22  1248 11/09/22  1841   WBC 8.8 9.6 15.8*   HGB 8.6* 8.7* 10.6*   HCT 27.4* 28.3* 33.7*   * 516* 636*     Recent Labs     11/11/22  1220 11/11/22  0345 11/10/22  1248   * 131* 137   K 3.0* 2.8* 3.2*   CL 91* 95* 105   CO2 27 27 25   BUN 2* 3* 6   CREA 0.39* 0.42* 0.57   * 84 114*   CA 8.6 8.4* 8.9   MG 2.8* 1.1*  --      Recent Labs     11/09/22  1841   ALT 30   *   TBILI 0.9   TP 8.2   ALB 3.3*   GLOB 4.9*   LPSE 615*     No results for input(s): PH, PCO2, PO2, TNIPOC, TROIQ, INR, PTP, APTT, FE, TIBC, PSAT, FERR, GLUCPOC, INREXT, INREXT in the last 72 hours.     No lab exists for component: GLPOC    Micro:  Lab Results   Component Value Date/Time    Culture result: No growth (<1,000 CFU/ML) 11/10/2022 04:33 AM    Culture result: MIXED UROGENITAL LOVELY ISOLATED 08/09/2022 03:44 PM       Imaging:  CT ABD PELV W CONT    Result Date: 11/9/2022  EXAM: CT ABD PELV W CONT INDICATION: Upper abdominal pain and back pain. Previous pancreatitis. Leukocytosis and elevated alkaline phosphatase. Hematuria and elevated lipase. COMPARISON: None. CONTRAST: 100 mL of Isovue-370. ORAL CONTRAST: None TECHNIQUE: Following the uneventful intravenous administration of contrast, thin axial images were obtained through the abdomen and pelvis. Coronal and sagittal reconstructions were generated. CT dose reduction was achieved through use of a standardized protocol tailored for this examination and automatic exposure control for dose modulation. FINDINGS: LOWER THORAX: No significant abnormality in the incidentally imaged lower chest. LIVER: Hypoattenuation may represent hepatic steatosis. No mass. Smooth surface. BILIARY TREE: Gallbladder is within normal limits. CBD is not dilated. SPLEEN: Small, autoinfarcted splenic tissue. PANCREAS: Enlarged pancreatic tail with surrounding mild inflammation. Mild atrophy. Small surrounding lymph nodes near the head. ADRENALS: Unremarkable. KIDNEYS: Bilateral nephrolithiasis versus early excretion. No solid renal mass or hydronephrosis. STOMACH: Unremarkable. SMALL BOWEL: No dilatation or wall thickening. COLON: No dilatation or wall thickening. APPENDIX: Appendectomy. PERITONEUM: No ascites or pneumoperitoneum. RETROPERITONEUM: No lymphadenopathy or aortic aneurysm. REPRODUCTIVE ORGANS: Left posterior uterine fibroid measures 2.5 cm. Heterogeneous endocervical canal. URINARY BLADDER: Incomplete distention, limited evaluation. BONES: No destructive bone lesion. ABDOMINAL WALL: No mass or hernia. ADDITIONAL COMMENTS: Mild muscle atrophy. 1. Pancreatic tail pancreatitis given the clinical history. Pancreatic neoplasm is less likely in this age group and clinical scenario. 2. Possible hepatic steatosis.  3. Heterogeneous endocervical canal. Evaluate with physical exam. Recommendation: Triphasic CT pancreas in 4-6 weeks to reevaluate the tail. US ABD LTD    Result Date: 11/10/2022  INDICATION: RUQ pain and tenderness COMPARISON: None TECHNIQUE: Routine ultrasound images of the abdomen were obtained. FINDINGS: LIVER: No significant enlargement or evidence of parenchymal lesion. MAIN PORTAL VEIN: Patent with appropriate hepatopetal flow direction. GALLBLADDER: No gallstones, gallbladder wall thickening, or pericholecystic fluid. COMMON BILE DUCT: 3.7 mm in diameter. No significant dilatation. PANCREAS: Not well visualized due to bowel gas though visualized portions are unremarkable. RIGHT KIDNEY: 10.8 cm in length. No hydronephrosis or nephrolithiasis. OTHER: N/A. No significant abnormality. Chronic diagnoses   Problem List as of 11/11/2022 Date Reviewed: 8/9/2022            Codes Class Noted - Resolved    Left foot drop ICD-10-CM: M21.372  ICD-9-CM: 736.79  11/10/2022 - Present        [facility] ICD-10-CM: G62.81  ICD-9-CM: 357.82  11/10/2022 - Present        Hypertension ICD-10-CM: I10  ICD-9-CM: 401.9  11/10/2022 - Present        Acute cystitis ICD-10-CM: N30.00  ICD-9-CM: 595.0  11/10/2022 - Present        Obesity ICD-10-CM: E66.9  ICD-9-CM: 278.00  11/10/2022 - Present        * (Principal) Pancreatitis ICD-10-CM: K85.90  ICD-9-CM: 348.9  11/9/2022 - Present        Spleen absent ICD-10-CM: Q89.01  ICD-9-CM: 759.0  10/12/2021 - Present        History of blood clots ICD-10-CM: U31.001  ICD-9-CM: V12.51  10/12/2021 - Present        History of appendectomy ICD-10-CM: Z90.49  ICD-9-CM: V45.89  10/12/2021 - Present           Current Discharge Medication List        START taking these medications    Details   HYDROcodone-acetaminophen (NORCO) 5-325 mg per tablet Take 1 Tablet by mouth every six (6) hours as needed for Pain for up to 3 days. Max Daily Amount: 4 Tablets.   Qty: 10 Tablet, Refills: 0  Start date: 11/11/2022, End date: 11/14/2022    Associated Diagnoses: Nephrolithiasis      senna-docusate (Senna with Docusate Sodium) 8.6-50 mg per tablet Take 1 Tablet by mouth daily. Qty: 20 Tablet, Refills: 0  Start date: 11/11/2022      !! ondansetron (ZOFRAN ODT) 4 mg disintegrating tablet Take 1 Tablet by mouth every eight (8) hours as needed for Nausea or Vomiting. Qty: 20 Tablet, Refills: 0  Start date: 11/11/2022      tamsulosin (Flomax) 0.4 mg capsule Take 1 Capsule by mouth daily. Indications: stones in the urinary tract  Qty: 28 Capsule, Refills: 0  Start date: 11/11/2022    Comments: 0.4 mg once daily until stone passage or for up to 4 weeks      potassium chloride (K-DUR, KLOR-CON M20) 20 mEq tablet Take 1 Tablet by mouth daily. Qty: 30 Tablet, Refills: 0  Start date: 11/11/2022       !! - Potential duplicate medications found. Please discuss with provider. CONTINUE these medications which have CHANGED    Details   hydroCHLOROthiazide (HYDRODIURIL) 25 mg tablet Take 1 Tablet by mouth daily. Qty: 90 Tablet, Refills: 1  Start date: 11/11/2022    Associated Diagnoses: Primary hypertension           CONTINUE these medications which have NOT CHANGED    Details   rivaroxaban (Xarelto) 20 mg tab tablet Take 1 Tablet by mouth daily. Qty: 90 Tablet, Refills: 1    Associated Diagnoses: History of blood clots      multivitamin (ONE A DAY) tablet Take 1 Tablet by mouth daily. !! ondansetron (ZOFRAN ODT) 4 mg disintegrating tablet        !! - Potential duplicate medications found. Please discuss with provider. STOP taking these medications       HYDROmorphone (DILAUDID) 4 mg tablet Comments:   Reason for Stopping:         amitriptyline (ELAVIL) 50 mg tablet Comments:   Reason for Stopping:         baclofen (LIORESAL) 10 mg tablet Comments:   Reason for Stopping:                Diet:  Regular diet.     Activity:  As tolerated     Disposition:  Home    Discharge instructions to patient/family  Please seek medical attention for any new or worsening symptoms particularly fever, chest pain, shortness of breath, abdominal pain, nausea, vomiting    Follow up plans/appointments  Follow-up Information       Follow up With Specialties Details Why Contact Chayito Burgess NP Nurse Practitioner Follow up on 11/17/2022 1 PM for hospital follow up. [address] [facility] [place][note 2]  [address]  Schedule an appointment as soon as possible for a visit Schedule follow up with urology for kidney stones [address] [facility]             Tierney Franklin MD  Family Medicine Resident       For Billing    Chief Complaint   Patient presents with    Abdominal Pain    Back Pain       Hospital Problems  Date Reviewed: 8/9/2022            Codes Class Noted POA    Left foot drop ICD-10-CM: M21.372  ICD-9-CM: 736.79  11/10/2022 Unknown        Critical illness neuropathy ([facility] ICD-10-CM: G62.81  ICD-9-CM: 357.82  11/10/2022 Unknown        Hypertension ICD-10-CM: I10  ICD-9-CM: 401.9  11/10/2022 Unknown        Acute cystitis ICD-10-CM: N30.00  ICD-9-CM: 595.0  11/10/2022 Unknown        Obesity ICD-10-CM: E66.9  ICD-9-CM: 278.00  11/10/2022 Unknown        * (Principal) Pancreatitis ICD-10-CM: K85.90  ICD-9-CM: 855.4  11/9/2022 Unknown        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        History of blood clots ICD-10-CM: O36.863  ICD-9-CM: V12.51  10/12/2021 Yes

## 2022-11-11 NOTE — DISCHARGE INSTRUCTIONS
HOME DISCHARGE INSTRUCTIONS    Jose Kumari / [id number] : 1985    Admission date: 2022 Discharge date: 2022 11:52 AM     Please bring this form with you to show your care provider at your follow-up appointment. Primary care provider:  Kenneth Wooten    Discharging provider:  Jocy Dwyer MD  - Family Medicine Resident  [address] [place] [address],[address] Attending      You have been admitted to the hospital with the following diagnoses:    ACUTE DIAGNOSES:  Pancreatitis [K85.90]      FOLLOW-UP CARE RECOMMENDATIONS:  - Try to follow a low fat, low cholesterol diet to avoid future episodes of pancreatitis. Additionally try to reduce your alcohol intake as much as possible. Appointments    See appointment above       Follow-up tests needed:   - Talk to your doctor about repeating your calcium level, this was elevated while you were in the hospital and has been high previously. This may be related to your blood pressure medication hydrochlorothiazide. - Schedule an appointment to follow up with a urologist to discuss the kidney stones seen on your imaging. This could be the cause of some of your abdominal pain and the blood in your urine. Pending test results: At the time of your discharge the following test results are still pending: none. Please make sure you review these results with your outpatient follow-up provider(s). DIET/what to eat:  Low fat, Low cholesterol. Try to reduce alcohol intake as much as possible. ACTIVITY:  Activity as tolerated    Wound care: none    Equipment needed:  none    Specific symptoms to watch for: chest pain, shortness of breath, fever, chills, nausea, vomiting, diarrhea, change in mentation, falling, weakness, bleeding. What to do if new or unexpected symptoms occur? If you experience any of the above symptoms (or should other concerns or questions arise after discharge) please call your primary care physician.  Return to the emergency room if you cannot get hold of your doctor. It is very important that you keep your follow-up appointment(s). Please bring discharge papers, medication list (and/or medication bottles) to your follow-up appointments for review by your outpatient provider(s). Please check the list of medications and be sure it includes every medication (even non-prescription medications) that your provider wants you to take. It is important that you take the medication exactly as they are prescribed. Keep your medication in the bottles provided by the pharmacist and keep a list of the medication names, dosages, and times to be taken in your wallet. Do not take other medications without consulting your doctor. If you have any questions about your medications or other instructions, please talk to your nurse or care provider before you leave the hospital.     Information obtained by:     I understand that if any problems occur once I am at home I am to contact my physician. These instructions were explained to me and I had the opportunity to ask questions. I understand and acknowledge receipt of the instructions indicated above.                                                                                                                                                Physician's or R.N.'s Signature                                                                  Date/Time                                                                                                                                              Patient or Representative Signature                                                          Date/Time

## 2022-11-17 ENCOUNTER — OFFICE VISIT (OUTPATIENT)
Dept: FAMILY MEDICINE CLINIC | Age: 37
End: 2022-11-17
Payer: OTHER GOVERNMENT

## 2022-11-17 VITALS
DIASTOLIC BLOOD PRESSURE: 79 MMHG | SYSTOLIC BLOOD PRESSURE: 112 MMHG | OXYGEN SATURATION: 98 % | WEIGHT: 159 LBS | BODY MASS INDEX: 28.17 KG/M2 | HEART RATE: 118 BPM | TEMPERATURE: 97.6 F | RESPIRATION RATE: 16 BRPM | HEIGHT: 63 IN

## 2022-11-17 DIAGNOSIS — K85.90 ACUTE PANCREATITIS, UNSPECIFIED COMPLICATION STATUS, UNSPECIFIED PANCREATITIS TYPE: ICD-10-CM

## 2022-11-17 DIAGNOSIS — Z09 HOSPITAL DISCHARGE FOLLOW-UP: Primary | ICD-10-CM

## 2022-11-17 DIAGNOSIS — K76.0 HEPATIC STEATOSIS: ICD-10-CM

## 2022-11-17 DIAGNOSIS — K59.00 CONSTIPATION, UNSPECIFIED CONSTIPATION TYPE: ICD-10-CM

## 2022-11-17 DIAGNOSIS — R74.8 ELEVATED ALKALINE PHOSPHATASE LEVEL: ICD-10-CM

## 2022-11-17 DIAGNOSIS — N20.0 KIDNEY STONES: ICD-10-CM

## 2022-11-17 DIAGNOSIS — E83.41 HYPERMAGNESEMIA: ICD-10-CM

## 2022-11-17 DIAGNOSIS — Z86.718 HISTORY OF BLOOD CLOTS: ICD-10-CM

## 2022-11-17 PROCEDURE — 99214 OFFICE O/P EST MOD 30 MIN: CPT | Performed by: NURSE PRACTITIONER

## 2022-11-17 PROCEDURE — 3078F DIAST BP <80 MM HG: CPT | Performed by: NURSE PRACTITIONER

## 2022-11-17 PROCEDURE — 3074F SYST BP LT 130 MM HG: CPT | Performed by: NURSE PRACTITIONER

## 2022-11-17 PROCEDURE — 1111F DSCHRG MED/CURRENT MED MERGE: CPT | Performed by: NURSE PRACTITIONER

## 2022-11-17 NOTE — PROGRESS NOTES
Transitional Care Management Progress Note    Patient: Mati Prasad  : 1985  PCP: Sona Arthur NP    Date of office visit: 2022   Date of admission: 22  Date of discharge: 22  Hospital: [facility] [address]    Call initiated w/i 2 business dates of discharge: *No response documented in the last 14 days   Date of the most recent call to the patient: *No documented post hospital discharge outreach found in the last 14 days      Assessment/Plan:   Diagnoses and all orders for this visit:    1. Hospital discharge follow-up  -     NV DISCHARGE MEDS RECONCILED W/ CURRENT OUTPATIENT MED LIST  - Improving, discussed no more alcohol use. Follow up CT ordered. Ref to urology for ongoing kidney stones. 2. Constipation, unspecified constipation type   -stable, advised to drink warm liquids, continue stool softner, drink plenty of water, may try suppository or enema if no improvement. Reasons to seek urgent care discussed. Last bowel movement was this morning    3. Acute pancreatitis, unspecified complication status, unspecified pancreatitis type  -     CT ABD W WO CONT; Future  - Improving, follow up CT ordered. 4. Kidney stones  -     REFERRAL TO UROLOGY  - Unchanged, ref to urology for further evaluation and treatment. Reasons to seek urgent care discussed. 5. Hepatic steatosis   -work on diet and exercise for weight loss. Decrease carbohydrate and sugar intake and increase activity for targeted exercise. Subjective:   Mait Prasad is a 30 y.o. female presenting today for follow-up after hospital discharge. This encounter and supporting documentation was reviewed if available. Medication reconciliation was performed today. The main problem requiring admission was pancreatitis.    Complications during admission: none    Hospital course     Mati Prasad is a 30 y.o. female with a PMHx of pancreatitis, HTN,left foot drop, unprovoked DVT, critical illness neuropathy, obesity who is admitted for pancreatitis. Pancreatitis, resolved: H/o hospitalization for pancreatitis in Oct 2022 in [facility]. Reports nausea,vomit,epigastric TTP on exam. Lipase 615. CT scan showed pancreatic tail pancreatitis given the clinical history. Likely 2/2 binge drinking. RUQ showed no stones, CTAP no GB thickening. Lipid panel wnl. UDS pos for opioids (appropriate, given in ED) and MJ. Pain improved with fluids and Dilaudid 0.5 mg q4 PRN. - Radiology rec triphasic CT pancreas in 4-6 weeks to reevaluate the tail. Nephrolithiasis, r/o UTI/pyelonephritis: UA bact 3+, leuk small, nitrites pos, blood mod, preg neg. Urine culture from original UA negative. Bilateral CVA tenderness. CT A/P with zachariah nephrolithiasis versus early excretion. No solid renal mass/ hydronephrosis. Not septic appearing. s/p CTX 1 g for 2 days to cover for possible pyelonephritis. D/c;d in s/o negative UCx.  - Cont HCTZ as can cause hypocalcuria. - Flomax 0.4 mg daily until passes stone, 4 weeks maximum.  - Pain management with short course of Norco, Tylenol     Binge drinking: w concern for alcohol use disorder. Last drink 3 days ago. Reports>5 drinks once every 3 weeks. Patient feels need to cut down alcohol and guilty of usage. Not used as eye opener,  or annoyed (CAGE). Contemplative stage of quitting alcohol. CIWA 0 day after admission, no hx of withdrawals. - Continue alcohol cessation and further work-up outpatient     Constipation: worsened in s/o opioid use for pain management. Given miralax, simethicone PRN. Pain reports similar episode of constipation after last hospitalization.  - Start senna-docusate   - Patient is confident with other OTC medicines she has at home, which she used after last hospitalization. Hypercalcemia, resolved: Likely 2/2 to HCTZ. poa Ca 10. 5. corrected Ca 11.1,with elevated alk phosph. Appropriate in s/o HCTZ. Hepatic Steatosis: seen on CTAP on 11/9.  - Follow up outpatient.  Lifestyle changes recommended. HTN: POA BP on admission 120/73. At this time, 109/71.  - Continue home HCTZ 25 mg once daily      H/o unprovoked DVT: In upper extremity (2019)  - Continue home Xarelto 20 mg once daily     Left Foot drop: Chronic 3 years  - Monitor OP     Critical illness neuropathy: Previously on amitriptyline 50 mg once daily. Follows Dr Author Gabriela MD (Neurology). - Monitor OP     Obesity: The pt's Body mass index is 30.11 kg/m². - Encouraging lifestyle modifications and further follow up outpatient. Interval history/Current status: stable    Admitting symptoms have: improved      Medications marked \"taking\" at this time:  Prior to Admission medications    Medication Sig Start Date End Date Taking? Authorizing Provider   hydroCHLOROthiazide (HYDRODIURIL) 25 mg tablet Take 1 Tablet by mouth daily. 11/11/22  Yes Foster Lozada MD   senna-docusate (Senna with Docusate Sodium) 8.6-50 mg per tablet Take 1 Tablet by mouth daily. 11/11/22  Yes Andrew Honeycutt MD   ondansetron (ZOFRAN ODT) 4 mg disintegrating tablet Take 1 Tablet by mouth every eight (8) hours as needed for Nausea or Vomiting. 11/11/22  Yes Andrew Honeycutt MD   tamsulosin (Flomax) 0.4 mg capsule Take 1 Capsule by mouth daily. Indications: stones in the urinary tract 11/11/22  Yes Andrew Honeycutt MD   potassium chloride (K-DUR, KLOR-CON M20) 20 mEq tablet Take 1 Tablet by mouth daily. 11/11/22  Yes Andrew Honeycutt MD   ondansetron (ZOFRAN ODT) 4 mg disintegrating tablet  8/31/22  Yes Provider, Historical   rivaroxaban (Xarelto) 20 mg tab tablet Take 1 Tablet by mouth daily. 6/27/22  Yes Barby Hua, NP   multivitamin (ONE A DAY) tablet Take 1 Tablet by mouth daily. Yes Provider, Historical        ROS:  Review of Systems   Constitutional:  Negative for chills, fever and malaise/fatigue. Eyes:  Negative for blurred vision. Respiratory:  Negative for cough and shortness of breath.     Cardiovascular:  Negative for chest pain, palpitations and leg swelling. Gastrointestinal:  Positive for abdominal pain and constipation. Negative for blood in stool, diarrhea, heartburn, melena, nausea and vomiting. Neurological:  Negative for dizziness and headaches. 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 Appearance: She is not diaphoretic. HENT:      Head: Normocephalic and atraumatic. Cardiovascular:      Rate and Rhythm: Normal rate and regular rhythm. Heart sounds: Normal heart sounds. No murmur heard. No friction rub. No gallop. Pulmonary:      Effort: Pulmonary effort is normal. No respiratory distress. Breath sounds: Normal breath sounds. No wheezing or rales. Skin:     General: Skin is warm and dry. Coloration: Skin is not pale. Neurological:      Mental Status: She is alert. We discussed the expected course, resolution and complications of the diagnosis(es) in detail. Medication risks, benefits, costs, interactions, and alternatives were discussed as indicated. I advised her to contact the office if her condition worsens, changes or fails to improve as anticipated. She expressed understanding with the diagnosis(es) and plan.      Price Guo NP

## 2022-11-17 NOTE — PROGRESS NOTES
1. \"Have you been to the ER, urgent care clinic since your last visit? Hospitalized since your last visit? \" Yes When: 11/9/2022 Where: [facility] Reason for visit: Pracreastis    2. \"Have you seen or consulted any other health care providers outside of the [address] [place] [address] since your last visit? \" No     3. For patients aged 39-70: Has the patient had a colonoscopy / FIT/ Cologuard? NA - based on age      If the patient is female:    4. For patients aged 41-77: Has the patient had a mammogram within the past 2 years? NA - based on age or sex      11. For patients aged 21-65: Has the patient had a pap smear? NA - based on age or sex    Chief Complaint   Patient presents with    Hospital Follow Up     3 most recent [facility] 36 Screens 11/17/2022   Little interest or pleasure in doing things Not at all   Feeling down, depressed, irritable, or hopeless Not at all   Total Score PHQ 2 0     Abuse Screening Questionnaire 11/17/2022   Do you ever feel afraid of your partner? N   Are you in a relationship with someone who physically or mentally threatens you? N   Is it safe for you to go home?  Y     Visit Vitals  /79 (BP 1 Location: Left upper arm, BP Patient Position: Sitting, BP Cuff Size: Small adult)   Pulse (!) 118   Temp 97.6 °F (36.4 °C) (Skin)   Resp 16   Ht 5' 3\" (1.6 m)   Wt 159 lb (72.1 kg)   SpO2 98%   BMI 28.17 kg/m²     Health Maintenance Due   Topic Date Due    Hepatitis C Screening  Never done    Cervical cancer screen  Never done    MenB Meningococcal topic (2 of 4 - Increased Risk Bexsero 2-dose series) 10/28/2020    COVID-19 Vaccine (4 - Booster for Moderna series) 04/12/2022

## 2022-11-18 LAB
ALBUMIN SERPL-MCNC: 3.8 G/DL (ref 3.5–5)
ALBUMIN/GLOB SERPL: 0.9 {RATIO} (ref 1.1–2.2)
ALP SERPL-CCNC: 131 U/L (ref 45–117)
ALT SERPL-CCNC: 19 U/L (ref 12–78)
ANION GAP SERPL CALC-SCNC: 11 MMOL/L (ref 5–15)
AST SERPL-CCNC: 24 U/L (ref 15–37)
BILIRUB SERPL-MCNC: 0.7 MG/DL (ref 0.2–1)
BUN SERPL-MCNC: 13 MG/DL (ref 6–20)
BUN/CREAT SERPL: 16 (ref 12–20)
CALCIUM SERPL-MCNC: 9.9 MG/DL (ref 8.5–10.1)
CHLORIDE SERPL-SCNC: 95 MMOL/L (ref 97–108)
CO2 SERPL-SCNC: 24 MMOL/L (ref 21–32)
CREAT SERPL-MCNC: 0.81 MG/DL (ref 0.55–1.02)
GLOBULIN SER CALC-MCNC: 4.3 G/DL (ref 2–4)
GLUCOSE SERPL-MCNC: 131 MG/DL (ref 65–100)
MAGNESIUM SERPL-MCNC: 1.6 MG/DL (ref 1.6–2.4)
POTASSIUM SERPL-SCNC: 3.7 MMOL/L (ref 3.5–5.1)
PROT SERPL-MCNC: 8.1 G/DL (ref 6.4–8.2)
SODIUM SERPL-SCNC: 130 MMOL/L (ref 136–145)

## 2022-11-29 DIAGNOSIS — E87.1 HYPONATREMIA: Primary | ICD-10-CM

## 2022-11-29 NOTE — PROGRESS NOTES
Please let patient know her sodium level was low and to add salt to her food. I want a repeat the labs in a week that lab slip is in.   She is also getting a letter for her lab results

## 2022-12-09 ENCOUNTER — HOSPITAL ENCOUNTER (OUTPATIENT)
Dept: CT IMAGING | Age: 37
End: 2022-12-09
Attending: NURSE PRACTITIONER
Payer: OTHER GOVERNMENT

## 2022-12-09 DIAGNOSIS — K85.90 ACUTE PANCREATITIS, UNSPECIFIED COMPLICATION STATUS, UNSPECIFIED PANCREATITIS TYPE: ICD-10-CM

## 2022-12-09 PROCEDURE — 74170 CT ABD WO CNTRST FLWD CNTRST: CPT

## 2022-12-09 PROCEDURE — 74011000636 HC RX REV CODE- 636: Performed by: NURSE PRACTITIONER

## 2022-12-09 RX ADMIN — IOPAMIDOL 100 ML: 755 INJECTION, SOLUTION INTRAVENOUS at 10:28

## 2022-12-21 ENCOUNTER — OFFICE VISIT (OUTPATIENT)
Dept: FAMILY MEDICINE CLINIC | Age: 37
End: 2022-12-21
Payer: OTHER GOVERNMENT

## 2022-12-21 VITALS
WEIGHT: 164.4 LBS | TEMPERATURE: 97.7 F | RESPIRATION RATE: 20 BRPM | SYSTOLIC BLOOD PRESSURE: 108 MMHG | HEART RATE: 85 BPM | DIASTOLIC BLOOD PRESSURE: 72 MMHG | OXYGEN SATURATION: 100 % | BODY MASS INDEX: 29.13 KG/M2 | HEIGHT: 63 IN

## 2022-12-21 DIAGNOSIS — D64.9 ANEMIA, UNSPECIFIED TYPE: ICD-10-CM

## 2022-12-21 DIAGNOSIS — Q89.01 SPLEEN ABSENT: ICD-10-CM

## 2022-12-21 DIAGNOSIS — K85.90 ACUTE PANCREATITIS, UNSPECIFIED COMPLICATION STATUS, UNSPECIFIED PANCREATITIS TYPE: Primary | ICD-10-CM

## 2022-12-21 LAB
ANION GAP SERPL CALC-SCNC: 6 MMOL/L (ref 5–15)
BASOPHILS # BLD: 0.1 K/UL (ref 0–0.1)
BASOPHILS NFR BLD: 1 % (ref 0–1)
BUN SERPL-MCNC: 10 MG/DL (ref 6–20)
BUN/CREAT SERPL: 15 (ref 12–20)
CALCIUM SERPL-MCNC: 10 MG/DL (ref 8.5–10.1)
CHLORIDE SERPL-SCNC: 104 MMOL/L (ref 97–108)
CO2 SERPL-SCNC: 25 MMOL/L (ref 21–32)
CREAT SERPL-MCNC: 0.68 MG/DL (ref 0.55–1.02)
DIFFERENTIAL METHOD BLD: ABNORMAL
EOSINOPHIL # BLD: 0 K/UL (ref 0–0.4)
EOSINOPHIL NFR BLD: 0 % (ref 0–7)
ERYTHROCYTE [DISTWIDTH] IN BLOOD BY AUTOMATED COUNT: 19.2 % (ref 11.5–14.5)
GLUCOSE SERPL-MCNC: 106 MG/DL (ref 65–100)
HCT VFR BLD AUTO: 32.6 % (ref 35–47)
HGB BLD-MCNC: 9.4 G/DL (ref 11.5–16)
IMM GRANULOCYTES # BLD AUTO: 0 K/UL (ref 0–0.04)
IMM GRANULOCYTES NFR BLD AUTO: 0 % (ref 0–0.5)
LIPASE SERPL-CCNC: 48 U/L (ref 73–393)
LYMPHOCYTES # BLD: 2.6 K/UL (ref 0.8–3.5)
LYMPHOCYTES NFR BLD: 25 % (ref 12–49)
MCH RBC QN AUTO: 23.9 PG (ref 26–34)
MCHC RBC AUTO-ENTMCNC: 28.8 G/DL (ref 30–36.5)
MCV RBC AUTO: 83 FL (ref 80–99)
MONOCYTES # BLD: 1.3 K/UL (ref 0–1)
MONOCYTES NFR BLD: 13 % (ref 5–13)
NEUTS SEG # BLD: 6.2 K/UL (ref 1.8–8)
NEUTS SEG NFR BLD: 61 % (ref 32–75)
NRBC # BLD: 0 K/UL (ref 0–0.01)
NRBC BLD-RTO: 0 PER 100 WBC
PLATELET # BLD AUTO: 700 K/UL (ref 150–400)
PLATELET COMMENTS,PCOM: ABNORMAL
PMV BLD AUTO: 10.1 FL (ref 8.9–12.9)
POTASSIUM SERPL-SCNC: 4.2 MMOL/L (ref 3.5–5.1)
RBC # BLD AUTO: 3.93 M/UL (ref 3.8–5.2)
RBC MORPH BLD: ABNORMAL
SODIUM SERPL-SCNC: 135 MMOL/L (ref 136–145)
WBC # BLD AUTO: 10.2 K/UL (ref 3.6–11)

## 2022-12-21 PROCEDURE — 3074F SYST BP LT 130 MM HG: CPT | Performed by: NURSE PRACTITIONER

## 2022-12-21 PROCEDURE — 3078F DIAST BP <80 MM HG: CPT | Performed by: NURSE PRACTITIONER

## 2022-12-21 PROCEDURE — 99214 OFFICE O/P EST MOD 30 MIN: CPT | Performed by: NURSE PRACTITIONER

## 2022-12-21 NOTE — PROGRESS NOTES
Assessment/Plan:     Diagnoses and all orders for this visit:    1. Acute pancreatitis, unspecified complication status, unspecified pancreatitis type  -     LIPASE; Future  -CT scan reviewed, patient has follow up with GI in the next couple of weeks. Will repeat Lipase level today     2. Anemia, unspecified type  -     CBC WITH AUTOMATED DIFF; Future  -     METABOLIC PANEL, BASIC; Future  - Presumed stable, repeat of labs today, follow up based on results    3. Spleen absent      Discussed expected course/resolution/complications of diagnosis in detail with patient. Medication risks/benefits/costs/interactions/alternatives discussed with patient. Pt was given after visit summary which includes diagnoses, current medications & vitals. Pt expressed understanding with the diagnosis and plan        Subjective:      Nathaniel Tompkins is a 30 y.o. female who presents for had concerns including Results (CT scan results). Here today for follow up on CT scan  CT showed persistent dilation of the pancreatic duct  Seeing GI and next month  Denies pain in abd and diarrhea  Denies change in color of bowel movements    Complains of constipation  Doing miralax   Has done suppository and enema  Last bowel movement yesterday     Weak and tired  The last few weeks  Denies fever  Taking iron and multivitamin  Based on labs hgb was 8.6    Current Outpatient Medications   Medication Sig Dispense Refill    hydroCHLOROthiazide (HYDRODIURIL) 25 mg tablet Take 1 Tablet by mouth daily. 90 Tablet 1    senna-docusate (Senna with Docusate Sodium) 8.6-50 mg per tablet Take 1 Tablet by mouth daily. 20 Tablet 0    ondansetron (ZOFRAN ODT) 4 mg disintegrating tablet Take 1 Tablet by mouth every eight (8) hours as needed for Nausea or Vomiting. 20 Tablet 0    tamsulosin (Flomax) 0.4 mg capsule Take 1 Capsule by mouth daily.  Indications: stones in the urinary tract 28 Capsule 0    potassium chloride (K-DUR, KLOR-CON M20) 20 mEq tablet Take 1 Tablet by mouth daily. 30 Tablet 0    ondansetron (ZOFRAN ODT) 4 mg disintegrating tablet       multivitamin (ONE A DAY) tablet Take 1 Tablet by mouth daily. Allergies   Allergen Reactions    Other Medication Hives     Sulfa drugs    Sulfa (Sulfonamide Antibiotics) Hives     Past Medical History:   Diagnosis Date    Pancreatitis 10/20/2022     History reviewed. No pertinent surgical history. Family History   Problem Relation Age of Onset    Ovarian Cancer Mother     Hypertension Father      Social History     Socioeconomic History    Marital status:      Spouse name: Not on file    Number of children: Not on file    Years of education: Not on file    Highest education level: Not on file   Occupational History    Not on file   Tobacco Use    Smoking status: Never    Smokeless tobacco: Never   Vaping Use    Vaping Use: Never used   Substance and Sexual Activity    Alcohol use: Yes     Alcohol/week: 3.0 standard drinks     Types: 1 Glasses of wine, 1 Cans of beer, 1 Shots of liquor per week     Comment: ocassionally    Drug use: Never    Sexual activity: Not on file   Other Topics Concern    Not on file   Social History Narrative    Not on file     Social Determinants of Health     Financial Resource Strain: Not on file   Food Insecurity: Not on file   Transportation Needs: Not on file   Physical Activity: Not on file   Stress: Not on file   Social Connections: Not on file   Intimate Partner Violence: Not on file   Housing Stability: Not on file       HPI      ROS:   Review of Systems   Constitutional:  Positive for malaise/fatigue. Negative for chills and fever. Eyes:  Negative for blurred vision. Respiratory:  Negative for cough and shortness of breath. Cardiovascular:  Negative for chest pain, palpitations and leg swelling. Gastrointestinal:  Positive for constipation. Negative for abdominal pain, blood in stool, diarrhea, heartburn, melena, nausea and vomiting.    Neurological:  Negative for dizziness and headaches. Objective:   Visit Vitals  /72 (BP 1 Location: Left upper arm, BP Patient Position: Sitting, BP Cuff Size: Adult)   Pulse 85   Temp 97.7 °F (36.5 °C) (Temporal)   Resp 20   Ht 5' 3\" (1.6 m)   Wt 164 lb 6.4 oz (74.6 kg)   SpO2 100%   BMI 29.12 kg/m²         Vitals and Nurse Documentation reviewed. Physical Exam  Constitutional:       General: She is not in acute distress. Appearance: She is not diaphoretic. HENT:      Head: Normocephalic and atraumatic. Cardiovascular:      Rate and Rhythm: Normal rate and regular rhythm. Heart sounds: Normal heart sounds. No murmur heard. No friction rub. No gallop. Pulmonary:      Effort: Pulmonary effort is normal. No respiratory distress. Breath sounds: Normal breath sounds. No wheezing or rales. Abdominal:      General: Bowel sounds are normal.      Palpations: There is no hepatomegaly. Tenderness: There is no abdominal tenderness. Skin:     General: Skin is warm and dry. Coloration: Skin is not pale. Neurological:      Mental Status: She is alert.        Results for orders placed or performed in visit on 61/17/01   METABOLIC PANEL, BASIC   Result Value Ref Range    Sodium 135 (L) 136 - 145 mmol/L    Potassium 4.2 3.5 - 5.1 mmol/L    Chloride 104 97 - 108 mmol/L    CO2 25 21 - 32 mmol/L    Anion gap 6 5 - 15 mmol/L    Glucose 106 (H) 65 - 100 mg/dL    BUN 10 6 - 20 MG/DL    Creatinine 0.68 0.55 - 1.02 MG/DL    BUN/Creatinine ratio 15 12 - 20      eGFR >60 >60 ml/min/1.73m2    Calcium 10.0 8.5 - 10.1 MG/DL   CBC WITH AUTOMATED DIFF   Result Value Ref Range    WBC 10.2 3.6 - 11.0 K/uL    RBC 3.93 3.80 - 5.20 M/uL    HGB 9.4 (L) 11.5 - 16.0 g/dL    HCT 32.6 (L) 35.0 - 47.0 %    MCV 83.0 80.0 - 99.0 FL    MCH 23.9 (L) 26.0 - 34.0 PG    MCHC 28.8 (L) 30.0 - 36.5 g/dL    RDW 19.2 (H) 11.5 - 14.5 %    PLATELET 266 (H) 138 - 400 K/uL    MPV 10.1 8.9 - 12.9 FL    NRBC 0.0 0  WBC    ABSOLUTE NRBC 0.00 0.00 - 0.01 K/uL    NEUTROPHILS 61 32 - 75 %    LYMPHOCYTES 25 12 - 49 %    MONOCYTES 13 5 - 13 %    EOSINOPHILS 0 0 - 7 %    BASOPHILS 1 0 - 1 %    IMMATURE GRANULOCYTES 0 0.0 - 0.5 %    ABS. NEUTROPHILS 6.2 1.8 - 8.0 K/UL    ABS. LYMPHOCYTES 2.6 0.8 - 3.5 K/UL    ABS. MONOCYTES 1.3 (H) 0.0 - 1.0 K/UL    ABS. EOSINOPHILS 0.0 0.0 - 0.4 K/UL    ABS. BASOPHILS 0.1 0.0 - 0.1 K/UL    ABS. IMM.  GRANS. 0.0 0.00 - 0.04 K/UL    DF SMEAR SCANNED      PLATELET COMMENTS Large Platelets      RBC COMMENTS ANISOCYTOSIS  1+        RBC COMMENTS TARGET CELLS  PRESENT        RBC COMMENTS KVNG CELLS  PRESENT        RBC COMMENTS HYPOCHROMIA  1+       LIPASE   Result Value Ref Range    Lipase 48 (L) 73 - 393 U/L

## 2022-12-21 NOTE — PROGRESS NOTES
Patient stated name &   Chief Complaint   Patient presents with    Results     CT scan results        Health Maintenance Due   Topic    Hepatitis C Screening     Shingles Vaccine (1 of 2)    Cervical cancer screen     MenB Meningococcal topic (2 of 4 - Increased Risk Bexsero 2-dose series)    COVID-19 Vaccine (4 - Booster for Moderna series)       Wt Readings from Last 3 Encounters:   22 164 lb 6.4 oz (74.6 kg)   22 159 lb (72.1 kg)   22 170 lb (77.1 kg)     Temp Readings from Last 3 Encounters:   22 97.7 °F (36.5 °C) (Temporal)   22 97.6 °F (36.4 °C) (Skin)   22 98.2 °F (36.8 °C)     BP Readings from Last 3 Encounters:   22 108/72   22 112/79   22 122/85     Pulse Readings from Last 3 Encounters:   22 85   22 (!) 118   22 80         Learning Assessment:  :     No flowsheet data found. Depression Screening:  :     3 most recent PHQ Screens 2022   Little interest or pleasure in doing things Not at all   Feeling down, depressed, irritable, or hopeless Not at all   Total Score PHQ 2 0       Fall Risk Assessment:  :     No flowsheet data found. Abuse Screening:  :     Abuse Screening Questionnaire 2022   Do you ever feel afraid of your partner? N   Are you in a relationship with someone who physically or mentally threatens you? N   Is it safe for you to go home? Y       Coordination of Care Questionnaire:  :   1. \"Have you been to the ER, urgent care clinic since your last visit? Hospitalized since your last visit? \" No    2. \"Have you seen or consulted any other health care providers outside of the [address] [place] [address] since your last visit? \" No     3. For patients aged 39-70: Has the patient had a colonoscopy / FIT/ Cologuard? No      If the patient is female:    4. For patients aged 41-77: Has the patient had a mammogram within the past 2 years? No      5. For patients aged 21-65: Has the patient had a pap smear?  No 3) Do you have an Advance Directive on file? no  Are you interested in receiving information about Advance Directives? no    Patient is accompanied by self I have received verbal consent from Graciela Link to discuss any/all medical information while they are present in the room.

## 2023-01-06 ENCOUNTER — TELEPHONE (OUTPATIENT)
Dept: FAMILY MEDICINE CLINIC | Age: 38
End: 2023-01-06

## 2023-01-06 DIAGNOSIS — D75.839 THROMBOCYTOSIS: Primary | ICD-10-CM

## 2023-01-13 DIAGNOSIS — K85.90 ACUTE PANCREATITIS, UNSPECIFIED COMPLICATION STATUS, UNSPECIFIED PANCREATITIS TYPE: Primary | ICD-10-CM

## 2023-01-13 DIAGNOSIS — K86.89 PANCREATIC DUCT DILATED: ICD-10-CM

## 2023-02-06 ENCOUNTER — VIRTUAL VISIT (OUTPATIENT)
Dept: FAMILY MEDICINE CLINIC | Age: 38
End: 2023-02-06
Payer: OTHER GOVERNMENT

## 2023-02-06 DIAGNOSIS — Z02.89 ENCOUNTER FOR COMPLETION OF FORM WITH PATIENT: Primary | ICD-10-CM

## 2023-02-06 DIAGNOSIS — Z86.718 HISTORY OF BLOOD CLOTS: ICD-10-CM

## 2023-02-06 PROCEDURE — 99213 OFFICE O/P EST LOW 20 MIN: CPT | Performed by: NURSE PRACTITIONER

## 2023-02-06 NOTE — PROGRESS NOTES
Ayaan Ayers is a 30 y.o. female who was seen by synchronous (real-time) audio-video technology on 2/6/2023 for No chief complaint on file. Assessment & Plan:   Diagnoses and all orders for this visit:    1. Encounter for completion of form with patient  -form completed  for GI group for endoscopy procedure. Advised to stop xarelto 1 day before procedure and restart one day after procedure. Advised of risks of being off xarelto and history of clots. Follow up as needed. Form faxed to GI as requested. 2. History of blood clots  - Risks discussed, patient verbalized understanding. Follow up as needed      I spent at least 8 minutes on this visit with this established patient. Subjective:   VV today for compa   Has tiny hole in heart and had blood clot in one arm in 2019. Has not had a clot since. Scheduled for achilles surgery on March 3rd. Ortho is supposed to send a form  Advised to call office closer to surgery to see if received    Prior to Admission medications    Medication Sig Start Date End Date Taking? Authorizing Provider   hydroCHLOROthiazide (HYDRODIURIL) 25 mg tablet Take 1 Tablet by mouth daily. 11/11/22   Shonda Bernal MD   senna-docusate (Senna with Docusate Sodium) 8.6-50 mg per tablet Take 1 Tablet by mouth daily. 11/11/22   Shar Merritt MD   ondansetron (ZOFRAN ODT) 4 mg disintegrating tablet Take 1 Tablet by mouth every eight (8) hours as needed for Nausea or Vomiting. 11/11/22   Shar Merritt MD   tamsulosin (Flomax) 0.4 mg capsule Take 1 Capsule by mouth daily. Indications: stones in the urinary tract 11/11/22   Shar Merritt MD   potassium chloride (K-DUR, KLOR-CON M20) 20 mEq tablet Take 1 Tablet by mouth daily. 11/11/22   Shar Merritt MD   ondansetron (ZOFRAN ODT) 4 mg disintegrating tablet  8/31/22   Provider, Historical   multivitamin (ONE A DAY) tablet Take 1 Tablet by mouth daily.     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 Eyes:  Negative for blurred vision. Respiratory:  Negative for cough and shortness of breath. Cardiovascular:  Negative for chest pain, palpitations and leg swelling. Neurological:  Negative for dizziness and headaches. Objective:   No flowsheet data found.      [INSTRUCTIONS:  \"[x]\" Indicates a positive item  \"[]\" Indicates a negative item  -- DELETE ALL ITEMS NOT EXAMINED]    Constitutional: [x] Appears well-developed and well-nourished [x] No apparent distress      [] Abnormal -     Mental status: [x] Alert and awake  [x] Oriented to person/place/time [x] Able to follow commands    [] Abnormal -     Eyes:   EOM    [x]  Normal    [] Abnormal -   Sclera  [x]  Normal    [] Abnormal -          Discharge [x]  None visible   [] Abnormal -     HENT: [x] Normocephalic, atraumatic  [] Abnormal -   [x] Mouth/Throat: Mucous membranes are moist    External Ears [x] Normal  [] Abnormal -    Neck: [x] No visualized mass [] Abnormal -     Pulmonary/Chest: [x] Respiratory effort normal   [x] No visualized signs of difficulty breathing or respiratory distress        [] Abnormal -      Musculoskeletal:   [x] Normal gait with no signs of ataxia         [x] Normal range of motion of neck        [] Abnormal -     Neurological:        [x] No Facial Asymmetry (Cranial nerve 7 motor function) (limited exam due to video visit)          [x] No gaze palsy        [] Abnormal -          Skin:        [x] No significant exanthematous lesions or discoloration noted on facial skin         [] Abnormal -            Psychiatric:       [x] Normal Affect [] Abnormal -        [x] No Hallucinations    Other pertinent observable physical exam findings:-        We discussed the expected course, resolution and complications of the diagnosis(es) in detail. Medication risks, benefits, costs, interactions, and alternatives were discussed as indicated. I advised her to contact the office if her condition worsens, changes or fails to improve as anticipated. She expressed understanding with the diagnosis(es) and plan. Ashish Bills, was evaluated through a synchronous (real-time) audio-video encounter. The patient (or guardian if applicable) is aware that this is a billable service, which includes applicable co-pays. This Virtual Visit was conducted with patient's (and/or legal guardian's) consent. The visit was conducted pursuant to the emergency declaration under the [address] [place] [address] authority and the [employer] Act. Patient identification was verified, and a caregiver was present when appropriate.   The patient was located at: Home: [place] [id number]  The provider was located at: Home: [facility] [address], NP

## 2023-02-27 ENCOUNTER — OFFICE VISIT (OUTPATIENT)
Dept: ONCOLOGY | Age: 38
End: 2023-02-27
Payer: OTHER GOVERNMENT

## 2023-02-27 VITALS
HEART RATE: 80 BPM | OXYGEN SATURATION: 99 % | TEMPERATURE: 97.8 F | WEIGHT: 176 LBS | HEIGHT: 63 IN | BODY MASS INDEX: 31.18 KG/M2 | DIASTOLIC BLOOD PRESSURE: 76 MMHG | RESPIRATION RATE: 18 BRPM | SYSTOLIC BLOOD PRESSURE: 116 MMHG

## 2023-02-27 DIAGNOSIS — R71.8 RBC MICROCYTOSIS: ICD-10-CM

## 2023-02-27 DIAGNOSIS — R53.83 OTHER FATIGUE: ICD-10-CM

## 2023-02-27 DIAGNOSIS — D75.839 THROMBOCYTOSIS: Primary | ICD-10-CM

## 2023-02-27 PROCEDURE — 99204 OFFICE O/P NEW MOD 45 MIN: CPT | Performed by: INTERNAL MEDICINE

## 2023-02-27 PROCEDURE — 3074F SYST BP LT 130 MM HG: CPT | Performed by: INTERNAL MEDICINE

## 2023-02-27 PROCEDURE — 3078F DIAST BP <80 MM HG: CPT | Performed by: INTERNAL MEDICINE

## 2023-02-27 NOTE — LETTER
2/28/2023    Patient: Enedelia Lamb   YOB: 1985   Date of Visit: 2/27/2023     Kimberly Olivas NP  [address]    Dear Kimberly Olivas NP,      Thank you for referring Ms. Enedelia Lamb to [facility] for evaluation. My notes for this consultation are attached. If you have questions, please do not hesitate to call me. I look forward to following your patient along with you.       Sincerely,    Neeru Olivera MD

## 2023-02-27 NOTE — PROGRESS NOTES
1. Have you been to the ER, urgent care clinic since your last visit? Hospitalized since your last visit? NP    2. Have you seen or consulted any other health care providers outside of the [address] [place] [address] since your last visit? Include any pap smears or colon screening.   NP        Visit Vitals  /76 (BP 1 Location: Left upper arm, BP Patient Position: Sitting, BP Cuff Size: Large adult)   Pulse 80   Temp 97.8 °F (36.6 °C) (Oral)   Resp 18   Ht 5' 3\" (1.6 m)   Wt 176 lb (79.8 kg)   SpO2 99%   BMI 31.18 kg/m²            Chief Complaint   Patient presents with    New Patient

## 2023-02-27 NOTE — PROGRESS NOTES
[facility] at [address], [facility]: [phone]  F: [phone] Patient ID  Name: Adelaide Hess  YOB: 1985  MRN: [id number]  Referring Provider:   Yunior Jones NP  [address]  [place],  [address]  Primary Care Provider:   Yunior Jones NP       HEMATOLOGY/MEDICAL ONCOLOGY  NOTE     Reason for Evaluation:     Chief Complaint   Patient presents with    New Patient     Thrombocytosis,rbc microcytosis    Subjective:     History of Present Illness:     Date of Visit: 23   Adelaide Hess is a 30 y.o. F who presents for an initial evaluation for thrombocytosis. This is a chronic problem for . Problem has occurred for months at the least. Problem has remained stable. Patient reports dealing with significant fatigue. Patient reports adequate oral intake of food and hydration. Patient reports constipation. She takes miralax and metamucil. She has taken colace in the past. Denies any bowel or bladder problems otherwise. Patient denies any uncontrolled pain. Patient denies any shortness of breath. Patient denies any ice cravings. Patient reports fatigue. Patient denies any heavy menses. Patient reports taking oral iron daily for at least one month. She has been taking oral iron supplements since at least 2022. She reports no heavy menses. -  Past Medical History:   Diagnosis Date    Neuropathy     Reportedly admitted in 2019 in [place]; felt to be related to prolonged hopsitalization. Pancreatitis 10/20/2022    ?  of Alcohol per patient      Past Surgical History:   Procedure Laterality Date    HX APPENDECTOMY      HX  SECTION      HX SPLENECTOMY      reportedly after a splenic rupture      Social History     Tobacco Use    Smoking status: Former     Packs/day: 1.00     Years: 15.00     Pack years: 15.00     Types: Cigarettes     Quit date: 2019     Years since quittin.1 Smokeless tobacco: Never   Substance Use Topics    Alcohol use: Not Currently     Alcohol/week: 3.0 standard drinks     Types: 1 Glasses of wine, 1 Cans of beer, 1 Shots of liquor per week     Comment: ocassionally      Family History   Problem Relation Age of Onset    Ovarian Cancer Mother         reportedly took chemotherapy and had surgery. Hypertension Father     Other cancer Neg Hx         unaware of any hematologic malignancy     Current Outpatient Medications   Medication Sig    ferrous sulfate (IRON PO) Take  by mouth.    rivaroxaban (XARELTO) 20 mg tab tablet Take  by mouth daily. hydroCHLOROthiazide (HYDRODIURIL) 25 mg tablet Take 1 Tablet by mouth daily. multivitamin (ONE A DAY) tablet Take 1 Tablet by mouth daily. senna-docusate (Senna with Docusate Sodium) 8.6-50 mg per tablet Take 1 Tablet by mouth daily. ondansetron (ZOFRAN ODT) 4 mg disintegrating tablet Take 1 Tablet by mouth every eight (8) hours as needed for Nausea or Vomiting.    tamsulosin (Flomax) 0.4 mg capsule Take 1 Capsule by mouth daily. Indications: stones in the urinary tract    potassium chloride (K-DUR, KLOR-CON M20) 20 mEq tablet Take 1 Tablet by mouth daily. ondansetron (ZOFRAN ODT) 4 mg disintegrating tablet      No current facility-administered medications for this visit. Allergies   Allergen Reactions    Other Medication Hives     Sulfa drugs    Sulfa (Sulfonamide Antibiotics) Hives      -  Review of Systems provided by:patient  General: denies fever, denies chills, denies night sweats, and reports fatigue  Eyes: denies any acute vision loss, denies any eye pain, and reports a history of blurred vision and question about contact lens use and question of dry eyes  HEENT: denies epistaxis and denies trouble swallowing  Cardio: denies any chest pain and denies any leg swelling  Resp: denies any shortness of breath. denies any cough. denies any hemoptysis.   Abdomen: denies any abdominal pain, denies any nausea, denies any vomiting, denies any diarrhea, denies any bloody stools, and denies any melena  MSK: reports myalgias and reports arthralgias  Skin: denies any rash and denies any itching  Lymph: reports lymph node enlargement. In the neck aand reports lymph node mild tenderness. Neuro: denies any tremor and reports a history of headaches  : Denies any dysuria. Denies any hematuria. Psych: denies suicidal ideations and denies homicidal ideations; reports depressiona nd anxiety. Objective:   Visit Vitals  /76 (BP 1 Location: Left upper arm, BP Patient Position: Sitting, BP Cuff Size: Large adult)   Pulse 80   Temp 97.8 °F (36.6 °C) (Oral)   Resp 18   Ht 5' 3\" (1.6 m)   Wt 176 lb (79.8 kg)   SpO2 99%   BMI 31.18 kg/m²     ECOG PS: 1- Restricted in physically strenuous activity but ambulatory and able to carry out work of a light or sedentary nature, e.g., light house work, office work. Physical Exam  Constitutional: No acute distress. and Non-toxic appearance. HENT: Normocephalic and atraumatic head. Eyes: Normal Conjunctivae. Anicteric sclerae. Cardiovascular: S1,S2 auscultated. No pitting edema. Pulmonary: Normal Respiratory Effort. No wheezing. No rhonchi. No rales. Abdominal: Normal bowel sounds. Soft Abdomen to palpation. No abdominal tenderness. No guarding. Musculoskeletal: No muscle pain on palpation. No temporal muscle wasting on inspection. Wearing a left foot/ankle brace. Skin: No jaundice. No rash. No petechiae. Neurological: Alert and oriented. No tremor on inspection. Psychiatric: mood normal. normal speech rate. normal affect. Lymph: No cervical, supraclavicular,or axillary lymph node enlargement or tenderness.     Results:     I personally reviewed Epic EHR labs/results below:   Lab Results   Component Value Date/Time    WBC 10.2 12/21/2022 10:51 AM    HGB 9.4 (L) 12/21/2022 10:51 AM    HCT 32.6 (L) 12/21/2022 10:51 AM    PLATELET 457 (H) 51/78/7860 10:51 AM    MCV 83.0 12/21/2022 10:51 AM    ABS. NEUTROPHILS 6.2 12/21/2022 10:51 AM     Lab Results   Component Value Date/Time    Sodium 135 (L) 12/21/2022 10:51 AM    Potassium 4.2 12/21/2022 10:51 AM    Chloride 104 12/21/2022 10:51 AM    CO2 25 12/21/2022 10:51 AM    Glucose 106 (H) 12/21/2022 10:51 AM    BUN 10 12/21/2022 10:51 AM    Creatinine 0.68 12/21/2022 10:51 AM    GFR est AA >60 08/09/2022 03:35 PM    GFR est non-AA >60 08/09/2022 03:35 PM    Calcium 10.0 12/21/2022 10:51 AM     Lab Results   Component Value Date/Time    Bilirubin, total 0.7 11/17/2022 01:49 PM    ALT (SGPT) 19 11/17/2022 01:49 PM    Alk. phosphatase 131 (H) 11/17/2022 01:49 PM    Protein, total 8.1 11/17/2022 01:49 PM    Albumin 3.8 11/17/2022 01:49 PM    Globulin 4.3 (H) 11/17/2022 01:49 PM       Assessment and Recommendations:     1. Thrombocytosis  -very well could be secondary thrombocytosis; she has palpebral pallor; high concern for iron deficiency. Ordered the following:  - PERIPHERAL SMEAR; Future  - FERRITIN; Future  - IRON PROFILE; Future  - VITAMIN B12; Future  - CBC WITH AUTOMATED DIFF; Future    -will hold off on a primary thrombocytosis work-up at this time. 2. RBC microcytosis  -checking CBC/differential.  Today, we discussed that iron deficiency is not a disease in itself but rather a symptom of another disease and it remains important that the cause of iron deficiency always be determined at best effort. We discussed that this could be an issue with the G.I. tract and iron absorption and due to sources of gross or occult bleeding. We discussed that iron deficiency could be simply related to excessive bleeding with iron loss. We discussed that iron loss can sometimes be a sign of occult bleeding in the G.I. tract. We discussed that attempts to replete iron through the gut is first attempted if tolerable. However, we discussed that Iron deficiency may need IV iron repletion.  We discussed that this could be with iron sucrose or injectafer. We discussed the IV iron repletion is typically well tolerated with a small risk of G. I. toxicity such as gastritis or constipation. We discussed it rarely can cause anaphylactic shock and resultant death (could be a rare event). GI evaluation with endoscopies should be considered especially if there is no obvious bleeding explanation for iron deficiency. We discussed that iron sucrose is typically used for patients in pregnancy due to more studies. Also, IV Iron repletion is typically only pursued in the second and third trimesters. 3. Other fatigue  -discussed with her that it would be a surprise if she is not found to have iron deficiency anemia. We will proceed with IV Iron if her iron stores remain low despite oral iron. FOLLOW-UP:  Follow-up and Dispositions    Return in about 9 weeks (around 5/1/2023).            Jono Mon MD  Hematology/Medical Oncology Provider  [facility]  P: [phone]    Signed By:   Melissa Oneill MD

## 2023-02-28 ENCOUNTER — TELEPHONE (OUTPATIENT)
Dept: ONCOLOGY | Age: 38
End: 2023-02-28

## 2023-02-28 ENCOUNTER — DOCUMENTATION ONLY (OUTPATIENT)
Dept: ONCOLOGY | Age: 38
End: 2023-02-28

## 2023-02-28 ENCOUNTER — HOSPITAL ENCOUNTER (OUTPATIENT)
Dept: INFUSION THERAPY | Age: 38
Discharge: HOME | End: 2023-02-28
Payer: OTHER GOVERNMENT

## 2023-02-28 VITALS
HEART RATE: 77 BPM | DIASTOLIC BLOOD PRESSURE: 65 MMHG | OXYGEN SATURATION: 100 % | RESPIRATION RATE: 16 BRPM | TEMPERATURE: 98.7 F | SYSTOLIC BLOOD PRESSURE: 124 MMHG

## 2023-02-28 LAB — HISTORY CHECKED?,CKHIST: NORMAL

## 2023-02-28 PROCEDURE — P9016 RBC LEUKOCYTES REDUCED: HCPCS

## 2023-02-28 PROCEDURE — 86923 COMPATIBILITY TEST ELECTRIC: CPT

## 2023-02-28 PROCEDURE — 36430 TRANSFUSION BLD/BLD COMPNT: CPT

## 2023-02-28 PROCEDURE — 36415 COLL VENOUS BLD VENIPUNCTURE: CPT

## 2023-02-28 PROCEDURE — 86900 BLOOD TYPING SEROLOGIC ABO: CPT

## 2023-02-28 RX ORDER — SODIUM CHLORIDE 9 MG/ML
250 INJECTION, SOLUTION INTRAVENOUS AS NEEDED
Status: DISCONTINUED | OUTPATIENT
Start: 2023-02-28 | End: 2023-03-01 | Stop reason: HOSPADM

## 2023-02-28 NOTE — TELEPHONE ENCOUNTER
[address] at [facility] 88  [phone]    02/28/23 10:29 AM - Returned Isabella's call with [employer]. She states the patient had a critical hemoglobin result of 5.7. Advised this nurse would update Dr. Bernardo Bedoya of above. Charli Lehman voiced understanding and gratitude for the call. No further questions or concerns. [address] at [facility] 88  [phone]    02/28/23 10:55 AM - Called and spoke with the patient. Patient's ID verified x 2. Advised her hemoglobin came back low at 5.7. Advised Dr. Bernardo Bedoya would like her to come into the infusion center today ASA for a blood transfusion. Advised she will be consented for this once here. Advised the patient of the [address] [place] [address] location. The patient states she cannot drive right now as she doesn't have a car but she will call around and get a ride ASAP. She inquired if this will affect her foot drop surgery on Friday. Advised this nurse cannot say for sure. Advised they will likely do pre-surgery testing as well. Advised right now we need to get her hemoglobin level up. The patient voiced understanding and gratitude for the call. She states she will be here as soon as she can. No further questions or concerns.

## 2023-02-28 NOTE — PROGRESS NOTES
I have discussed with the patient the rationale for blood component transfusion; its benefits in treating or preventing fatigue, organ damage, or death; and its risk which includes mild transfusion reactions, rare risk of blood borne infection, or more serious but rare reactions. I have discussed the alternatives to transfusion, including the risk and consequences of not receiving transfusion. The patient had an opportunity to ask questions and had agreed to proceed with transfusion of blood components. We discussed the risk of HIV,HCV,HBV being low but still a risk. However, the risk of ischemia from severe anemia seems to currently be a greater risk. Please contact us if any new questions or concerns. Non-Urgent Matters: Call our clinic at [phone] during open clinic hours. Please note that Nitro PDF Messages may not be immediately returned. Urgent Matters: Call hospital  ([address], or [facility]) at night or on weekends for questions that cannot wait until clinic opens. Emergency: Call 9-1-1.     Ulises Martínez MD  Hematology/Medical Oncology Physician  [facility]  P: [phone]

## 2023-02-28 NOTE — PROGRESS NOTES
[facility] Progress Note    Date: 2023    Name: Fran Smallwood    MRN: [id number]         : 1985    Ms. Carlos Batres Arrived ambulatory and in no distress for Blood Transfusion. Assessment was completed, no acute issues at this time, no new complaints voiced. Right PIV established with blood return. Signs/symptoms of adverse blood reaction discussed with pt, voiced understanding. Ms. Toña Echevarria vitals were reviewed. Visit Vitals  /68   Pulse 80   Temp 98.9 °F (37.2 °C)   Resp 17   SpO2 100%   Breastfeeding No       Lab results were obtained and reviewed. Recent Results (from the past 12 hour(s))   TYPE & SCREEN    Collection Time: 23 12:44 PM   Result Value Ref Range    Crossmatch Expiration 2023,2357     ABO/Rh(D) O POSITIVE     Antibody screen NEG     Unit number [id number]     Blood component type  LR     Unit division 00     Status of unit ISSUED     Crossmatch result Compatible     Unit number [id number]     Blood component type  LR     Unit division 00     Status of unit ISSUED     Crossmatch result Compatible    RBC, ALLOCATE    Collection Time: 23 12:45 PM   Result Value Ref Range    HISTORY CHECKED? Historical check performed        1440:  1st unit PRBCs started and infusing without difficulty, observed x 15 minutes  1710:  1st unit completed without adverse reaction noted, NS flushing line. 1715:  2nd unit PRBCs started and infusing without difficulty, observed x 15 minutes  1940:  2nd unit completed without adverse reaction noted, NS flushing line. Ms. Carlos Batres tolerated treatment well and was discharged from [place] [address] in stable condition at [address] [place] [address]. PIV flushed and removed. She is to return on May 3, 2023 at 1330 for her next appointment. D/C instructions reviewed, copy to pt, voiced understanding. Patient declined 1 hour post transfusion observation.     Mike Mast  2023

## 2023-03-02 ENCOUNTER — TELEPHONE (OUTPATIENT)
Dept: ONCOLOGY | Age: 38
End: 2023-03-02

## 2023-03-02 ENCOUNTER — HOSPITAL ENCOUNTER (OUTPATIENT)
Dept: INFUSION THERAPY | Age: 38
Discharge: HOME | End: 2023-03-02
Payer: OTHER GOVERNMENT

## 2023-03-02 DIAGNOSIS — R53.83 OTHER FATIGUE: ICD-10-CM

## 2023-03-02 DIAGNOSIS — R71.8 RBC MICROCYTOSIS: ICD-10-CM

## 2023-03-02 DIAGNOSIS — D75.839 THROMBOCYTOSIS: Primary | ICD-10-CM

## 2023-03-02 LAB
ABO + RH BLD: NORMAL
ABO + RH BLD: NORMAL
BASOPHILS # BLD: 0.2 K/UL (ref 0–0.1)
BASOPHILS NFR BLD: 2 % (ref 0–1)
BLD PROD TYP BPU: NORMAL
BLD PROD TYP BPU: NORMAL
BLOOD GROUP ANTIBODIES SERPL: NORMAL
BLOOD GROUP ANTIBODIES SERPL: NORMAL
BPU ID: NORMAL
BPU ID: NORMAL
CROSSMATCH RESULT,%XM: NORMAL
CROSSMATCH RESULT,%XM: NORMAL
DIFFERENTIAL METHOD BLD: ABNORMAL
EOSINOPHIL # BLD: 0 K/UL (ref 0–0.4)
EOSINOPHIL NFR BLD: 0 % (ref 0–7)
ERYTHROCYTE [DISTWIDTH] IN BLOOD BY AUTOMATED COUNT: 22.4 % (ref 11.5–14.5)
FERRITIN SERPL-MCNC: 11 NG/ML (ref 26–388)
HCT VFR BLD AUTO: 28.6 % (ref 35–47)
HGB BLD-MCNC: 8.4 G/DL (ref 11.5–16)
IMM GRANULOCYTES # BLD AUTO: 0 K/UL (ref 0–0.04)
IMM GRANULOCYTES NFR BLD AUTO: 0 % (ref 0–0.5)
IRON SATN MFR SERPL: 2 % (ref 20–50)
IRON SERPL-MCNC: 12 UG/DL (ref 35–150)
LYMPHOCYTES # BLD: 3.1 K/UL (ref 0.8–3.5)
LYMPHOCYTES NFR BLD: 30 % (ref 12–49)
MCH RBC QN AUTO: 21.8 PG (ref 26–34)
MCHC RBC AUTO-ENTMCNC: 29.4 G/DL (ref 30–36.5)
MCV RBC AUTO: 74.1 FL (ref 80–99)
MONOCYTES # BLD: 1.6 K/UL (ref 0–1)
MONOCYTES NFR BLD: 16 % (ref 5–13)
NEUTS SEG # BLD: 5.4 K/UL (ref 1.8–8)
NEUTS SEG NFR BLD: 52 % (ref 32–75)
NRBC # BLD: 0.06 K/UL (ref 0–0.01)
NRBC BLD-RTO: 0.6 PER 100 WBC
PLATELET # BLD AUTO: 257 K/UL (ref 150–400)
PMV BLD AUTO: 9.7 FL (ref 8.9–12.9)
RBC # BLD AUTO: 3.86 M/UL (ref 3.8–5.2)
RBC MORPH BLD: ABNORMAL
SPECIMEN EXP DATE BLD: NORMAL
SPECIMEN EXP DATE BLD: NORMAL
STATUS OF UNIT,%ST: NORMAL
STATUS OF UNIT,%ST: NORMAL
TIBC SERPL-MCNC: 542 UG/DL (ref 250–450)
UNIT DIVISION, %UDIV: 0
UNIT DIVISION, %UDIV: 0
WBC # BLD AUTO: 10.3 K/UL (ref 3.6–11)

## 2023-03-02 PROCEDURE — 82728 ASSAY OF FERRITIN: CPT

## 2023-03-02 PROCEDURE — 85025 COMPLETE CBC W/AUTO DIFF WBC: CPT

## 2023-03-02 PROCEDURE — 86900 BLOOD TYPING SEROLOGIC ABO: CPT

## 2023-03-02 PROCEDURE — 83540 ASSAY OF IRON: CPT

## 2023-03-02 PROCEDURE — 36415 COLL VENOUS BLD VENIPUNCTURE: CPT

## 2023-03-02 RX ORDER — HEPARIN 100 UNIT/ML
500 SYRINGE INTRAVENOUS AS NEEDED
Status: CANCELLED
Start: 2023-03-02

## 2023-03-02 RX ORDER — SODIUM CHLORIDE 0.9 % (FLUSH) 0.9 %
5-40 SYRINGE (ML) INJECTION AS NEEDED
OUTPATIENT
Start: 2023-03-02

## 2023-03-02 RX ORDER — SODIUM CHLORIDE 9 MG/ML
5-40 INJECTION INTRAVENOUS AS NEEDED
OUTPATIENT
Start: 2023-03-02

## 2023-03-02 RX ORDER — SODIUM CHLORIDE 9 MG/ML
5-250 INJECTION, SOLUTION INTRAVENOUS AS NEEDED
OUTPATIENT
Start: 2023-03-02

## 2023-03-02 NOTE — TELEPHONE ENCOUNTER
[address] at [facility] 88  [phone]    03/02/23 12:55 PM - Called and spoke with the patient. Patient's ID verified x 2. She states she is still feeling very fatigued. Advised, per Dr. Naima Willard, he'd like her to come into the infusion center today for labs. Advised the patient not to take any oral iron today so her iron studies results will be more accurate. The patient voiced understanding and gratitude for the call. She is now scheduled for labs in [facility] at 3:30 PM.  No further questions or concerns.

## 2023-03-02 NOTE — PROGRESS NOTES
Anemia improved. please let your surgeon know your levels. It will be up to your surgeon if you should proceed with the operation with your current hemoglobin level.   Thanks, Dr. Carrington Marin Given to dania

## 2023-03-02 NOTE — TELEPHONE ENCOUNTER
Patient called and stated that she came in for a blood transfusion on Tuesday and has been feeling the same and that she was wondering if she can come in for blood work to see whats going on.        # [phone]

## 2023-03-03 DIAGNOSIS — D50.8 OTHER IRON DEFICIENCY ANEMIA: ICD-10-CM

## 2023-03-03 PROBLEM — D50.9 IRON DEFICIENCY ANEMIA: Status: ACTIVE | Noted: 2023-03-03

## 2023-03-03 NOTE — PROGRESS NOTES
Hi, without the oral iron we can clearly see that you have Iron Deficiency. These labs confirm it. I will request the IV Iron orders and date be scheduled. Our scheduling team should contact you.     Thanks,    Dr. Angelique Bryant

## 2023-03-08 ENCOUNTER — HOSPITAL ENCOUNTER (OUTPATIENT)
Dept: INFUSION THERAPY | Age: 38
Discharge: HOME | End: 2023-03-08
Payer: OTHER GOVERNMENT

## 2023-03-08 VITALS
SYSTOLIC BLOOD PRESSURE: 125 MMHG | HEART RATE: 72 BPM | RESPIRATION RATE: 18 BRPM | OXYGEN SATURATION: 100 % | TEMPERATURE: 98.2 F | DIASTOLIC BLOOD PRESSURE: 75 MMHG

## 2023-03-08 DIAGNOSIS — D50.8 OTHER IRON DEFICIENCY ANEMIA: Primary | ICD-10-CM

## 2023-03-08 DIAGNOSIS — R71.8 RBC MICROCYTOSIS: ICD-10-CM

## 2023-03-08 PROCEDURE — 74011000258 HC RX REV CODE- 258: Performed by: INTERNAL MEDICINE

## 2023-03-08 PROCEDURE — 74011250636 HC RX REV CODE- 250/636: Performed by: INTERNAL MEDICINE

## 2023-03-08 PROCEDURE — 96365 THER/PROPH/DIAG IV INF INIT: CPT

## 2023-03-08 RX ORDER — SODIUM CHLORIDE 0.9 % (FLUSH) 0.9 %
5-40 SYRINGE (ML) INJECTION AS NEEDED
OUTPATIENT
Start: 2023-03-15

## 2023-03-08 RX ORDER — SODIUM CHLORIDE 9 MG/ML
5-40 INJECTION INTRAVENOUS AS NEEDED
Status: DISCONTINUED | OUTPATIENT
Start: 2023-03-08 | End: 2023-03-09 | Stop reason: HOSPADM

## 2023-03-08 RX ORDER — ACETAMINOPHEN 325 MG/1
650 TABLET ORAL AS NEEDED
Start: 2023-03-22

## 2023-03-08 RX ORDER — ALBUTEROL SULFATE 0.83 MG/ML
2.5 SOLUTION RESPIRATORY (INHALATION) AS NEEDED
Start: 2023-04-05

## 2023-03-08 RX ORDER — DIPHENHYDRAMINE HYDROCHLORIDE 50 MG/ML
50 INJECTION, SOLUTION INTRAMUSCULAR; INTRAVENOUS AS NEEDED
Start: 2023-04-05

## 2023-03-08 RX ORDER — EPINEPHRINE 1 MG/ML
0.3 INJECTION, SOLUTION, CONCENTRATE INTRAVENOUS AS NEEDED
OUTPATIENT
Start: 2023-04-05

## 2023-03-08 RX ORDER — ALBUTEROL SULFATE 0.83 MG/ML
2.5 SOLUTION RESPIRATORY (INHALATION) AS NEEDED
Start: 2023-03-15

## 2023-03-08 RX ORDER — DIPHENHYDRAMINE HYDROCHLORIDE 50 MG/ML
25 INJECTION, SOLUTION INTRAMUSCULAR; INTRAVENOUS AS NEEDED
Start: 2023-03-15

## 2023-03-08 RX ORDER — DIPHENHYDRAMINE HYDROCHLORIDE 50 MG/ML
50 INJECTION, SOLUTION INTRAMUSCULAR; INTRAVENOUS AS NEEDED
Start: 2023-03-15

## 2023-03-08 RX ORDER — ONDANSETRON 2 MG/ML
8 INJECTION INTRAMUSCULAR; INTRAVENOUS AS NEEDED
OUTPATIENT
Start: 2023-03-29

## 2023-03-08 RX ORDER — DIPHENHYDRAMINE HYDROCHLORIDE 50 MG/ML
25 INJECTION, SOLUTION INTRAMUSCULAR; INTRAVENOUS AS NEEDED
Status: CANCELLED
Start: 2023-03-08

## 2023-03-08 RX ORDER — HEPARIN 100 UNIT/ML
500 SYRINGE INTRAVENOUS AS NEEDED
Start: 2023-03-15

## 2023-03-08 RX ORDER — ONDANSETRON 2 MG/ML
8 INJECTION INTRAMUSCULAR; INTRAVENOUS AS NEEDED
Status: CANCELLED | OUTPATIENT
Start: 2023-03-08

## 2023-03-08 RX ORDER — DIPHENHYDRAMINE HYDROCHLORIDE 50 MG/ML
25 INJECTION, SOLUTION INTRAMUSCULAR; INTRAVENOUS AS NEEDED
Start: 2023-04-05

## 2023-03-08 RX ORDER — ACETAMINOPHEN 325 MG/1
650 TABLET ORAL AS NEEDED
Status: CANCELLED
Start: 2023-03-08

## 2023-03-08 RX ORDER — SODIUM CHLORIDE 9 MG/ML
5-250 INJECTION, SOLUTION INTRAVENOUS AS NEEDED
OUTPATIENT
Start: 2023-03-22

## 2023-03-08 RX ORDER — ALBUTEROL SULFATE 0.83 MG/ML
2.5 SOLUTION RESPIRATORY (INHALATION) AS NEEDED
Start: 2023-03-22

## 2023-03-08 RX ORDER — SODIUM CHLORIDE 9 MG/ML
5-250 INJECTION, SOLUTION INTRAVENOUS AS NEEDED
OUTPATIENT
Start: 2023-03-29

## 2023-03-08 RX ORDER — DIPHENHYDRAMINE HYDROCHLORIDE 50 MG/ML
25 INJECTION, SOLUTION INTRAMUSCULAR; INTRAVENOUS AS NEEDED
Status: DISCONTINUED | OUTPATIENT
Start: 2023-03-08 | End: 2023-03-09 | Stop reason: HOSPADM

## 2023-03-08 RX ORDER — ALBUTEROL SULFATE 0.83 MG/ML
2.5 SOLUTION RESPIRATORY (INHALATION) AS NEEDED
Status: CANCELLED
Start: 2023-03-08

## 2023-03-08 RX ORDER — EPINEPHRINE 1 MG/ML
0.3 INJECTION, SOLUTION, CONCENTRATE INTRAVENOUS AS NEEDED
OUTPATIENT
Start: 2023-03-22

## 2023-03-08 RX ORDER — DIPHENHYDRAMINE HYDROCHLORIDE 50 MG/ML
50 INJECTION, SOLUTION INTRAMUSCULAR; INTRAVENOUS AS NEEDED
Start: 2023-03-29

## 2023-03-08 RX ORDER — DIPHENHYDRAMINE HYDROCHLORIDE 50 MG/ML
25 INJECTION, SOLUTION INTRAMUSCULAR; INTRAVENOUS AS NEEDED
Start: 2023-03-22

## 2023-03-08 RX ORDER — HYDROCORTISONE SODIUM SUCCINATE 100 MG/2ML
100 INJECTION, POWDER, FOR SOLUTION INTRAMUSCULAR; INTRAVENOUS AS NEEDED
OUTPATIENT
Start: 2023-04-05

## 2023-03-08 RX ORDER — SODIUM CHLORIDE 9 MG/ML
5-250 INJECTION, SOLUTION INTRAVENOUS AS NEEDED
Status: DISCONTINUED | OUTPATIENT
Start: 2023-03-08 | End: 2023-03-09 | Stop reason: HOSPADM

## 2023-03-08 RX ORDER — ONDANSETRON 2 MG/ML
8 INJECTION INTRAMUSCULAR; INTRAVENOUS AS NEEDED
OUTPATIENT
Start: 2023-04-05

## 2023-03-08 RX ORDER — SODIUM CHLORIDE 9 MG/ML
5-250 INJECTION, SOLUTION INTRAVENOUS AS NEEDED
Status: CANCELLED | OUTPATIENT
Start: 2023-03-08

## 2023-03-08 RX ORDER — HEPARIN 100 UNIT/ML
500 SYRINGE INTRAVENOUS AS NEEDED
Start: 2023-04-05

## 2023-03-08 RX ORDER — SODIUM CHLORIDE 9 MG/ML
5-250 INJECTION, SOLUTION INTRAVENOUS AS NEEDED
OUTPATIENT
Start: 2023-03-15

## 2023-03-08 RX ORDER — DIPHENHYDRAMINE HYDROCHLORIDE 50 MG/ML
50 INJECTION, SOLUTION INTRAMUSCULAR; INTRAVENOUS AS NEEDED
Start: 2023-03-22

## 2023-03-08 RX ORDER — SODIUM CHLORIDE 9 MG/ML
5-40 INJECTION INTRAVENOUS AS NEEDED
OUTPATIENT
Start: 2023-03-15

## 2023-03-08 RX ORDER — HYDROCORTISONE SODIUM SUCCINATE 100 MG/2ML
100 INJECTION, POWDER, FOR SOLUTION INTRAMUSCULAR; INTRAVENOUS AS NEEDED
OUTPATIENT
Start: 2023-03-15

## 2023-03-08 RX ORDER — ACETAMINOPHEN 325 MG/1
650 TABLET ORAL AS NEEDED
Status: DISCONTINUED | OUTPATIENT
Start: 2023-03-08 | End: 2023-03-09 | Stop reason: HOSPADM

## 2023-03-08 RX ORDER — SODIUM CHLORIDE 0.9 % (FLUSH) 0.9 %
5-40 SYRINGE (ML) INJECTION AS NEEDED
OUTPATIENT
Start: 2023-03-22

## 2023-03-08 RX ORDER — DIPHENHYDRAMINE HYDROCHLORIDE 50 MG/ML
50 INJECTION, SOLUTION INTRAMUSCULAR; INTRAVENOUS AS NEEDED
Status: CANCELLED
Start: 2023-03-08

## 2023-03-08 RX ORDER — SODIUM CHLORIDE 9 MG/ML
5-40 INJECTION INTRAVENOUS AS NEEDED
Status: CANCELLED | OUTPATIENT
Start: 2023-03-08

## 2023-03-08 RX ORDER — HYDROCORTISONE SODIUM SUCCINATE 100 MG/2ML
100 INJECTION, POWDER, FOR SOLUTION INTRAMUSCULAR; INTRAVENOUS AS NEEDED
OUTPATIENT
Start: 2023-03-22

## 2023-03-08 RX ORDER — HYDROCORTISONE SODIUM SUCCINATE 100 MG/2ML
100 INJECTION, POWDER, FOR SOLUTION INTRAMUSCULAR; INTRAVENOUS AS NEEDED
Status: CANCELLED | OUTPATIENT
Start: 2023-03-08

## 2023-03-08 RX ORDER — ONDANSETRON 2 MG/ML
8 INJECTION INTRAMUSCULAR; INTRAVENOUS AS NEEDED
OUTPATIENT
Start: 2023-03-22

## 2023-03-08 RX ORDER — HEPARIN 100 UNIT/ML
500 SYRINGE INTRAVENOUS AS NEEDED
Status: CANCELLED
Start: 2023-03-08

## 2023-03-08 RX ORDER — ACETAMINOPHEN 325 MG/1
650 TABLET ORAL AS NEEDED
Start: 2023-04-05

## 2023-03-08 RX ORDER — SODIUM CHLORIDE 0.9 % (FLUSH) 0.9 %
5-40 SYRINGE (ML) INJECTION AS NEEDED
Status: CANCELLED | OUTPATIENT
Start: 2023-03-08

## 2023-03-08 RX ORDER — SODIUM CHLORIDE 0.9 % (FLUSH) 0.9 %
5-40 SYRINGE (ML) INJECTION AS NEEDED
OUTPATIENT
Start: 2023-04-05

## 2023-03-08 RX ORDER — HYDROCORTISONE SODIUM SUCCINATE 100 MG/2ML
100 INJECTION, POWDER, FOR SOLUTION INTRAMUSCULAR; INTRAVENOUS AS NEEDED
OUTPATIENT
Start: 2023-03-29

## 2023-03-08 RX ORDER — SODIUM CHLORIDE 0.9 % (FLUSH) 0.9 %
5-40 SYRINGE (ML) INJECTION AS NEEDED
OUTPATIENT
Start: 2023-03-29

## 2023-03-08 RX ORDER — SODIUM CHLORIDE 9 MG/ML
5-250 INJECTION, SOLUTION INTRAVENOUS AS NEEDED
OUTPATIENT
Start: 2023-04-05

## 2023-03-08 RX ORDER — SODIUM CHLORIDE 9 MG/ML
5-40 INJECTION INTRAVENOUS AS NEEDED
OUTPATIENT
Start: 2023-03-22

## 2023-03-08 RX ORDER — EPINEPHRINE 1 MG/ML
0.3 INJECTION, SOLUTION, CONCENTRATE INTRAVENOUS AS NEEDED
OUTPATIENT
Start: 2023-03-29

## 2023-03-08 RX ORDER — SODIUM CHLORIDE 9 MG/ML
5-40 INJECTION INTRAVENOUS AS NEEDED
OUTPATIENT
Start: 2023-04-05

## 2023-03-08 RX ORDER — HEPARIN 100 UNIT/ML
500 SYRINGE INTRAVENOUS AS NEEDED
Start: 2023-03-29

## 2023-03-08 RX ORDER — ONDANSETRON 2 MG/ML
8 INJECTION INTRAMUSCULAR; INTRAVENOUS AS NEEDED
Status: DISCONTINUED | OUTPATIENT
Start: 2023-03-08 | End: 2023-03-09 | Stop reason: HOSPADM

## 2023-03-08 RX ORDER — ACETAMINOPHEN 325 MG/1
650 TABLET ORAL AS NEEDED
Start: 2023-03-29

## 2023-03-08 RX ORDER — ACETAMINOPHEN 325 MG/1
650 TABLET ORAL AS NEEDED
Start: 2023-03-15

## 2023-03-08 RX ORDER — ONDANSETRON 2 MG/ML
8 INJECTION INTRAMUSCULAR; INTRAVENOUS AS NEEDED
OUTPATIENT
Start: 2023-03-15

## 2023-03-08 RX ORDER — HEPARIN 100 UNIT/ML
500 SYRINGE INTRAVENOUS AS NEEDED
Start: 2023-03-22

## 2023-03-08 RX ORDER — ALBUTEROL SULFATE 0.83 MG/ML
2.5 SOLUTION RESPIRATORY (INHALATION) AS NEEDED
Start: 2023-03-29

## 2023-03-08 RX ORDER — EPINEPHRINE 1 MG/ML
0.3 INJECTION, SOLUTION, CONCENTRATE INTRAVENOUS AS NEEDED
OUTPATIENT
Start: 2023-03-15

## 2023-03-08 RX ORDER — SODIUM CHLORIDE 9 MG/ML
5-40 INJECTION INTRAVENOUS AS NEEDED
OUTPATIENT
Start: 2023-03-29

## 2023-03-08 RX ORDER — DIPHENHYDRAMINE HYDROCHLORIDE 50 MG/ML
25 INJECTION, SOLUTION INTRAMUSCULAR; INTRAVENOUS AS NEEDED
Start: 2023-03-29

## 2023-03-08 RX ORDER — EPINEPHRINE 1 MG/ML
0.3 INJECTION, SOLUTION, CONCENTRATE INTRAVENOUS AS NEEDED
Status: CANCELLED | OUTPATIENT
Start: 2023-03-08

## 2023-03-08 RX ADMIN — SODIUM CHLORIDE 25 ML/HR: 9 INJECTION, SOLUTION INTRAVENOUS at 15:30

## 2023-03-08 RX ADMIN — IRON SUCROSE 200 MG: 20 INJECTION, SOLUTION INTRAVENOUS at 15:34

## 2023-03-08 NOTE — PROGRESS NOTES
\[facility]\"" Progress Note    Date: 2023    Name: Philly Bravo    MRN: [id number]         : 1985    Ms. Tremaine Hennessy Arrived ambulatory and in no distress for Venofer Infusion. Assessment was completed, no acute issues at this time, no new complaints voiced. 24 G PIV established to Right arm, + blood return. Ms. Cornelia Walker vitals were reviewed. Visit Vitals  /61 (BP 1 Location: Right upper arm, BP Patient Position: Sitting)   Pulse 85   Temp 98 °F (36.7 °C)   Resp 16   SpO2 99%         Medications:  Medications Administered       0.9% sodium chloride infusion       Admin Date  2023 Action  New Bag Dose  25 mL/hr Rate  25 mL/hr Route  IntraVENous Administered By  Elmer Aguirre RN              iron sucrose (VENOFER) 200 mg in 0.9% sodium chloride 100 mL, overfill volume 10 mL IVPB       Admin Date  2023 Action  New Bag Dose  200 mg Rate  120 mL/hr Route  IntraVENous Administered By  Elmer Aguirre RN                    Infusion started @9098, @ 137 3367 pt complained of dizziness and lightheadedness. Infusion was promptly stopped, and NS was started. MD notified. Iron re-started @ 1601 per MD order. SBAR was given to GUERA Rueda @ 6953.      Joanne Hutchins RN  2023

## 2023-03-08 NOTE — PROGRESS NOTES
[facility] Progress Note    Date: 2023    Name: Ethan Cardenas    MRN: [id number]         : 1985    1630[facility] Patient Vitals for the past 12 hrs:   Temp Pulse Resp BP SpO2   23 1655 98.2 °F (36.8 °C) 72 -- 125/75 100 %   23 1559 -- -- -- 110/71 --   23 1550 98 °F (36.7 °C) 72 18 114/74 99 %   23 1520 98 °F (36.7 °C) 85 16 117/61 99 %     Ms. Gramajo tolerated treatment well and was discharged from [place] [address] in stable condition at 1705. PIV flushed and removed. Pressure dressing applied.      Future Appointments   Date Time Provider Holland Medina   3/15/2023  1:30 PM SS INF1 CH4 <2H [facility]   3/22/2023 11:30 AM SS INF1 CH4 <2H [facility]   3/29/2023 11:30 AM SS INF3 CH4 <2H [facility]   2023 10:00 AM SS INF1 CH4 <2H [facility]   5/3/2023  1:30 PM Shay Lyon MD [facility] BS AMB   2023 11:00 AM Leyla Conrad NP [facility] BS AMB         Isak Russell RN  2023

## 2023-03-15 ENCOUNTER — HOSPITAL ENCOUNTER (OUTPATIENT)
Dept: INFUSION THERAPY | Age: 38
Discharge: HOME | End: 2023-03-15
Payer: OTHER GOVERNMENT

## 2023-03-15 VITALS
SYSTOLIC BLOOD PRESSURE: 97 MMHG | WEIGHT: 165 LBS | RESPIRATION RATE: 16 BRPM | TEMPERATURE: 98.4 F | DIASTOLIC BLOOD PRESSURE: 72 MMHG | BODY MASS INDEX: 29.23 KG/M2 | OXYGEN SATURATION: 100 % | HEART RATE: 74 BPM | HEIGHT: 63 IN

## 2023-03-15 DIAGNOSIS — D50.8 OTHER IRON DEFICIENCY ANEMIA: ICD-10-CM

## 2023-03-15 DIAGNOSIS — R53.83 OTHER FATIGUE: ICD-10-CM

## 2023-03-15 DIAGNOSIS — D75.839 THROMBOCYTOSIS: ICD-10-CM

## 2023-03-15 DIAGNOSIS — R71.8 RBC MICROCYTOSIS: Primary | ICD-10-CM

## 2023-03-15 PROCEDURE — 96365 THER/PROPH/DIAG IV INF INIT: CPT

## 2023-03-15 PROCEDURE — 74011250636 HC RX REV CODE- 250/636: Performed by: INTERNAL MEDICINE

## 2023-03-15 PROCEDURE — 74011000258 HC RX REV CODE- 258: Performed by: INTERNAL MEDICINE

## 2023-03-15 RX ORDER — SODIUM CHLORIDE 9 MG/ML
5-250 INJECTION, SOLUTION INTRAVENOUS AS NEEDED
Status: DISCONTINUED | OUTPATIENT
Start: 2023-03-15 | End: 2023-03-16 | Stop reason: HOSPADM

## 2023-03-15 RX ORDER — SODIUM CHLORIDE 0.9 % (FLUSH) 0.9 %
5-40 SYRINGE (ML) INJECTION AS NEEDED
Status: DISCONTINUED | OUTPATIENT
Start: 2023-03-15 | End: 2023-03-16 | Stop reason: HOSPADM

## 2023-03-15 RX ORDER — HEPARIN 100 UNIT/ML
500 SYRINGE INTRAVENOUS AS NEEDED
Status: DISCONTINUED | OUTPATIENT
Start: 2023-03-15 | End: 2023-03-16 | Stop reason: HOSPADM

## 2023-03-15 RX ORDER — SODIUM CHLORIDE 9 MG/ML
5-40 INJECTION INTRAVENOUS AS NEEDED
Status: DISCONTINUED | OUTPATIENT
Start: 2023-03-15 | End: 2023-03-16 | Stop reason: HOSPADM

## 2023-03-15 RX ADMIN — IRON SUCROSE 200 MG: 20 INJECTION, SOLUTION INTRAVENOUS at 14:01

## 2023-03-15 NOTE — PROGRESS NOTES
Outpatient Infusion Center Progress Note        Date: March 15, 2023    Name: Jasmine Chinchilla    MRN: [id number]         : 1985    Ms. Stone Lundberg Arrived ambulatory and in no distress for Iron Sucrose Regimen . Assessment was completed, no acute issues at this time, no new complaints voiced. 24 gauge peripheral IV was placed in the left wrist without difficulty, positive for blood return. Ms. Rancho Johnston vitals were reviewed. Patient Vitals for the past 12 hrs:   Temp Pulse Resp BP SpO2   03/15/23 1513 -- 74 -- 97/72 100 %   03/15/23 1335 98.4 °F (36.9 °C) 88 16 (!) 148/94 98 %            Medications received:  Medications Administered       iron sucrose (VENOFER) 200 mg in 0.9% sodium chloride 100 mL, overfill volume 10 mL IVPB       Admin Date  03/15/2023 Action  New Bag Dose  200 mg Rate  120 mL/hr Route  IntraVENous Administered By  Mohamud Campos RN                     Ms. Stone Lundberg tolerated treatment well and was discharged from [place] [address] in stable condition at 1515. She is to return on  2023 at 1130 for her next appointment.     Max Carson RN  March 15, 2023    Future Appointments:  Future Appointments   Date Time Provider Holland Medina   3/22/2023 11:30 AM SS INF1 CH4 <2H [facility]   3/29/2023 11:30 [facility]   2023 10:00 AM [facility]   5/3/2023  1:30 PM Elinor Garza MD [facility] BS AMB   2023 11:00 AM Leyla Conrad NP [facility] BS AMB

## 2023-03-22 ENCOUNTER — HOSPITAL ENCOUNTER (OUTPATIENT)
Dept: INFUSION THERAPY | Age: 38
Discharge: HOME | End: 2023-03-22
Payer: OTHER GOVERNMENT

## 2023-03-22 VITALS
DIASTOLIC BLOOD PRESSURE: 81 MMHG | TEMPERATURE: 98 F | RESPIRATION RATE: 16 BRPM | OXYGEN SATURATION: 98 % | HEART RATE: 69 BPM | SYSTOLIC BLOOD PRESSURE: 118 MMHG

## 2023-03-22 DIAGNOSIS — R71.8 RBC MICROCYTOSIS: Primary | ICD-10-CM

## 2023-03-22 DIAGNOSIS — D50.8 OTHER IRON DEFICIENCY ANEMIA: ICD-10-CM

## 2023-03-22 DIAGNOSIS — R53.83 OTHER FATIGUE: ICD-10-CM

## 2023-03-22 DIAGNOSIS — D75.839 THROMBOCYTOSIS: ICD-10-CM

## 2023-03-22 PROCEDURE — 96365 THER/PROPH/DIAG IV INF INIT: CPT

## 2023-03-22 PROCEDURE — 74011000258 HC RX REV CODE- 258: Performed by: INTERNAL MEDICINE

## 2023-03-22 PROCEDURE — 74011250636 HC RX REV CODE- 250/636: Performed by: INTERNAL MEDICINE

## 2023-03-22 RX ORDER — HEPARIN 100 UNIT/ML
500 SYRINGE INTRAVENOUS AS NEEDED
Status: ACTIVE | OUTPATIENT
Start: 2023-03-22 | End: 2023-03-22

## 2023-03-22 RX ADMIN — IRON SUCROSE 200 MG: 20 INJECTION, SOLUTION INTRAVENOUS at 12:05

## 2023-03-22 NOTE — PROGRESS NOTES
[facility] Progress Note    Date: 2023    Name: Enedelia Lamb    MRN: [id number]         : 1985    Ms. Azucena Jacobs Arrived ambulatory and in no distress for Venofer Infusion. Assessment was completed, no acute issues at this time, no new complaints voiced. 24 G PIV established to right hand, + blood return. Ms. Thao Resendiz vitals were reviewed. Visit Vitals  /81   Pulse 69   Temp 98 °F (36.7 °C)   Resp 16   SpO2 98%         Medications:  Medications Administered       iron sucrose (VENOFER) 200 mg in 0.9% sodium chloride 100 mL, overfill volume 10 mL IVPB       Admin Date  2023 Action  New Bag Dose  200 mg Rate  120 mL/hr Route  IntraVENous Administered By  Prema Pitts RN                      Ms. Azucena Jacobs tolerated treatment well and was discharged from [place] [address] in stable condition. PIV flushed & removed. She is to return on  at 1130 for her next appointment.     Katina Sun RN  2023

## 2023-03-29 ENCOUNTER — HOSPITAL ENCOUNTER (OUTPATIENT)
Dept: INFUSION THERAPY | Age: 38
Discharge: HOME | End: 2023-03-29
Payer: OTHER GOVERNMENT

## 2023-03-29 VITALS
TEMPERATURE: 97.7 F | SYSTOLIC BLOOD PRESSURE: 114 MMHG | HEART RATE: 74 BPM | RESPIRATION RATE: 16 BRPM | DIASTOLIC BLOOD PRESSURE: 73 MMHG

## 2023-03-29 DIAGNOSIS — D75.839 THROMBOCYTOSIS: ICD-10-CM

## 2023-03-29 DIAGNOSIS — D50.8 OTHER IRON DEFICIENCY ANEMIA: ICD-10-CM

## 2023-03-29 DIAGNOSIS — R53.83 OTHER FATIGUE: ICD-10-CM

## 2023-03-29 DIAGNOSIS — R71.8 RBC MICROCYTOSIS: Primary | ICD-10-CM

## 2023-03-29 PROCEDURE — 74011000258 HC RX REV CODE- 258: Performed by: INTERNAL MEDICINE

## 2023-03-29 PROCEDURE — 74011250636 HC RX REV CODE- 250/636: Performed by: INTERNAL MEDICINE

## 2023-03-29 PROCEDURE — 96365 THER/PROPH/DIAG IV INF INIT: CPT

## 2023-03-29 RX ORDER — SODIUM CHLORIDE 9 MG/ML
5-250 INJECTION, SOLUTION INTRAVENOUS AS NEEDED
Status: DISPENSED
Start: 2023-03-29 | End: 2023-03-29

## 2023-03-29 RX ADMIN — IRON SUCROSE 200 MG: 20 INJECTION, SOLUTION INTRAVENOUS at 11:55

## 2023-03-29 RX ADMIN — SODIUM CHLORIDE 25 ML/HR: 9 INJECTION, SOLUTION INTRAVENOUS at 11:42

## 2023-03-29 NOTE — PROGRESS NOTES
[facility] Progress Note    Date: 2023    Name: Jono Gonzalez    MRN: [id number]         : 1985    Ms. Manjit Ruiz Arrived ambulatory and in no distress for Venofer 4/5 Regimen. Assessment was completed, no acute issues at this time, patient reports chronic fatigue, generalized weakness, recent fall. 24g PIV placed in right AC, positive blood return noted. No labs drawn today. Ms. Ibrahima Marroquin vitals were reviewed. Patient Vitals for the past 12 hrs:   Temp Pulse Resp BP   23 1327 -- 74 -- 114/73   23 1133 97.7 °F (36.5 °C) 81 16 118/73         Medications:  Medications Administered       0.9% sodium chloride infusion       Admin Date  2023 Action  New Bag Dose  25 mL/hr Rate  25 mL/hr Route  IntraVENous Administered By  Selene Burton, RN              iron sucrose (VENOFER) 200 mg in 0.9% sodium chloride 100 mL, overfill volume 10 mL IVPB       Admin Date  2023 Action  New Bag Dose  200 mg Rate  120 mL/hr Route  IntraVENous Administered By  Selene Burton, RN                  Patient completed 30 minute observation period post infusion. No s/sx of reaction noted at the time of discharge. Ms. Manjit Ruiz tolerated treatment well and was discharged from [place] [address] in stable condition. PIV flushed and removed. Pressure dressing applied.      Future Appointments   Date Time Provider Holland Tubbsi   3/29/2023 11:30 AM [facility]   2023 10:00 AM [facility]   5/3/2023  1:30 PM Candice Moore MD [facility] BS AMB   2023 11:00 AM Leyla Conrad, NP [facility] BS AMB       Yuly Tucker RN  2023

## 2023-04-05 ENCOUNTER — HOSPITAL ENCOUNTER (OUTPATIENT)
Dept: INFUSION THERAPY | Age: 38
End: 2023-04-05
Payer: OTHER GOVERNMENT

## 2023-04-05 LAB
BASOPHILS # BLD: 0.1 K/UL (ref 0–0.1)
BASOPHILS NFR BLD: 1 % (ref 0–1)
DIFFERENTIAL METHOD BLD: ABNORMAL
EOSINOPHIL # BLD: 0 K/UL (ref 0–0.4)
EOSINOPHIL NFR BLD: 0 % (ref 0–7)
FERRITIN SERPL-MCNC: 497 NG/ML (ref 8–252)
HCT VFR BLD AUTO: 39.1 % (ref 35–47)
HGB BLD-MCNC: 12.5 G/DL (ref 11.5–16)
IMM GRANULOCYTES # BLD AUTO: 0.1 K/UL (ref 0–0.04)
IMM GRANULOCYTES NFR BLD AUTO: 1 % (ref 0–0.5)
IRON SATN MFR SERPL: 87 % (ref 20–50)
IRON SERPL-MCNC: 553 UG/DL (ref 35–150)
LYMPHOCYTES # BLD: 2 K/UL (ref 0.8–3.5)
LYMPHOCYTES NFR BLD: 16 % (ref 12–49)
MCH RBC QN AUTO: 24.4 PG (ref 26–34)
MCHC RBC AUTO-ENTMCNC: 32 G/DL (ref 30–36.5)
MCV RBC AUTO: 76.2 FL (ref 80–99)
MONOCYTES # BLD: 0.8 K/UL (ref 0–1)
MONOCYTES NFR BLD: 6 % (ref 5–13)
NEUTS SEG # BLD: 9.5 K/UL (ref 1.8–8)
NEUTS SEG NFR BLD: 76 % (ref 32–75)
NRBC # BLD: 0 K/UL (ref 0–0.01)
NRBC BLD-RTO: 0 PER 100 WBC
PLATELET # BLD AUTO: 396 K/UL (ref 150–400)
RBC # BLD AUTO: 5.13 M/UL (ref 3.8–5.2)
RBC MORPH BLD: ABNORMAL
TIBC SERPL-MCNC: 636 UG/DL (ref 250–450)
WBC # BLD AUTO: 12.5 K/UL (ref 3.6–11)

## 2023-04-05 PROCEDURE — 82728 ASSAY OF FERRITIN: CPT

## 2023-04-05 PROCEDURE — 36415 COLL VENOUS BLD VENIPUNCTURE: CPT

## 2023-04-05 PROCEDURE — 83540 ASSAY OF IRON: CPT

## 2023-04-05 PROCEDURE — 96365 THER/PROPH/DIAG IV INF INIT: CPT

## 2023-04-05 PROCEDURE — 74011250636 HC RX REV CODE- 250/636: Performed by: INTERNAL MEDICINE

## 2023-04-05 PROCEDURE — 85025 COMPLETE CBC W/AUTO DIFF WBC: CPT

## 2023-04-05 PROCEDURE — 74011000258 HC RX REV CODE- 258: Performed by: INTERNAL MEDICINE

## 2023-04-05 RX ORDER — HEPARIN 100 UNIT/ML
500 SYRINGE INTRAVENOUS AS NEEDED
Status: ACTIVE
Start: 2023-04-05 | End: 2023-04-05

## 2023-04-05 RX ORDER — SODIUM CHLORIDE 0.9 % (FLUSH) 0.9 %
5-40 SYRINGE (ML) INJECTION AS NEEDED
Status: DISPENSED
Start: 2023-04-05 | End: 2023-04-05

## 2023-04-05 RX ORDER — SODIUM CHLORIDE 9 MG/ML
5-250 INJECTION, SOLUTION INTRAVENOUS AS NEEDED
Status: DISPENSED
Start: 2023-04-05 | End: 2023-04-05

## 2023-04-05 RX ORDER — SODIUM CHLORIDE 9 MG/ML
5-40 INJECTION INTRAVENOUS AS NEEDED
Status: ACTIVE
Start: 2023-04-05 | End: 2023-04-05

## 2023-04-05 RX ADMIN — IRON SUCROSE 200 MG: 20 INJECTION, SOLUTION INTRAVENOUS at 11:14

## 2023-04-05 NOTE — PROGRESS NOTES
[facility] Progress Note    Date: 2023    Name: Tanner Mcbride    MRN: [id number]         : 1985    Ms. Radha Aly Arrived ambulatory and in no distress for Venofer Infusion. Assessment was completed, no acute issues at this time, patient reports ongoing fatigue. 24 G PIV established to right arm, + blood return. Labs drawn and sent for processing. Ms. Robyn Pereira vitals were reviewed. Visit Vitals  /68   Pulse 99   Temp 98.9 °F (37.2 °C)   SpO2 95%       Lab results were obtained and reviewed. Recent Results (from the past 12 hour(s))   CBC WITH AUTOMATED DIFF    Collection Time: 23 10:52 AM   Result Value Ref Range    WBC 12.5 (H) 3.6 - 11.0 K/uL    RBC 5.13 3.80 - 5.20 M/uL    HGB 12.5 11.5 - 16.0 g/dL    HCT 39.1 35.0 - 47.0 %    MCV 76.2 (L) 80.0 - 99.0 FL    MCH 24.4 (L) 26.0 - 34.0 PG    MCHC 32.0 30.0 - 36.5 g/dL    PLATELET 219 035 - 126 K/uL    NRBC 0.0 0  WBC    ABSOLUTE NRBC 0.00 0.00 - 0.01 K/uL    NEUTROPHILS 76 (H) 32 - 75 %    LYMPHOCYTES 16 12 - 49 %    MONOCYTES 6 5 - 13 %    EOSINOPHILS 0 0 - 7 %    BASOPHILS 1 0 - 1 %    IMMATURE GRANULOCYTES 1 (H) 0.0 - 0.5 %    ABS. NEUTROPHILS 9.5 (H) 1.8 - 8.0 K/UL    ABS. LYMPHOCYTES 2.0 0.8 - 3.5 K/UL    ABS. MONOCYTES 0.8 0.0 - 1.0 K/UL    ABS. EOSINOPHILS 0.0 0.0 - 0.4 K/UL    ABS. BASOPHILS 0.1 0.0 - 0.1 K/UL    ABS. IMM. GRANS. 0.1 (H) 0.00 - 0.04 K/UL    DF SMEAR SCANNED      RBC COMMENTS TARGET CELLS  PRESENT        RBC COMMENTS Dimorphic RBCs  SCHISTOCYTES  PRESENT        RBC COMMENTS HYPOCHROMIA  1+         Lab called to report ferritin and iron profile specimens have hemolyzed. Samples redrawn prior to patient discharge and sent for processing.      Medications:  Medications Administered       iron sucrose (VENOFER) 200 mg in 0.9% sodium chloride 100 mL, overfill volume 10 mL IVPB       Admin Date  2023 Action  New Bag Dose  200 mg Rate  120 mL/hr Route  IntraVENous Administered By  Jin Prasad RN Ms. Benjamin Cabral tolerated treatment well and was discharged from [place] [address] in stable condition. PIV flushed & removed. She is aware of future appointments.     Future Appointments   Date Time Provider Holland Tubbsi   5/3/2023  1:30 PM Es Gunderson MD [facility] BS AMB   7/28/2023 11:00 AM Leyla Conrad NP [facility] BS AMB       Oswaldo Jo RN  April 5, 2023

## 2023-04-06 ENCOUNTER — PATIENT MESSAGE (OUTPATIENT)
Dept: ONCOLOGY | Age: 38
End: 2023-04-06

## 2023-04-06 NOTE — PROGRESS NOTES
Iron and hemoglobin significantly improved. No further need for IV iron treatment at this time. Please follow-up with primary care regarding other causes of your fatigue.

## 2023-04-07 ENCOUNTER — TELEPHONE (OUTPATIENT)
Dept: ONCOLOGY | Age: 38
End: 2023-04-07

## 2023-04-08 ENCOUNTER — APPOINTMENT (OUTPATIENT)
Dept: CT IMAGING | Age: 38
DRG: 392 | End: 2023-04-08
Attending: STUDENT IN AN ORGANIZED HEALTH CARE EDUCATION/TRAINING PROGRAM
Payer: OTHER GOVERNMENT

## 2023-04-08 ENCOUNTER — APPOINTMENT (OUTPATIENT)
Dept: GENERAL RADIOLOGY | Age: 38
DRG: 392 | End: 2023-04-08
Attending: PHYSICIAN ASSISTANT
Payer: OTHER GOVERNMENT

## 2023-04-08 ENCOUNTER — HOSPITAL ENCOUNTER (INPATIENT)
Age: 38
LOS: 3 days | Discharge: HOME | DRG: 392 | End: 2023-04-11
Attending: STUDENT IN AN ORGANIZED HEALTH CARE EDUCATION/TRAINING PROGRAM | Admitting: FAMILY MEDICINE
Payer: OTHER GOVERNMENT

## 2023-04-08 ENCOUNTER — APPOINTMENT (OUTPATIENT)
Dept: CT IMAGING | Age: 38
DRG: 392 | End: 2023-04-08
Attending: PHYSICIAN ASSISTANT
Payer: OTHER GOVERNMENT

## 2023-04-08 DIAGNOSIS — R77.8 ELEVATED TROPONIN: ICD-10-CM

## 2023-04-08 DIAGNOSIS — D50.9 IRON DEFICIENCY ANEMIA, UNSPECIFIED IRON DEFICIENCY ANEMIA TYPE: ICD-10-CM

## 2023-04-08 DIAGNOSIS — E83.19 IRON OVERLOAD: ICD-10-CM

## 2023-04-08 DIAGNOSIS — A41.9 SEPSIS WITH ACUTE ORGAN DYSFUNCTION WITHOUT SEPTIC SHOCK, DUE TO UNSPECIFIED ORGANISM, UNSPECIFIED TYPE (HCC): Primary | ICD-10-CM

## 2023-04-08 DIAGNOSIS — K20.90 ESOPHAGITIS: ICD-10-CM

## 2023-04-08 DIAGNOSIS — I10 PRIMARY HYPERTENSION: ICD-10-CM

## 2023-04-08 DIAGNOSIS — E87.29 INCREASED ANION GAP METABOLIC ACIDOSIS: ICD-10-CM

## 2023-04-08 DIAGNOSIS — I10 HYPERTENSION, UNSPECIFIED TYPE: ICD-10-CM

## 2023-04-08 DIAGNOSIS — F12.90 USE OF CANNABINOID EDIBLES: ICD-10-CM

## 2023-04-08 DIAGNOSIS — E87.20 ACIDOSIS: ICD-10-CM

## 2023-04-08 DIAGNOSIS — R65.20 SEPSIS WITH ACUTE ORGAN DYSFUNCTION WITHOUT SEPTIC SHOCK, DUE TO UNSPECIFIED ORGANISM, UNSPECIFIED TYPE (HCC): Primary | ICD-10-CM

## 2023-04-08 DIAGNOSIS — R13.10 ODYNOPHAGIA: ICD-10-CM

## 2023-04-08 DIAGNOSIS — Q89.01 SPLEEN ABSENT: ICD-10-CM

## 2023-04-08 DIAGNOSIS — R07.9 CHEST PAIN, UNSPECIFIED TYPE: ICD-10-CM

## 2023-04-08 DIAGNOSIS — R71.8 RBC MICROCYTOSIS: ICD-10-CM

## 2023-04-08 DIAGNOSIS — D50.8 OTHER IRON DEFICIENCY ANEMIA: ICD-10-CM

## 2023-04-08 PROBLEM — R79.89 ELEVATED TROPONIN: Status: ACTIVE | Noted: 2023-04-08

## 2023-04-08 LAB
ABO + RH BLD: NORMAL
ALBUMIN SERPL-MCNC: 4.7 G/DL (ref 3.5–5)
ALBUMIN/GLOB SERPL: 0.9 (ref 1.1–2.2)
ALP SERPL-CCNC: 115 U/L (ref 45–117)
ALT SERPL-CCNC: 36 U/L (ref 12–78)
AMPHET UR QL SCN: NEGATIVE
ANION GAP SERPL CALC-SCNC: 13 MMOL/L (ref 5–15)
ANION GAP SERPL CALC-SCNC: 13 MMOL/L (ref 5–15)
ANION GAP SERPL CALC-SCNC: 19 MMOL/L (ref 5–15)
APPEARANCE UR: ABNORMAL
APTT PPP: 26.9 SEC (ref 22.1–31)
AST SERPL-CCNC: 33 U/L (ref 15–37)
BACTERIA URNS QL MICRO: ABNORMAL /HPF
BARBITURATES UR QL SCN: NEGATIVE
BASE DEFICIT BLDV-SCNC: 10 MMOL/L
BASOPHILS # BLD: 0 K/UL (ref 0–0.1)
BASOPHILS NFR BLD: 0 % (ref 0–1)
BDY SITE: ABNORMAL
BENZODIAZ UR QL: NEGATIVE
BILIRUB SERPL-MCNC: 1.6 MG/DL (ref 0.2–1)
BILIRUB UR QL: NEGATIVE
BLOOD GROUP ANTIBODIES SERPL: NORMAL
BUN SERPL-MCNC: 23 MG/DL (ref 6–20)
BUN SERPL-MCNC: 27 MG/DL (ref 6–20)
BUN SERPL-MCNC: 32 MG/DL (ref 6–20)
BUN/CREAT SERPL: 21 (ref 12–20)
BUN/CREAT SERPL: 22 (ref 12–20)
BUN/CREAT SERPL: 23 (ref 12–20)
CALCIUM SERPL-MCNC: 10.8 MG/DL (ref 8.5–10.1)
CALCIUM SERPL-MCNC: 9.2 MG/DL (ref 8.5–10.1)
CALCIUM SERPL-MCNC: 9.5 MG/DL (ref 8.5–10.1)
CANNABINOIDS UR QL SCN: POSITIVE
CAOX CRY URNS QL MICRO: ABNORMAL
CHLORIDE SERPL-SCNC: 108 MMOL/L (ref 97–108)
CHLORIDE SERPL-SCNC: 111 MMOL/L (ref 97–108)
CHLORIDE SERPL-SCNC: 99 MMOL/L (ref 97–108)
CK SERPL-CCNC: 127 U/L (ref 26–192)
CO2 SERPL-SCNC: 15 MMOL/L (ref 21–32)
CO2 SERPL-SCNC: 15 MMOL/L (ref 21–32)
CO2 SERPL-SCNC: 16 MMOL/L (ref 21–32)
COCAINE UR QL SCN: NEGATIVE
COLOR UR: ABNORMAL
COMMENT, HOLDF: NORMAL
CREAT SERPL-MCNC: 1.06 MG/DL (ref 0.55–1.02)
CREAT SERPL-MCNC: 1.15 MG/DL (ref 0.55–1.02)
CREAT SERPL-MCNC: 1.49 MG/DL (ref 0.55–1.02)
DEPRECATED S PYO AG THROAT QL EIA: NEGATIVE
DIFFERENTIAL METHOD BLD: ABNORMAL
DRUG SCRN COMMENT,DRGCM: ABNORMAL
EOSINOPHIL # BLD: 0 K/UL (ref 0–0.4)
EOSINOPHIL NFR BLD: 0 % (ref 0–7)
EPITH CASTS URNS QL MICRO: ABNORMAL /LPF
ERYTHROCYTE [DISTWIDTH] IN BLOOD BY AUTOMATED COUNT: 30.5 % (ref 11.5–14.5)
ETHANOL SERPL-MCNC: <10 MG/DL
FLUAV AG NPH QL IA: NEGATIVE
FLUBV AG NOSE QL IA: NEGATIVE
GLOBULIN SER CALC-MCNC: 5.1 G/DL (ref 2–4)
GLUCOSE SERPL-MCNC: 136 MG/DL (ref 65–100)
GLUCOSE SERPL-MCNC: 143 MG/DL (ref 65–100)
GLUCOSE SERPL-MCNC: 200 MG/DL (ref 65–100)
GLUCOSE UR STRIP.AUTO-MCNC: NEGATIVE MG/DL
HCG SERPL QL: NEGATIVE
HCO3 BLDV-SCNC: 13 MMOL/L (ref 23–28)
HCT VFR BLD AUTO: 46.7 % (ref 35–47)
HGB BLD-MCNC: 14.8 G/DL (ref 11.5–16)
HGB UR QL STRIP: ABNORMAL
HYALINE CASTS URNS QL MICRO: ABNORMAL /LPF (ref 0–5)
IMM GRANULOCYTES # BLD AUTO: 0 K/UL (ref 0–0.04)
IMM GRANULOCYTES NFR BLD AUTO: 0 % (ref 0–0.5)
INR PPP: 1 (ref 0.9–1.1)
KETONES UR QL STRIP.AUTO: >80 MG/DL
LACTATE BLD-SCNC: 0.99 MMOL/L (ref 0.4–2)
LEUKOCYTE ESTERASE UR QL STRIP.AUTO: NEGATIVE
LIPASE SERPL-CCNC: 119 U/L (ref 73–393)
LYMPHOCYTES # BLD: 1.3 K/UL (ref 0.8–3.5)
LYMPHOCYTES NFR BLD: 5 % (ref 12–49)
MCH RBC QN AUTO: 24.7 PG (ref 26–34)
MCHC RBC AUTO-ENTMCNC: 31.7 G/DL (ref 30–36.5)
MCV RBC AUTO: 78 FL (ref 80–99)
METHADONE UR QL: NEGATIVE
MONOCYTES # BLD: 2 K/UL (ref 0–1)
MONOCYTES NFR BLD: 8 % (ref 5–13)
NEUTS SEG # BLD: 21.9 K/UL (ref 1.8–8)
NEUTS SEG NFR BLD: 87 % (ref 32–75)
NITRITE UR QL STRIP.AUTO: NEGATIVE
NRBC # BLD: 0 K/UL (ref 0–0.01)
NRBC BLD-RTO: 0 PER 100 WBC
OPIATES UR QL: NEGATIVE
PCO2 BLDV: 22.4 MMHG (ref 41–51)
PCP UR QL: NEGATIVE
PH BLDV: 7.38 (ref 7.32–7.42)
PH UR STRIP: 6 (ref 5–8)
PLATELET # BLD AUTO: 407 K/UL (ref 150–400)
PMV BLD AUTO: 9.8 FL (ref 8.9–12.9)
PO2 BLDV: 148 MMHG (ref 25–40)
POTASSIUM SERPL-SCNC: 3.3 MMOL/L (ref 3.5–5.1)
POTASSIUM SERPL-SCNC: 3.5 MMOL/L (ref 3.5–5.1)
POTASSIUM SERPL-SCNC: 3.7 MMOL/L (ref 3.5–5.1)
PROCALCITONIN SERPL-MCNC: 0.1 NG/ML
PROT SERPL-MCNC: 9.8 G/DL (ref 6.4–8.2)
PROT UR STRIP-MCNC: 300 MG/DL
PROTHROMBIN TIME: 10.8 SEC (ref 9–11.1)
RBC # BLD AUTO: 5.99 M/UL (ref 3.8–5.2)
RBC #/AREA URNS HPF: ABNORMAL /HPF (ref 0–5)
RBC MORPH BLD: ABNORMAL
SALICYLATES SERPL-MCNC: <1.7 MG/DL (ref 2.8–20)
SAMPLES BEING HELD,HOLD: NORMAL
SAO2 % BLDV: 99 % (ref 65–88)
SAO2% DEVICE SAO2% SENSOR NAME: ABNORMAL
SARS-COV-2 RDRP RESP QL NAA+PROBE: NOT DETECTED
SODIUM SERPL-SCNC: 133 MMOL/L (ref 136–145)
SODIUM SERPL-SCNC: 137 MMOL/L (ref 136–145)
SODIUM SERPL-SCNC: 139 MMOL/L (ref 136–145)
SOURCE, COVRS: NORMAL
SP GR UR REFRACTOMETRY: 1.02 (ref 1–1.03)
SPECIMEN EXP DATE BLD: NORMAL
SPECIMEN SITE: ABNORMAL
THERAPEUTIC RANGE,PTTT: NORMAL SECS (ref 58–77)
TROPONIN I SERPL HS-MCNC: 63 NG/L (ref 0–51)
TROPONIN I SERPL HS-MCNC: 68 NG/L (ref 0–51)
TROPONIN I SERPL HS-MCNC: 76 NG/L (ref 0–51)
UR CULT HOLD, URHOLD: NORMAL
UROBILINOGEN UR QL STRIP.AUTO: 1 EU/DL (ref 0.2–1)
WBC # BLD AUTO: 25.2 K/UL (ref 3.6–11)
WBC URNS QL MICRO: ABNORMAL /HPF (ref 0–4)

## 2023-04-08 PROCEDURE — 99285 EMERGENCY DEPT VISIT HI MDM: CPT

## 2023-04-08 PROCEDURE — 80179 DRUG ASSAY SALICYLATE: CPT

## 2023-04-08 PROCEDURE — 94761 N-INVAS EAR/PLS OXIMETRY MLT: CPT

## 2023-04-08 PROCEDURE — C9113 INJ PANTOPRAZOLE SODIUM, VIA: HCPCS | Performed by: STUDENT IN AN ORGANIZED HEALTH CARE EDUCATION/TRAINING PROGRAM

## 2023-04-08 PROCEDURE — 65270000046 HC RM TELEMETRY

## 2023-04-08 PROCEDURE — 80053 COMPREHEN METABOLIC PANEL: CPT

## 2023-04-08 PROCEDURE — 84145 PROCALCITONIN (PCT): CPT

## 2023-04-08 PROCEDURE — 74011000636 HC RX REV CODE- 636: Performed by: STUDENT IN AN ORGANIZED HEALTH CARE EDUCATION/TRAINING PROGRAM

## 2023-04-08 PROCEDURE — 82570 ASSAY OF URINE CREATININE: CPT

## 2023-04-08 PROCEDURE — 82803 BLOOD GASES ANY COMBINATION: CPT

## 2023-04-08 PROCEDURE — 85730 THROMBOPLASTIN TIME PARTIAL: CPT

## 2023-04-08 PROCEDURE — 87040 BLOOD CULTURE FOR BACTERIA: CPT

## 2023-04-08 PROCEDURE — 85610 PROTHROMBIN TIME: CPT

## 2023-04-08 PROCEDURE — 74011250636 HC RX REV CODE- 250/636: Performed by: STUDENT IN AN ORGANIZED HEALTH CARE EDUCATION/TRAINING PROGRAM

## 2023-04-08 PROCEDURE — 74011250636 HC RX REV CODE- 250/636: Performed by: PHYSICIAN ASSISTANT

## 2023-04-08 PROCEDURE — 83690 ASSAY OF LIPASE: CPT

## 2023-04-08 PROCEDURE — 86900 BLOOD TYPING SEROLOGIC ABO: CPT

## 2023-04-08 PROCEDURE — 87086 URINE CULTURE/COLONY COUNT: CPT

## 2023-04-08 PROCEDURE — 87804 INFLUENZA ASSAY W/OPTIC: CPT

## 2023-04-08 PROCEDURE — 83540 ASSAY OF IRON: CPT

## 2023-04-08 PROCEDURE — 71275 CT ANGIOGRAPHY CHEST: CPT

## 2023-04-08 PROCEDURE — 84703 CHORIONIC GONADOTROPIN ASSAY: CPT

## 2023-04-08 PROCEDURE — 71046 X-RAY EXAM CHEST 2 VIEWS: CPT

## 2023-04-08 PROCEDURE — 96374 THER/PROPH/DIAG INJ IV PUSH: CPT

## 2023-04-08 PROCEDURE — 74177 CT ABD & PELVIS W/CONTRAST: CPT

## 2023-04-08 PROCEDURE — 83605 ASSAY OF LACTIC ACID: CPT

## 2023-04-08 PROCEDURE — 84300 ASSAY OF URINE SODIUM: CPT

## 2023-04-08 PROCEDURE — 74011000250 HC RX REV CODE- 250: Performed by: PHYSICIAN ASSISTANT

## 2023-04-08 PROCEDURE — 36415 COLL VENOUS BLD VENIPUNCTURE: CPT

## 2023-04-08 PROCEDURE — 82077 ASSAY SPEC XCP UR&BREATH IA: CPT

## 2023-04-08 PROCEDURE — 80320 DRUG SCREEN QUANTALCOHOLS: CPT

## 2023-04-08 PROCEDURE — 96375 TX/PRO/DX INJ NEW DRUG ADDON: CPT

## 2023-04-08 PROCEDURE — 87880 STREP A ASSAY W/OPTIC: CPT

## 2023-04-08 PROCEDURE — 85025 COMPLETE CBC W/AUTO DIFF WBC: CPT

## 2023-04-08 PROCEDURE — 80048 BASIC METABOLIC PNL TOTAL CA: CPT

## 2023-04-08 PROCEDURE — 74011250637 HC RX REV CODE- 250/637: Performed by: STUDENT IN AN ORGANIZED HEALTH CARE EDUCATION/TRAINING PROGRAM

## 2023-04-08 PROCEDURE — 81001 URINALYSIS AUTO W/SCOPE: CPT

## 2023-04-08 PROCEDURE — 82550 ASSAY OF CK (CPK): CPT

## 2023-04-08 PROCEDURE — 87635 SARS-COV-2 COVID-19 AMP PRB: CPT

## 2023-04-08 PROCEDURE — 80307 DRUG TEST PRSMV CHEM ANLYZR: CPT

## 2023-04-08 PROCEDURE — 74011000250 HC RX REV CODE- 250: Performed by: STUDENT IN AN ORGANIZED HEALTH CARE EDUCATION/TRAINING PROGRAM

## 2023-04-08 PROCEDURE — 74011250637 HC RX REV CODE- 250/637: Performed by: PHYSICIAN ASSISTANT

## 2023-04-08 PROCEDURE — 87070 CULTURE OTHR SPECIMN AEROBIC: CPT

## 2023-04-08 PROCEDURE — 93005 ELECTROCARDIOGRAM TRACING: CPT

## 2023-04-08 PROCEDURE — 84484 ASSAY OF TROPONIN QUANT: CPT

## 2023-04-08 PROCEDURE — 74011000636 HC RX REV CODE- 636: Performed by: FAMILY MEDICINE

## 2023-04-08 RX ORDER — ONDANSETRON 2 MG/ML
4 INJECTION INTRAMUSCULAR; INTRAVENOUS
Status: DISCONTINUED | OUTPATIENT
Start: 2023-04-08 | End: 2023-04-11 | Stop reason: HOSPADM

## 2023-04-08 RX ORDER — ONDANSETRON 2 MG/ML
4 INJECTION INTRAMUSCULAR; INTRAVENOUS
Status: COMPLETED | OUTPATIENT
Start: 2023-04-08 | End: 2023-04-08

## 2023-04-08 RX ORDER — POLYETHYLENE GLYCOL 3350 17 G/17G
17 POWDER, FOR SOLUTION ORAL DAILY PRN
Status: DISCONTINUED | OUTPATIENT
Start: 2023-04-08 | End: 2023-04-11 | Stop reason: HOSPADM

## 2023-04-08 RX ORDER — ONDANSETRON 4 MG/1
4 TABLET, ORALLY DISINTEGRATING ORAL
Status: DISCONTINUED | OUTPATIENT
Start: 2023-04-08 | End: 2023-04-11 | Stop reason: HOSPADM

## 2023-04-08 RX ORDER — FAMOTIDINE 10 MG/ML
20 INJECTION INTRAVENOUS
Status: COMPLETED | OUTPATIENT
Start: 2023-04-08 | End: 2023-04-08

## 2023-04-08 RX ORDER — METOCLOPRAMIDE HYDROCHLORIDE 5 MG/ML
10 INJECTION INTRAMUSCULAR; INTRAVENOUS
Status: COMPLETED | OUTPATIENT
Start: 2023-04-08 | End: 2023-04-08

## 2023-04-08 RX ORDER — PROCHLORPERAZINE EDISYLATE 5 MG/ML
10 INJECTION INTRAMUSCULAR; INTRAVENOUS ONCE
Status: COMPLETED | OUTPATIENT
Start: 2023-04-09 | End: 2023-04-09

## 2023-04-08 RX ORDER — METRONIDAZOLE 500 MG/100ML
500 INJECTION, SOLUTION INTRAVENOUS EVERY 12 HOURS
Status: DISCONTINUED | OUTPATIENT
Start: 2023-04-08 | End: 2023-04-11 | Stop reason: HOSPADM

## 2023-04-08 RX ORDER — DIPHENHYDRAMINE HYDROCHLORIDE 50 MG/ML
12.5 INJECTION, SOLUTION INTRAMUSCULAR; INTRAVENOUS
Status: COMPLETED | OUTPATIENT
Start: 2023-04-08 | End: 2023-04-08

## 2023-04-08 RX ORDER — ENOXAPARIN SODIUM 100 MG/ML
40 INJECTION SUBCUTANEOUS EVERY 24 HOURS
Status: DISCONTINUED | OUTPATIENT
Start: 2023-04-08 | End: 2023-04-08

## 2023-04-08 RX ORDER — ONDANSETRON 4 MG/1
4 TABLET, ORALLY DISINTEGRATING ORAL
Status: DISCONTINUED | OUTPATIENT
Start: 2023-04-08 | End: 2023-04-08

## 2023-04-08 RX ORDER — ACETAMINOPHEN 500 MG
1000 TABLET ORAL ONCE
Status: COMPLETED | OUTPATIENT
Start: 2023-04-08 | End: 2023-04-08

## 2023-04-08 RX ORDER — SODIUM CHLORIDE 0.9 % (FLUSH) 0.9 %
5-40 SYRINGE (ML) INJECTION AS NEEDED
Status: DISCONTINUED | OUTPATIENT
Start: 2023-04-08 | End: 2023-04-11 | Stop reason: HOSPADM

## 2023-04-08 RX ORDER — ACETAMINOPHEN 325 MG/1
650 TABLET ORAL
Status: DISCONTINUED | OUTPATIENT
Start: 2023-04-08 | End: 2023-04-11 | Stop reason: HOSPADM

## 2023-04-08 RX ORDER — ACETAMINOPHEN 650 MG/1
650 SUPPOSITORY RECTAL
Status: DISCONTINUED | OUTPATIENT
Start: 2023-04-08 | End: 2023-04-11 | Stop reason: HOSPADM

## 2023-04-08 RX ORDER — VANCOMYCIN 1.75 GRAM/500 ML IN 0.9 % SODIUM CHLORIDE INTRAVENOUS
1750 ONCE
Status: COMPLETED | OUTPATIENT
Start: 2023-04-08 | End: 2023-04-09

## 2023-04-08 RX ORDER — SODIUM CHLORIDE 9 MG/ML
100 INJECTION, SOLUTION INTRAVENOUS CONTINUOUS
Status: DISCONTINUED | OUTPATIENT
Start: 2023-04-08 | End: 2023-04-11 | Stop reason: SDUPTHER

## 2023-04-08 RX ORDER — SODIUM CHLORIDE 0.9 % (FLUSH) 0.9 %
5-10 SYRINGE (ML) INJECTION AS NEEDED
Status: DISCONTINUED | OUTPATIENT
Start: 2023-04-08 | End: 2023-04-11 | Stop reason: HOSPADM

## 2023-04-08 RX ORDER — SODIUM CHLORIDE 0.9 % (FLUSH) 0.9 %
5-40 SYRINGE (ML) INJECTION EVERY 8 HOURS
Status: DISCONTINUED | OUTPATIENT
Start: 2023-04-08 | End: 2023-04-11 | Stop reason: HOSPADM

## 2023-04-08 RX ORDER — ONDANSETRON 2 MG/ML
4 INJECTION INTRAMUSCULAR; INTRAVENOUS
Status: DISCONTINUED | OUTPATIENT
Start: 2023-04-08 | End: 2023-04-08

## 2023-04-08 RX ORDER — ENOXAPARIN SODIUM 100 MG/ML
1 INJECTION SUBCUTANEOUS EVERY 12 HOURS
Status: DISCONTINUED | OUTPATIENT
Start: 2023-04-08 | End: 2023-04-11 | Stop reason: HOSPADM

## 2023-04-08 RX ADMIN — CEFEPIME 2 G: 2 INJECTION, POWDER, FOR SOLUTION INTRAVENOUS at 16:58

## 2023-04-08 RX ADMIN — SODIUM CHLORIDE 1000 ML: 9 INJECTION, SOLUTION INTRAVENOUS at 15:16

## 2023-04-08 RX ADMIN — FAMOTIDINE 20 MG: 10 INJECTION, SOLUTION INTRAVENOUS at 13:20

## 2023-04-08 RX ADMIN — VANCOMYCIN HYDROCHLORIDE 1750 MG: 10 INJECTION, POWDER, LYOPHILIZED, FOR SOLUTION INTRAVENOUS at 19:25

## 2023-04-08 RX ADMIN — SODIUM CHLORIDE 1000 ML: 9 INJECTION, SOLUTION INTRAVENOUS at 13:21

## 2023-04-08 RX ADMIN — PANTOPRAZOLE SODIUM 40 MG: 40 INJECTION, POWDER, FOR SOLUTION INTRAVENOUS at 20:36

## 2023-04-08 RX ADMIN — ONDANSETRON 4 MG: 2 INJECTION INTRAMUSCULAR; INTRAVENOUS at 19:25

## 2023-04-08 RX ADMIN — ACETAMINOPHEN 1000 MG: 500 TABLET ORAL at 16:58

## 2023-04-08 RX ADMIN — IOPAMIDOL 100 ML: 755 INJECTION, SOLUTION INTRAVENOUS at 14:15

## 2023-04-08 RX ADMIN — METOCLOPRAMIDE HYDROCHLORIDE 10 MG: 5 INJECTION INTRAMUSCULAR; INTRAVENOUS at 17:35

## 2023-04-08 RX ADMIN — DEFEROXAMINE MESYLATE 10 MG/KG/HR: 95 INJECTION, POWDER, LYOPHILIZED, FOR SOLUTION INTRAMUSCULAR; INTRAVENOUS; SUBCUTANEOUS at 22:56

## 2023-04-08 RX ADMIN — DIPHENHYDRAMINE HYDROCHLORIDE 12.5 MG: 50 INJECTION, SOLUTION INTRAMUSCULAR; INTRAVENOUS at 17:35

## 2023-04-08 RX ADMIN — SODIUM CHLORIDE 120 ML/HR: 900 INJECTION, SOLUTION INTRAVENOUS at 19:24

## 2023-04-08 RX ADMIN — POTASSIUM BICARBONATE 20 MEQ: 782 TABLET, EFFERVESCENT ORAL at 23:07

## 2023-04-08 RX ADMIN — IOPAMIDOL 62 ML: 755 INJECTION, SOLUTION INTRAVENOUS at 18:41

## 2023-04-08 RX ADMIN — Medication 10 ML: at 23:08

## 2023-04-08 RX ADMIN — ONDANSETRON 4 MG: 2 INJECTION INTRAMUSCULAR; INTRAVENOUS at 13:20

## 2023-04-09 PROBLEM — E87.29 INCREASED ANION GAP METABOLIC ACIDOSIS: Status: ACTIVE | Noted: 2023-04-09

## 2023-04-09 PROBLEM — E83.19 IRON OVERLOAD: Status: ACTIVE | Noted: 2023-04-09

## 2023-04-09 PROBLEM — E87.20 ACIDOSIS: Status: RESOLVED | Noted: 2023-04-08 | Resolved: 2023-04-09

## 2023-04-09 LAB
ALBUMIN SERPL-MCNC: 3.6 G/DL (ref 3.5–5)
ALBUMIN/GLOB SERPL: 0.9 (ref 1.1–2.2)
ALP SERPL-CCNC: 82 U/L (ref 45–117)
ALT SERPL-CCNC: 28 U/L (ref 12–78)
ANION GAP BLD CALC-SCNC: 14 (ref 10–20)
ANION GAP SERPL CALC-SCNC: 10 MMOL/L (ref 5–15)
ANION GAP SERPL CALC-SCNC: 11 MMOL/L (ref 5–15)
ANION GAP SERPL CALC-SCNC: 12 MMOL/L (ref 5–15)
AST SERPL-CCNC: 21 U/L (ref 15–37)
ATRIAL RATE: 127 BPM
BACTERIA SPEC CULT: NORMAL
BASE DEFICIT BLD-SCNC: 9.1 MMOL/L
BASE DEFICIT BLDV-SCNC: 8.9 MMOL/L
BASE DEFICIT BLDV-SCNC: 9.2 MMOL/L
BASOPHILS # BLD: 0 K/UL (ref 0–0.1)
BASOPHILS NFR BLD: 0 % (ref 0–1)
BDY SITE: ABNORMAL
BDY SITE: ABNORMAL
BILIRUB SERPL-MCNC: 1.2 MG/DL (ref 0.2–1)
BUN SERPL-MCNC: 15 MG/DL (ref 6–20)
BUN SERPL-MCNC: 18 MG/DL (ref 6–20)
BUN SERPL-MCNC: 20 MG/DL (ref 6–20)
BUN/CREAT SERPL: 17 (ref 12–20)
BUN/CREAT SERPL: 19 (ref 12–20)
BUN/CREAT SERPL: 20 (ref 12–20)
CA-I BLD-MCNC: 1.21 MMOL/L (ref 1.12–1.32)
CALCIUM SERPL-MCNC: 8.7 MG/DL (ref 8.5–10.1)
CALCIUM SERPL-MCNC: 8.7 MG/DL (ref 8.5–10.1)
CALCIUM SERPL-MCNC: 9.3 MG/DL (ref 8.5–10.1)
CALCULATED P AXIS, ECG09: 60 DEGREES
CALCULATED R AXIS, ECG10: 55 DEGREES
CALCULATED T AXIS, ECG11: 6 DEGREES
CHLORIDE BLD-SCNC: 112 MMOL/L (ref 100–108)
CHLORIDE SERPL-SCNC: 110 MMOL/L (ref 97–108)
CHLORIDE SERPL-SCNC: 113 MMOL/L (ref 97–108)
CHLORIDE SERPL-SCNC: 113 MMOL/L (ref 97–108)
CO2 BLD-SCNC: 14 MMOL/L (ref 19–24)
CO2 SERPL-SCNC: 16 MMOL/L (ref 21–32)
CO2 SERPL-SCNC: 16 MMOL/L (ref 21–32)
CO2 SERPL-SCNC: 17 MMOL/L (ref 21–32)
COMMENT, HOLDF: NORMAL
CREAT SERPL-MCNC: 0.9 MG/DL (ref 0.55–1.02)
CREAT SERPL-MCNC: 0.91 MG/DL (ref 0.55–1.02)
CREAT SERPL-MCNC: 1.04 MG/DL (ref 0.55–1.02)
CREAT UR-MCNC: 0.6 MG/DL (ref 0.6–1.3)
CREAT UR-MCNC: 108 MG/DL
DIAGNOSIS, 93000: NORMAL
DIFFERENTIAL METHOD BLD: ABNORMAL
EOSINOPHIL # BLD: 0 K/UL (ref 0–0.4)
EOSINOPHIL NFR BLD: 0 % (ref 0–7)
GLOBULIN SER CALC-MCNC: 3.9 G/DL (ref 2–4)
GLUCOSE BLD STRIP.AUTO-MCNC: 116 MG/DL (ref 74–106)
GLUCOSE SERPL-MCNC: 121 MG/DL (ref 65–100)
GLUCOSE SERPL-MCNC: 121 MG/DL (ref 65–100)
GLUCOSE SERPL-MCNC: 136 MG/DL (ref 65–100)
HCO3 BLDA-SCNC: 15 MMOL/L
HCO3 BLDV-SCNC: 15 MMOL/L (ref 23–28)
HCO3 BLDV-SCNC: 15 MMOL/L (ref 23–28)
HCT VFR BLD AUTO: 39 % (ref 35–47)
HGB BLD-MCNC: 12 G/DL (ref 11.5–16)
IMM GRANULOCYTES # BLD AUTO: 0 K/UL
IMM GRANULOCYTES NFR BLD AUTO: 0 %
IRON SATN MFR SERPL: 34 % (ref 20–50)
IRON SERPL-MCNC: 51 UG/DL (ref 35–150)
IRON SERPL-MCNC: 58 UG/DL (ref 35–150)
IRON SERPL-MCNC: 77 UG/DL (ref 35–150)
IRON SERPL-MCNC: 93 UG/DL (ref 35–150)
LACTATE BLD-SCNC: 0.62 MMOL/L (ref 0.4–2)
LYMPHOCYTES # BLD: 1.3 K/UL (ref 0.8–3.5)
LYMPHOCYTES NFR BLD: 5 % (ref 12–49)
MCH RBC QN AUTO: 24.5 PG (ref 26–34)
MCHC RBC AUTO-ENTMCNC: 30.8 G/DL (ref 30–36.5)
MCV RBC AUTO: 79.8 FL (ref 80–99)
METAMYELOCYTES NFR BLD MANUAL: 1 %
MONOCYTES # BLD: 2.6 K/UL (ref 0–1)
MONOCYTES NFR BLD: 10 % (ref 5–13)
MYELOCYTES NFR BLD MANUAL: 1 %
NEUTS BAND NFR BLD MANUAL: 3 % (ref 0–6)
NEUTS SEG # BLD: 21.8 K/UL (ref 1.8–8)
NEUTS SEG NFR BLD: 80 % (ref 32–75)
NRBC # BLD: 0 K/UL (ref 0–0.01)
NRBC BLD-RTO: 0 PER 100 WBC
P-R INTERVAL, ECG05: 134 MS
PCO2 BLDV: 26.5 MMHG (ref 41–51)
PCO2 BLDV: 27.4 MMHG (ref 41–51)
PCO2 BLDV: 27.5 MMHG (ref 41–51)
PH BLDV: 7.35 (ref 7.32–7.42)
PH BLDV: 7.35 (ref 7.32–7.42)
PH BLDV: 7.36 (ref 7.32–7.42)
PLATELET # BLD AUTO: 312 K/UL (ref 150–400)
PMV BLD AUTO: 10 FL (ref 8.9–12.9)
PO2 BLDV: 147 MMHG (ref 25–40)
PO2 BLDV: 63 MMHG (ref 25–40)
PO2 BLDV: 71 MMHG (ref 25–40)
POTASSIUM BLD-SCNC: 3.5 MMOL/L (ref 3.5–5.5)
POTASSIUM SERPL-SCNC: 3.3 MMOL/L (ref 3.5–5.1)
POTASSIUM SERPL-SCNC: 3.4 MMOL/L (ref 3.5–5.1)
POTASSIUM SERPL-SCNC: 3.6 MMOL/L (ref 3.5–5.1)
PROT SERPL-MCNC: 7.5 G/DL (ref 6.4–8.2)
Q-T INTERVAL, ECG07: 298 MS
QRS DURATION, ECG06: 66 MS
QTC CALCULATION (BEZET), ECG08: 433 MS
RBC # BLD AUTO: 4.89 M/UL (ref 3.8–5.2)
RBC MORPH BLD: ABNORMAL
RBC MORPH BLD: ABNORMAL
SAMPLES BEING HELD,HOLD: NORMAL
SAO2 % BLD: 91 %
SAO2 % BLDV: 94 % (ref 65–88)
SAO2 % BLDV: 99 % (ref 65–88)
SAO2% DEVICE SAO2% SENSOR NAME: ABNORMAL
SAO2% DEVICE SAO2% SENSOR NAME: ABNORMAL
SERVICE CMNT-IMP: ABNORMAL
SERVICE CMNT-IMP: NORMAL
SODIUM BLD-SCNC: 140 MMOL/L (ref 136–145)
SODIUM SERPL-SCNC: 137 MMOL/L (ref 136–145)
SODIUM SERPL-SCNC: 140 MMOL/L (ref 136–145)
SODIUM SERPL-SCNC: 141 MMOL/L (ref 136–145)
SODIUM UR-SCNC: 11 MMOL/L
SPECIMEN SITE: ABNORMAL
TIBC SERPL-MCNC: 277 UG/DL (ref 250–450)
VANCOMYCIN SERPL-MCNC: 4.7 UG/ML
VENTRICULAR RATE, ECG03: 127 BPM
WBC # BLD AUTO: 26.3 K/UL (ref 3.6–11)

## 2023-04-09 PROCEDURE — 83540 ASSAY OF IRON: CPT

## 2023-04-09 PROCEDURE — C9113 INJ PANTOPRAZOLE SODIUM, VIA: HCPCS | Performed by: STUDENT IN AN ORGANIZED HEALTH CARE EDUCATION/TRAINING PROGRAM

## 2023-04-09 PROCEDURE — 74011000250 HC RX REV CODE- 250: Performed by: STUDENT IN AN ORGANIZED HEALTH CARE EDUCATION/TRAINING PROGRAM

## 2023-04-09 PROCEDURE — 80053 COMPREHEN METABOLIC PANEL: CPT

## 2023-04-09 PROCEDURE — 74011250637 HC RX REV CODE- 250/637: Performed by: STUDENT IN AN ORGANIZED HEALTH CARE EDUCATION/TRAINING PROGRAM

## 2023-04-09 PROCEDURE — 65270000046 HC RM TELEMETRY

## 2023-04-09 PROCEDURE — 74011250636 HC RX REV CODE- 250/636: Performed by: STUDENT IN AN ORGANIZED HEALTH CARE EDUCATION/TRAINING PROGRAM

## 2023-04-09 PROCEDURE — 94761 N-INVAS EAR/PLS OXIMETRY MLT: CPT

## 2023-04-09 PROCEDURE — 80048 BASIC METABOLIC PNL TOTAL CA: CPT

## 2023-04-09 PROCEDURE — 36415 COLL VENOUS BLD VENIPUNCTURE: CPT

## 2023-04-09 PROCEDURE — 74011000258 HC RX REV CODE- 258: Performed by: STUDENT IN AN ORGANIZED HEALTH CARE EDUCATION/TRAINING PROGRAM

## 2023-04-09 PROCEDURE — 99222 1ST HOSP IP/OBS MODERATE 55: CPT | Performed by: FAMILY MEDICINE

## 2023-04-09 PROCEDURE — 80202 ASSAY OF VANCOMYCIN: CPT

## 2023-04-09 PROCEDURE — 82947 ASSAY GLUCOSE BLOOD QUANT: CPT

## 2023-04-09 PROCEDURE — 82803 BLOOD GASES ANY COMBINATION: CPT

## 2023-04-09 PROCEDURE — 85025 COMPLETE CBC W/AUTO DIFF WBC: CPT

## 2023-04-09 RX ORDER — PROCHLORPERAZINE MALEATE 5 MG
5 TABLET ORAL
Status: DISCONTINUED | OUTPATIENT
Start: 2023-04-09 | End: 2023-04-11 | Stop reason: HOSPADM

## 2023-04-09 RX ORDER — CYCLOBENZAPRINE HCL 10 MG
10 TABLET ORAL
Status: DISCONTINUED | OUTPATIENT
Start: 2023-04-09 | End: 2023-04-11 | Stop reason: HOSPADM

## 2023-04-09 RX ADMIN — PROCHLORPERAZINE EDISYLATE 10 MG: 5 INJECTION INTRAMUSCULAR; INTRAVENOUS at 00:07

## 2023-04-09 RX ADMIN — METRONIDAZOLE 500 MG: 500 INJECTION, SOLUTION INTRAVENOUS at 11:16

## 2023-04-09 RX ADMIN — ENOXAPARIN SODIUM 70 MG: 100 INJECTION SUBCUTANEOUS at 00:10

## 2023-04-09 RX ADMIN — ONDANSETRON 4 MG: 2 INJECTION INTRAMUSCULAR; INTRAVENOUS at 17:12

## 2023-04-09 RX ADMIN — PANTOPRAZOLE SODIUM 40 MG: 40 INJECTION, POWDER, FOR SOLUTION INTRAVENOUS at 10:07

## 2023-04-09 RX ADMIN — ONDANSETRON 4 MG: 2 INJECTION INTRAMUSCULAR; INTRAVENOUS at 11:12

## 2023-04-09 RX ADMIN — SODIUM CHLORIDE 120 ML/HR: 900 INJECTION, SOLUTION INTRAVENOUS at 10:03

## 2023-04-09 RX ADMIN — Medication 10 ML: at 05:14

## 2023-04-09 RX ADMIN — METRONIDAZOLE 500 MG: 500 INJECTION, SOLUTION INTRAVENOUS at 00:10

## 2023-04-09 RX ADMIN — CEFEPIME 2 G: 20 INJECTION, POWDER, FOR SOLUTION INTRAVENOUS at 12:15

## 2023-04-09 RX ADMIN — ALUMINUM HYDROXIDE, MAGNESIUM HYDROXIDE, AND SIMETHICONE 40 ML: 200; 200; 20 SUSPENSION ORAL at 11:12

## 2023-04-09 RX ADMIN — CYCLOBENZAPRINE 10 MG: 10 TABLET, FILM COATED ORAL at 21:23

## 2023-04-09 RX ADMIN — VANCOMYCIN HYDROCHLORIDE 750 MG: 750 INJECTION, POWDER, LYOPHILIZED, FOR SOLUTION INTRAVENOUS at 10:03

## 2023-04-09 RX ADMIN — Medication 10 ML: at 14:49

## 2023-04-09 RX ADMIN — ENOXAPARIN SODIUM 70 MG: 100 INJECTION SUBCUTANEOUS at 12:17

## 2023-04-09 RX ADMIN — PANTOPRAZOLE SODIUM 40 MG: 40 INJECTION, POWDER, FOR SOLUTION INTRAVENOUS at 20:38

## 2023-04-09 RX ADMIN — ACETAMINOPHEN 650 MG: 325 TABLET ORAL at 20:20

## 2023-04-09 RX ADMIN — PROCHLORPERAZINE MALEATE 5 MG: 5 TABLET ORAL at 14:45

## 2023-04-09 RX ADMIN — ALUMINUM HYDROXIDE, MAGNESIUM HYDROXIDE, AND SIMETHICONE 40 ML: 200; 200; 20 SUSPENSION ORAL at 17:05

## 2023-04-09 RX ADMIN — VANCOMYCIN HYDROCHLORIDE 750 MG: 750 INJECTION, POWDER, LYOPHILIZED, FOR SOLUTION INTRAVENOUS at 20:36

## 2023-04-09 RX ADMIN — CEFEPIME 2 G: 20 INJECTION, POWDER, FOR SOLUTION INTRAVENOUS at 01:43

## 2023-04-09 RX ADMIN — Medication 10 ML: at 21:00

## 2023-04-09 RX ADMIN — ONDANSETRON 4 MG: 2 INJECTION INTRAMUSCULAR; INTRAVENOUS at 05:22

## 2023-04-09 RX ADMIN — CEFEPIME 2 G: 20 INJECTION, POWDER, FOR SOLUTION INTRAVENOUS at 17:06

## 2023-04-10 LAB
ALBUMIN SERPL-MCNC: 3.2 G/DL (ref 3.5–5)
ALBUMIN/GLOB SERPL: 0.9 (ref 1.1–2.2)
ALP SERPL-CCNC: 72 U/L (ref 45–117)
ALT SERPL-CCNC: 27 U/L (ref 12–78)
ANION GAP SERPL CALC-SCNC: 6 MMOL/L (ref 5–15)
ANION GAP SERPL CALC-SCNC: 7 MMOL/L (ref 5–15)
ANION GAP SERPL CALC-SCNC: 9 MMOL/L (ref 5–15)
APPEARANCE UR: CLEAR
AST SERPL-CCNC: 24 U/L (ref 15–37)
BACTERIA SPEC CULT: NORMAL
BACTERIA URNS QL MICRO: NEGATIVE /HPF
BASOPHILS # BLD: 0.1 K/UL (ref 0–0.1)
BASOPHILS NFR BLD: 1 % (ref 0–1)
BILIRUB SERPL-MCNC: 1.1 MG/DL (ref 0.2–1)
BILIRUB UR QL: NEGATIVE
BUN SERPL-MCNC: 4 MG/DL (ref 6–20)
BUN SERPL-MCNC: 5 MG/DL (ref 6–20)
BUN SERPL-MCNC: 6 MG/DL (ref 6–20)
BUN/CREAT SERPL: 8 (ref 12–20)
BUN/CREAT SERPL: 9 (ref 12–20)
BUN/CREAT SERPL: 9 (ref 12–20)
CALCIUM SERPL-MCNC: 8.3 MG/DL (ref 8.5–10.1)
CALCIUM SERPL-MCNC: 8.5 MG/DL (ref 8.5–10.1)
CALCIUM SERPL-MCNC: 8.7 MG/DL (ref 8.5–10.1)
CHLORIDE SERPL-SCNC: 101 MMOL/L (ref 97–108)
CHLORIDE SERPL-SCNC: 101 MMOL/L (ref 97–108)
CHLORIDE SERPL-SCNC: 96 MMOL/L (ref 97–108)
CO2 SERPL-SCNC: 24 MMOL/L (ref 21–32)
CO2 SERPL-SCNC: 26 MMOL/L (ref 21–32)
CO2 SERPL-SCNC: 28 MMOL/L (ref 21–32)
COLOR UR: ABNORMAL
CREAT SERPL-MCNC: 0.53 MG/DL (ref 0.55–1.02)
CREAT SERPL-MCNC: 0.55 MG/DL (ref 0.55–1.02)
CREAT SERPL-MCNC: 0.68 MG/DL (ref 0.55–1.02)
DIFFERENTIAL METHOD BLD: ABNORMAL
EOSINOPHIL # BLD: 0 K/UL (ref 0–0.4)
EOSINOPHIL NFR BLD: 0 % (ref 0–7)
EPITH CASTS URNS QL MICRO: ABNORMAL /LPF
GLOBULIN SER CALC-MCNC: 3.7 G/DL (ref 2–4)
GLUCOSE SERPL-MCNC: 118 MG/DL (ref 65–100)
GLUCOSE SERPL-MCNC: 149 MG/DL (ref 65–100)
GLUCOSE SERPL-MCNC: 149 MG/DL (ref 65–100)
GLUCOSE UR STRIP.AUTO-MCNC: NEGATIVE MG/DL
HCT VFR BLD AUTO: 34.3 % (ref 35–47)
HGB BLD-MCNC: 10.9 G/DL (ref 11.5–16)
HGB UR QL STRIP: ABNORMAL
HYALINE CASTS URNS QL MICRO: ABNORMAL /LPF (ref 0–2)
IMM GRANULOCYTES # BLD AUTO: 0 K/UL (ref 0–0.04)
IMM GRANULOCYTES NFR BLD AUTO: 0 % (ref 0–0.5)
KETONES UR QL STRIP.AUTO: 40 MG/DL
LEUKOCYTE ESTERASE UR QL STRIP.AUTO: NEGATIVE
LYMPHOCYTES # BLD: 1.7 K/UL (ref 0.8–3.5)
LYMPHOCYTES NFR BLD: 13 % (ref 12–49)
MAGNESIUM SERPL-MCNC: 1.4 MG/DL (ref 1.6–2.4)
MCH RBC QN AUTO: 24.8 PG (ref 26–34)
MCHC RBC AUTO-ENTMCNC: 31.8 G/DL (ref 30–36.5)
MCV RBC AUTO: 78 FL (ref 80–99)
MONOCYTES # BLD: 1.4 K/UL (ref 0–1)
MONOCYTES NFR BLD: 11 % (ref 5–13)
NEUTS SEG # BLD: 9.8 K/UL (ref 1.8–8)
NEUTS SEG NFR BLD: 75 % (ref 32–75)
NITRITE UR QL STRIP.AUTO: NEGATIVE
NRBC # BLD: 0.02 K/UL (ref 0–0.01)
NRBC BLD-RTO: 0.2 PER 100 WBC
PH UR STRIP: 7 (ref 5–8)
PLATELET # BLD AUTO: 268 K/UL (ref 150–400)
PMV BLD AUTO: 9.9 FL (ref 8.9–12.9)
POTASSIUM SERPL-SCNC: 2.2 MMOL/L (ref 3.5–5.1)
POTASSIUM SERPL-SCNC: 2.4 MMOL/L (ref 3.5–5.1)
POTASSIUM SERPL-SCNC: 2.9 MMOL/L (ref 3.5–5.1)
PROT SERPL-MCNC: 6.9 G/DL (ref 6.4–8.2)
PROT UR STRIP-MCNC: ABNORMAL MG/DL
RBC # BLD AUTO: 4.4 M/UL (ref 3.8–5.2)
RBC #/AREA URNS HPF: ABNORMAL /HPF (ref 0–5)
RBC MORPH BLD: ABNORMAL
SERVICE CMNT-IMP: NORMAL
SERVICE CMNT-IMP: NORMAL
SODIUM SERPL-SCNC: 130 MMOL/L (ref 136–145)
SODIUM SERPL-SCNC: 134 MMOL/L (ref 136–145)
SODIUM SERPL-SCNC: 134 MMOL/L (ref 136–145)
SP GR UR REFRACTOMETRY: 1.01 (ref 1–1.03)
UROBILINOGEN UR QL STRIP.AUTO: 0.2 EU/DL (ref 0.2–1)
WBC # BLD AUTO: 13 K/UL (ref 3.6–11)
WBC URNS QL MICRO: ABNORMAL /HPF (ref 0–4)

## 2023-04-10 PROCEDURE — 85025 COMPLETE CBC W/AUTO DIFF WBC: CPT

## 2023-04-10 PROCEDURE — 74011000250 HC RX REV CODE- 250

## 2023-04-10 PROCEDURE — 2709999900 HC NON-CHARGEABLE SUPPLY

## 2023-04-10 PROCEDURE — 74011250636 HC RX REV CODE- 250/636

## 2023-04-10 PROCEDURE — C9113 INJ PANTOPRAZOLE SODIUM, VIA: HCPCS | Performed by: STUDENT IN AN ORGANIZED HEALTH CARE EDUCATION/TRAINING PROGRAM

## 2023-04-10 PROCEDURE — 74011250636 HC RX REV CODE- 250/636: Performed by: STUDENT IN AN ORGANIZED HEALTH CARE EDUCATION/TRAINING PROGRAM

## 2023-04-10 PROCEDURE — 93005 ELECTROCARDIOGRAM TRACING: CPT

## 2023-04-10 PROCEDURE — 74011250637 HC RX REV CODE- 250/637: Performed by: STUDENT IN AN ORGANIZED HEALTH CARE EDUCATION/TRAINING PROGRAM

## 2023-04-10 PROCEDURE — 74011000250 HC RX REV CODE- 250: Performed by: STUDENT IN AN ORGANIZED HEALTH CARE EDUCATION/TRAINING PROGRAM

## 2023-04-10 PROCEDURE — 74011000258 HC RX REV CODE- 258: Performed by: STUDENT IN AN ORGANIZED HEALTH CARE EDUCATION/TRAINING PROGRAM

## 2023-04-10 PROCEDURE — 81001 URINALYSIS AUTO W/SCOPE: CPT

## 2023-04-10 PROCEDURE — 83735 ASSAY OF MAGNESIUM: CPT

## 2023-04-10 PROCEDURE — 94761 N-INVAS EAR/PLS OXIMETRY MLT: CPT

## 2023-04-10 PROCEDURE — 80053 COMPREHEN METABOLIC PANEL: CPT

## 2023-04-10 PROCEDURE — 80048 BASIC METABOLIC PNL TOTAL CA: CPT

## 2023-04-10 PROCEDURE — 99232 SBSQ HOSP IP/OBS MODERATE 35: CPT | Performed by: FAMILY MEDICINE

## 2023-04-10 PROCEDURE — 74011250637 HC RX REV CODE- 250/637

## 2023-04-10 PROCEDURE — 36415 COLL VENOUS BLD VENIPUNCTURE: CPT

## 2023-04-10 PROCEDURE — 65270000046 HC RM TELEMETRY

## 2023-04-10 RX ORDER — POTASSIUM CHLORIDE 7.45 MG/ML
10 INJECTION INTRAVENOUS
Status: COMPLETED | OUTPATIENT
Start: 2023-04-10 | End: 2023-04-10

## 2023-04-10 RX ORDER — SUCRALFATE 1 G/1
1 TABLET ORAL 2 TIMES DAILY
Status: DISCONTINUED | OUTPATIENT
Start: 2023-04-10 | End: 2023-04-11

## 2023-04-10 RX ORDER — POTASSIUM CHLORIDE 7.45 MG/ML
10 INJECTION INTRAVENOUS
Status: COMPLETED | OUTPATIENT
Start: 2023-04-10 | End: 2023-04-11

## 2023-04-10 RX ORDER — MAGNESIUM SULFATE HEPTAHYDRATE 40 MG/ML
2 INJECTION, SOLUTION INTRAVENOUS
Status: COMPLETED | OUTPATIENT
Start: 2023-04-10 | End: 2023-04-10

## 2023-04-10 RX ADMIN — Medication 10 ML: at 21:06

## 2023-04-10 RX ADMIN — POTASSIUM CHLORIDE 10 MEQ: 7.45 INJECTION INTRAVENOUS at 04:00

## 2023-04-10 RX ADMIN — MAGNESIUM SULFATE HEPTAHYDRATE 2 G: 40 INJECTION, SOLUTION INTRAVENOUS at 17:24

## 2023-04-10 RX ADMIN — ENOXAPARIN SODIUM 70 MG: 100 INJECTION SUBCUTANEOUS at 23:53

## 2023-04-10 RX ADMIN — CEFEPIME 2 G: 20 INJECTION, POWDER, FOR SOLUTION INTRAVENOUS at 03:15

## 2023-04-10 RX ADMIN — POTASSIUM BICARBONATE 50 MEQ: 782 TABLET, EFFERVESCENT ORAL at 02:00

## 2023-04-10 RX ADMIN — PROCHLORPERAZINE MALEATE 5 MG: 5 TABLET ORAL at 08:37

## 2023-04-10 RX ADMIN — METRONIDAZOLE 500 MG: 500 INJECTION, SOLUTION INTRAVENOUS at 00:15

## 2023-04-10 RX ADMIN — Medication 10 ML: at 14:06

## 2023-04-10 RX ADMIN — METRONIDAZOLE 500 MG: 500 INJECTION, SOLUTION INTRAVENOUS at 23:53

## 2023-04-10 RX ADMIN — POTASSIUM CHLORIDE 10 MEQ: 7.46 INJECTION, SOLUTION INTRAVENOUS at 14:04

## 2023-04-10 RX ADMIN — POTASSIUM CHLORIDE 10 MEQ: 7.45 INJECTION INTRAVENOUS at 22:22

## 2023-04-10 RX ADMIN — POTASSIUM CHLORIDE 10 MEQ: 7.45 INJECTION INTRAVENOUS at 22:30

## 2023-04-10 RX ADMIN — POTASSIUM CHLORIDE 10 MEQ: 7.46 INJECTION, SOLUTION INTRAVENOUS at 12:42

## 2023-04-10 RX ADMIN — CEFEPIME 2 G: 20 INJECTION, POWDER, FOR SOLUTION INTRAVENOUS at 09:51

## 2023-04-10 RX ADMIN — ALUMINUM HYDROXIDE, MAGNESIUM HYDROXIDE, AND SIMETHICONE 40 ML: 200; 200; 20 SUSPENSION ORAL at 08:46

## 2023-04-10 RX ADMIN — VANCOMYCIN HYDROCHLORIDE 1250 MG: 1.25 INJECTION, POWDER, LYOPHILIZED, FOR SOLUTION INTRAVENOUS at 03:58

## 2023-04-10 RX ADMIN — ENOXAPARIN SODIUM 70 MG: 100 INJECTION SUBCUTANEOUS at 00:14

## 2023-04-10 RX ADMIN — CYCLOBENZAPRINE 10 MG: 10 TABLET, FILM COATED ORAL at 08:37

## 2023-04-10 RX ADMIN — POTASSIUM CHLORIDE 10 MEQ: 7.46 INJECTION, SOLUTION INTRAVENOUS at 16:22

## 2023-04-10 RX ADMIN — ENOXAPARIN SODIUM 70 MG: 100 INJECTION SUBCUTANEOUS at 12:43

## 2023-04-10 RX ADMIN — Medication 10 ML: at 04:00

## 2023-04-10 RX ADMIN — PANTOPRAZOLE SODIUM 40 MG: 40 INJECTION, POWDER, FOR SOLUTION INTRAVENOUS at 08:38

## 2023-04-10 RX ADMIN — ALUMINUM HYDROXIDE, MAGNESIUM HYDROXIDE, AND SIMETHICONE 40 ML: 200; 200; 20 SUSPENSION ORAL at 16:20

## 2023-04-10 RX ADMIN — POTASSIUM CHLORIDE 10 MEQ: 7.45 INJECTION INTRAVENOUS at 03:15

## 2023-04-10 RX ADMIN — CYCLOBENZAPRINE 10 MG: 10 TABLET, FILM COATED ORAL at 23:53

## 2023-04-10 RX ADMIN — CYCLOBENZAPRINE 10 MG: 10 TABLET, FILM COATED ORAL at 16:24

## 2023-04-10 RX ADMIN — POTASSIUM CHLORIDE 10 MEQ: 7.46 INJECTION, SOLUTION INTRAVENOUS at 18:37

## 2023-04-10 RX ADMIN — PROCHLORPERAZINE MALEATE 5 MG: 5 TABLET ORAL at 00:14

## 2023-04-10 RX ADMIN — POTASSIUM CHLORIDE 10 MEQ: 7.45 INJECTION INTRAVENOUS at 05:00

## 2023-04-10 RX ADMIN — SUCRALFATE 1 G: 1 TABLET ORAL at 18:06

## 2023-04-10 RX ADMIN — POTASSIUM CHLORIDE 10 MEQ: 7.45 INJECTION INTRAVENOUS at 21:06

## 2023-04-10 RX ADMIN — ALUMINUM HYDROXIDE, MAGNESIUM HYDROXIDE, AND SIMETHICONE 40 ML: 200; 200; 20 SUSPENSION ORAL at 13:04

## 2023-04-10 RX ADMIN — METRONIDAZOLE 500 MG: 500 INJECTION, SOLUTION INTRAVENOUS at 12:42

## 2023-04-10 RX ADMIN — MAGNESIUM SULFATE HEPTAHYDRATE 2 G: 40 INJECTION, SOLUTION INTRAVENOUS at 14:40

## 2023-04-10 RX ADMIN — CEFEPIME 2 G: 20 INJECTION, POWDER, FOR SOLUTION INTRAVENOUS at 18:07

## 2023-04-10 RX ADMIN — POTASSIUM CHLORIDE 10 MEQ: 7.46 INJECTION, SOLUTION INTRAVENOUS at 15:04

## 2023-04-10 RX ADMIN — POTASSIUM CHLORIDE 10 MEQ: 7.45 INJECTION INTRAVENOUS at 23:56

## 2023-04-10 RX ADMIN — PANTOPRAZOLE SODIUM 40 MG: 40 INJECTION, POWDER, FOR SOLUTION INTRAVENOUS at 20:57

## 2023-04-10 NOTE — PROGRESS NOTES
0700: Bedside and Verbal shift change report given to April RN/ Ramone RN (oncoming nurse) by Demi Renee RN (offgoing nurse). Report included the following information SBAR, ED Summary, Intake/Output, MAR, Recent Results, and Cardiac Rhythm NSR/ST .      1222: Lab called with a critical K+ 2.2. IV and PO potassium ordered. 1415: Pt states she can not consume Effor-K tabs. MD notified. Effor K discontinued. More IV potassium added. 1930: Bedside and Verbal shift change report given to Starla RN (oncoming nurse) by Ramone RN/ April RN (offgoing nurse). Report included the following information SBAR, Kardex, ED Summary, Intake/Output, MAR, Recent Results, and Cardiac Rhythm NSR/ST .

## 2023-04-10 NOTE — PROGRESS NOTES
[address] [place] [address] with [facility] and [facility]       Resident Progress Note    S: Patient seen and examined at bedside. Pt endorses 10/10 back pain. Says it reminds her of when she had a kidney stone. O:  Visit Vitals  BP (!) 143/91 (BP 1 Location: Right upper arm, BP Patient Position: At rest)   Pulse (!) 105   Temp 98.9 °F (37.2 °C)   Resp 20   Ht 5' 3\" (1.6 m)   Wt 171 lb 4.8 oz (77.7 kg)   SpO2 96%   BMI 30.34 kg/m²     Physical Examination:   General appearance - alert, tearful  Abdomen - soft, nontender, nondistended, no masses or organomegaly  MSK - Tender to palpation of bilateral paraspinal muscles in lumbar region, palpable spasms. A/P:   Back Pain: Exam consistent with muscle spasms.  Hx concerning for nephrolithiasis,  - Flexeril TID prn  - UA to evaluate for possible stone, can consider CT    8:48 PM 4/9/2023  Amberly Gutierrez MD  Family Medicine Resident

## 2023-04-10 NOTE — DISCHARGE INSTRUCTIONS
HOME DISCHARGE INSTRUCTIONS    Enedelia Lamb / [id number] : 1985    Admission date: 2023 Discharge date: 4/10/2023 12:02 PM     Please bring this form with you to show your care provider at your follow-up appointment. Primary care provider:  Freda Sanchez      Discharging provider:  Erick Duncan MD  - Family Medicine Resident  Yoselin Mijares MD - Family Medicine Attending      You have been admitted to the hospital with the following diagnoses:    ACUTE DIAGNOSES:  Sepsis ([address] [A41.9]  Acidosis [E87.20]  Elevated troponin [R77.8]  Chest pain [R07.9]    You were admitted for acidosis secondary to iron overload likely secondary to transfusion. We gave you fluids and medicine to remove iron from your body. We supplemented you with potassium, gave you antibiotics for a possible abdominal infection and medicines to help with chronic abdominal pain. You should continue your potassium supplements, follow up with GI and nephrology outpatient. FOLLOW-UP CARE RECOMMENDATIONS:  Gastroenterology for endoscopy outpatient for gastritis  Nephrology for kidney stones  Hematology for iron supplementation  Primary care provider for hospital follow up and repeat potassium level     Appointments  Future Appointments   Date Time Provider Holland Medina   5/3/2023  1:30 PM Whitley Mahoney MD [facility] BS AMB   2023 11:00 AM Leyla Conrad NP [facility] BS AMB          Follow-up tests needed: Repete potassium levels    Pending test results: At the time of your discharge the following test results are still pending: None. Please make sure you review these results with your outpatient follow-up provider(s). DIET/what to eat:  Regular Diet, GI bland    ACTIVITY:  Activity as tolerated    Wound care: None    Equipment needed:  None     Specific symptoms to watch for:  nausea, vomiting, diarrhea     What to do if new or unexpected symptoms occur?     If you experience any of the above symptoms (or should other concerns or questions arise after discharge) please call your primary care physician. Return to the emergency room if you cannot get hold of your doctor. It is very important that you keep your follow-up appointment(s). Please bring discharge papers, medication list (and/or medication bottles) to your follow-up appointments for review by your outpatient provider(s). Please check the list of medications and be sure it includes every medication (even non-prescription medications) that your provider wants you to take. It is important that you take the medication exactly as they are prescribed. Keep your medication in the bottles provided by the pharmacist and keep a list of the medication names, dosages, and times to be taken in your wallet. Do not take other medications without consulting your doctor. If you have any questions about your medications or other instructions, please talk to your nurse or care provider before you leave the hospital.     Information obtained by:     I understand that if any problems occur once I am at home I am to contact my physician. These instructions were explained to me and I had the opportunity to ask questions. I understand and acknowledge receipt of the instructions indicated above.                                                                                                                                                Physician's or R.N.'s Signature                                                                  Date/Time                                                                                                                                              Patient or Representative Signature                                                          Date/Time

## 2023-04-10 NOTE — PROGRESS NOTES
Problem: Falls - Risk of  Goal: *Absence of Falls  Description: Document Abhinav Graft Fall Risk and appropriate interventions in the flowsheet.   Outcome: Progressing Towards Goal  Note: Fall Risk Interventions:                                Problem: Patient Education: Go to Patient Education Activity  Goal: Patient/Family Education  Outcome: Progressing Towards Goal     Problem: Pain  Goal: *Control of Pain  Outcome: Progressing Towards Goal     Problem: Patient Education: Go to Patient Education Activity  Goal: Patient/Family Education  Outcome: Progressing Towards Goal

## 2023-04-10 NOTE — PROGRESS NOTES
Spiritual Care Assessment/Progress Note  [address]      NAME: Uzma Segura      MRN: [id number]  AGE: 30 y.o.  SEX: female  Yazidism Affiliation: Other   Language: English     4/10/2023     Total Time (in minutes): 19     Spiritual Assessment begun in [facility] 3 PRO CARE TELE 2 through conversation with:         [x]Patient        [] Family    [] Friend(s)        Reason for Consult: Palliative Care, Initial/Spiritual Assessment     Spiritual beliefs: (Please include comment if needed)     [] Identifies with a angelique tradition:         [] Supported by a angelique community:            [] Claims no spiritual orientation:           [] Seeking spiritual identity:                [x] Adheres to an individual form of spirituality:           [] Not able to assess:                           Identified resources for coping:      [] Prayer                               [] Music                  [] Guided Imagery     [x] Family/friends                 [] Pet visits     [] Devotional reading                         [] Unknown     [] Other:                                               Interventions offered during this visit: (See comments for more details)    Patient Interventions: Affirmation of emotions/emotional suffering, Iconic (affirming the presence of God/Higher Power), Initial/Spiritual assessment, patient floor           Plan of Care:     [] Support spiritual and/or cultural needs    [] Support AMD and/or advance care planning process      [] Support grieving process   [] Coordinate Rites and/or Rituals    [] Coordination with community clergy   [] No spiritual needs identified at this time   [] Detailed Plan of Care below (See Comments)  [] Make referral to Music Therapy  [] Make referral to Pet Therapy     [] Make referral to Addiction services  [] Make referral to [facility]  [] Make referral to Spiritual Care Partner  [] No future visits requested        [x] Contact Spiritual Care for further referrals Comments: Initial spiritual assessment in [facility]. Reviewed chart prior to visit. Miss Yvon Clay indicated she was doing ok, a bit discouraged to be in the hospital over this past weekend. No only was it [place] but her daughter's birthday was yesterday. Her  and daughter came to visit and celebrated her daughter's birthday. She indicated she has belief in God though is not very Religous. Affirmed her angelique in God and the support of her family. Provided spiritual presence and assurance of prayer. Contact Spiritual Care for any further referrals.    Asuncion Barrios MS., [facility]  Page a  [phone]- Anderson Nunez ([phone])

## 2023-04-10 NOTE — PROGRESS NOTES
[address] Dosing Services: Vancomycin Level Evaluation 4/9/23     Consult for antibiotic dosing of vancomycin by Dr. Francisco Mcintosh  Indication: sepsis  Day of Therapy: 2    Random level: 4.7mcg/mL drawn approximately 10.5hrs from the start of the last dose is subtherapeutic   ( AUC ~340)  Change dose to 1250mg ivpb q12h for a target AUC/RICO 400-600. Pharmacy to continue to follow daily. Thank you,    Julia Escudero. A[place], Pharm D.         Contact: [phone]

## 2023-04-10 NOTE — PROGRESS NOTES
[facility] Daily Progress Note    24 Hour Events: NAEO    SUBJECTIVE: Had nausea. Asking for food. Denies chest pain, SOB, nausea, vomiting, abdominal pain, dizziness. OBJECTIVE:    Visit Vitals  /80 (BP 1 Location: Right upper arm, BP Patient Position: At rest)   Pulse 98   Temp 99.2 °F (37.3 °C)   Resp 16   Ht 5' 3\" (1.6 m)   Wt 161 lb 6.4 oz (73.2 kg)   SpO2 95%   BMI 28.59 kg/m²       Physical Exam:  General: No acute distress  Respiratory: Clear lung fields bilaterally, no wheeze, rales  Cardiovascular: Regular rate, rhythm no murmurs gallops  GI: Nondistended. + bowel sounds. Nontender. Extremities: No LE edema. Skin: Warm, dry.   Neuro: Alert and oriented    Inpatient Medications  Current Facility-Administered Medications   Medication Dose Route Frequency    potassium bicarb-citric acid (EFFER-K) tablet 40 mEq  40 mEq Oral Q2H    potassium chloride 10 mEq in 100 ml IVPB  10 mEq IntraVENous Q1H    prochlorperazine (COMPAZINE) tablet 5 mg  5 mg Oral Q6H PRN    cyclobenzaprine (FLEXERIL) tablet 10 mg  10 mg Oral TID PRN    vancomycin (VANCOCIN) 1,250 mg in 0.9% sodium chloride 250 mL (Weey2Afu)  1,250 mg IntraVENous Q12H    sodium chloride (NS) flush 5-10 mL  5-10 mL IntraVENous PRN    sodium chloride (NS) flush 5-40 mL  5-40 mL IntraVENous Q8H    sodium chloride (NS) flush 5-40 mL  5-40 mL IntraVENous PRN    acetaminophen (TYLENOL) tablet 650 mg  650 mg Oral Q6H PRN    Or    acetaminophen (TYLENOL) suppository 650 mg  650 mg Rectal Q6H PRN    polyethylene glycol (MIRALAX) packet 17 g  17 g Oral DAILY PRN    0.9% sodium chloride infusion  120 mL/hr IntraVENous CONTINUOUS    metroNIDAZOLE (FLAGYL) IVPB premix 500 mg  500 mg IntraVENous Q12H    cefepime (MAXIPIME) 2 g in 0.9% sodium chloride (MBP/ADV) 100 mL MBP  2 g IntraVENous Q8H    ondansetron (ZOFRAN ODT) tablet 4 mg  4 mg Oral Q6H PRN    Or    ondansetron (ZOFRAN) injection 4 mg  4 mg IntraVENous Q6H PRN    pantoprazole (PROTONIX) 40 mg in 0.9% sodium chloride 10 mL injection  40 mg IntraVENous Q12H    enoxaparin (LOVENOX) injection 70 mg  1 mg/kg SubCUTAneous Q12H       Allergies  Allergies   Allergen Reactions    Other Medication Hives     Sulfa drugs    Sulfa (Sulfonamide Antibiotics) Hives       CBC:  Recent Labs     04/10/23  0021 04/09/23  0020 04/08/23  1213   WBC 13.0* 26.3* 25.2*   HGB 10.9* 12.0 14.8   HCT 34.3* 39.0 46.7    312 570*         Metabolic Panel:  Recent Labs     04/10/23  1116 04/10/23  0021 04/09/23  0611 04/09/23  0310 04/09/23  0020 04/08/23  2130 04/08/23  1702 04/08/23  1213   * 134* 140   < > 137 139   < > 133*   K 2.2* 2.4* 3.3*   < > 3.6 3.3*   < > 3.5    101 113*   < > 110* 111*   < > 99   CO2 26 24 16*   < > 17* 15*   < > 15*   BUN 4* 6 15   < > 20 23*   < > 32*   CREA 0.53* 0.68 0.90   < > 1.04* 1.06*   < > 1.49*   * 118* 121*   < > 136* 143*   < > 200*   CA 8.3* 8.7 8.7   < > 9.3 9.5   < > 10.8*   ALB  --  3.2*  --   --  3.6  --   --  4.7   ALT  --  27  --   --  28  --   --  36   INR  --   --   --   --   --  1.0  --   --     < > = values in this interval not displayed. Assessment and Plan   Artemio Hall is a 30 y.o. female with a PMHx of HTN, DVT, neuropathy, h/o alcoholic pancreatitis, h/o hepatic steatosis, KWABENA who is admitted for sepsis and anion gap metabolic acidosis. Sepsis: Meets 2/4 SIRS. S/p 30 cc/kg. Lactic /procal, Flu, covid, strep negative. Unclear etiology. S/p vanc 4/8-10  - Continue cefepime, and flagyl (4/8-) . - Switch OP CTX and flagyl  - CLD, ADAT  - Bcx, Ucx, throat cx NGTD  - continue IV fluids at maintenance rate      Anion gap metabolic acidosis: Resolved. AG 19 on admission. Etoh <10. A1c 5.6, glucose wnl, Not DKA. Recent iron infusions and oral iron intake. Iron levels elevated >500 on day of last infusion 4/5 just prior to symptom onset. S/p deferoxime. S/p VBG, iron q2h x 3. Iron levels dropped and stabilized.  Salicylates neg.   - Follow up volatiles   - Compazine, Zofran for vomiting and  GI cocktail now  - CLD, ADAT    Hypokalemia: replete prn. Likely 2/2 acidosis  - EKG stat, BMP at 1800  - Magnesium labs  - Daily labs     Elevated troponin: EKG with sinus tachycardia, no obvious acute ST abnormality noted. Trop flat     Tachycardia: Resolved likely secondary to sepsis and IVVD given persistent vomiting. Given history of unprovoked DVT and unable to tolerate po Xarelto in setting of acute illness. CTA chest no PE  - IV fluids       Acute Kidney Injury: Resolved BL 0.6. Likely 2/2 IVVD in setting of persistent vomiting and decreased oral intake. Expect to improve with IV hydration. Avoid nephrotoxic agents. - Urine electrolytes  - IVF: NS @ 120 mL/hr  - Strict I&O, monitor to keep urine output > 30 mL/hr     Iron deficiency anemia: Following outpatient with hematology. Recently completed course of IV Venofer infusions, also taking additional oral iron supplementation. Given concern for symptomatic iron poisoning will hold further iron supplementation. - stop oral iron   - Follow up with hematology OP    Epigastric pain: chronic for > 10 years. Told to have gastritis. Follow with GI outpatient at [facility] gastrology  - Follow up with GI for EGD OP  - Mylanta TID prn, Protonix BID. Consider carafate     Hypertension:  - BP on admission 134/91. Seems to be well controlled. - Hold home HCTZ 25mg    - Will continue to monitor at this time and readjust as BP's trend. Consider adding prn agent if needed while NPO. Hx unprovoked DVT: Noted to upper extremity in 2019. Completed initial therapy with decision to remain on prophylactic dosing. Has been on Xarelto 20mg daily. - start prophylactic lovenox while NPO, then resume Xarelto when able to tolerate oral intake. Hx neuropathy: Chronic, over the last 3 years.  Reported development after prior hospitalization in [place] in 2129 after metabolic seizure with renal failure requiring CRRT. Improved, though still with persistent left foot drop per patient. - Continue outpatient follow up with neurology      Hx alcoholic pancreatitis: Previously admitted for this back in November 2022. However denies alcohol use since that time. Epigastric pain present but lipase unremarkable. Hepatic steatosis: Noted on prior imaging 11/2022 and repeat CT on admission. Weight loss also noted since last admission.   - follow up outpatient. Continue with lifestyle modifications. Nephrolithiasis: CT with non obstructing stones. Has h/o passing renal stones. UA with blood due to menstrual cycles.   - Follow up with nephrology OP        Radha Olvera MD  Family Medicine Resident       For Billing    Chief Complaint   Patient presents with    Vomiting    Chest Pain       Hospital Problems  Date Reviewed: 2/27/2023            Codes Class Noted POA    Iron overload ICD-10-CM: E83.19  ICD-9-CM: 275.09  4/9/2023 Yes        * (Principal) Increased anion gap metabolic acidosis [facility]-25-DN: E87.29  ICD-9-CM: 276.2  4/9/2023 Yes        Chest pain ICD-10-CM: R07.9  ICD-9-CM: 786.50  4/8/2023 Yes        Elevated troponin ICD-10-CM: R77.8  ICD-9-CM: 790.6  4/8/2023 Yes        Sepsis ([address] ICD-10-CM: A41.9  ICD-9-CM: 038.9, 995.91  4/8/2023 Yes        Iron deficiency anemia ICD-10-CM: D50.9  ICD-9-CM: 280.9  3/3/2023 Yes        Critical illness neuropathy ([address] ICD-10-CM: G62.81  ICD-9-CM: 357.82  11/10/2022 Yes        Hypertension ICD-10-CM: I10  ICD-9-CM: 401.9  11/10/2022 Yes        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

## 2023-04-10 NOTE — PROGRESS NOTES
1015 HR into 130s when up to the bathroom, returned to bed back to 108 at rest, patient denies complaints of until back to bed then states \"I can feel it beating faster now, but not earlier. 1230 Patient asked for GI cocktail before each meal, family practice notified. 1630 HR up to 147 when up to bathroom, back to 106 when returned to bed.

## 2023-04-10 NOTE — PROGRESS NOTES
Ultrasound IV by Gayla Stevens RN :  Procedure Note    Ultrasound IV education provided to patient. Opportunities for questions given. Ultrasound used for PIV placement:  20 gauge 6 cm Arrow Endurance Extended Dwell(may stay in place for 29 days)  Left Cephalic location. 1 X Attempt(s). Flushed with ease; vigorous blood return. Procedure tolerated well. Primary RN aware of IV placement and added to LDA.       Romaine Wilkinson RN

## 2023-04-10 NOTE — PROGRESS NOTES
Problem: Falls - Risk of  Goal: *Absence of Falls  Description: Document Dilip Comment Fall Risk and appropriate interventions in the flowsheet.   4/9/2023 2144 by Temo Gusman RN  Outcome: Progressing Towards Goal  Note: Fall Risk Interventions:                             4/9/2023 2144 by Temo Gusman RN  Outcome: Progressing Towards Goal  Note: Fall Risk Interventions:                                Problem: Patient Education: Go to Patient Education Activity  Goal: Patient/Family Education  4/9/2023 2144 by Temo Gusman RN  Outcome: Progressing Towards Goal  4/9/2023 2144 by Temo Gusman RN  Outcome: Progressing Towards Goal     Problem: Pain  Goal: *Control of Pain  4/9/2023 2144 by Temo Gusman RN  Outcome: Progressing Towards Goal  4/9/2023 2144 by Temo Gusman RN  Outcome: Progressing Towards Goal     Problem: Patient Education: Go to Patient Education Activity  Goal: Patient/Family Education  4/9/2023 2144 by Temo Gusman RN  Outcome: Progressing Towards Goal  4/9/2023 2144 by Temo Gusman RN  Outcome: Progressing Towards Goal

## 2023-04-11 VITALS
SYSTOLIC BLOOD PRESSURE: 139 MMHG | RESPIRATION RATE: 18 BRPM | HEIGHT: 63 IN | HEART RATE: 96 BPM | WEIGHT: 162.8 LBS | BODY MASS INDEX: 28.84 KG/M2 | DIASTOLIC BLOOD PRESSURE: 92 MMHG | OXYGEN SATURATION: 96 % | TEMPERATURE: 99.6 F

## 2023-04-11 LAB
ACETONE,ACETX: 30 MG/L
ALBUMIN SERPL-MCNC: 2.9 G/DL (ref 3.5–5)
ALBUMIN/GLOB SERPL: 0.8 (ref 1.1–2.2)
ALP SERPL-CCNC: 66 U/L (ref 45–117)
ALT SERPL-CCNC: 46 U/L (ref 12–78)
ANION GAP SERPL CALC-SCNC: 4 MMOL/L (ref 5–15)
ANION GAP SERPL CALC-SCNC: 6 MMOL/L (ref 5–15)
AST SERPL-CCNC: 59 U/L (ref 15–37)
BASOPHILS # BLD: 0 K/UL (ref 0–0.1)
BASOPHILS NFR BLD: 0 % (ref 0–1)
BILIRUB SERPL-MCNC: 0.5 MG/DL (ref 0.2–1)
BUN SERPL-MCNC: 5 MG/DL (ref 6–20)
BUN SERPL-MCNC: 5 MG/DL (ref 6–20)
BUN/CREAT SERPL: 11 (ref 12–20)
BUN/CREAT SERPL: 11 (ref 12–20)
CALCIUM SERPL-MCNC: 8.2 MG/DL (ref 8.5–10.1)
CALCIUM SERPL-MCNC: 8.3 MG/DL (ref 8.5–10.1)
CHAIN OF CUSTODY,CHC: NO
CHLORIDE SERPL-SCNC: 95 MMOL/L (ref 97–108)
CHLORIDE SERPL-SCNC: 98 MMOL/L (ref 97–108)
CO2 SERPL-SCNC: 27 MMOL/L (ref 21–32)
CO2 SERPL-SCNC: 28 MMOL/L (ref 21–32)
CREAT SERPL-MCNC: 0.45 MG/DL (ref 0.55–1.02)
CREAT SERPL-MCNC: 0.46 MG/DL (ref 0.55–1.02)
DIFFERENTIAL METHOD BLD: ABNORMAL
EOSINOPHIL # BLD: 0 K/UL (ref 0–0.4)
EOSINOPHIL NFR BLD: 0 % (ref 0–7)
ETHANOL,ETHX: <10 MG/L
ETHYLENE GLYCOL,XEGLYT: ABNORMAL MG/L
GLOBULIN SER CALC-MCNC: 3.5 G/DL (ref 2–4)
GLUCOSE SERPL-MCNC: 116 MG/DL (ref 65–100)
GLUCOSE SERPL-MCNC: 131 MG/DL (ref 65–100)
HCT VFR BLD AUTO: 32.9 % (ref 35–47)
HGB BLD-MCNC: 10.6 G/DL (ref 11.5–16)
IMM GRANULOCYTES # BLD AUTO: 0 K/UL (ref 0–0.04)
IMM GRANULOCYTES NFR BLD AUTO: 0 % (ref 0–0.5)
ISOPROPANOL,ISOPX: NEGATIVE MG/L
LYMPHOCYTES # BLD: 1.7 K/UL (ref 0.8–3.5)
LYMPHOCYTES NFR BLD: 19 % (ref 12–49)
MAGNESIUM SERPL-MCNC: 2.4 MG/DL (ref 1.6–2.4)
MCH RBC QN AUTO: 24.9 PG (ref 26–34)
MCHC RBC AUTO-ENTMCNC: 32.2 G/DL (ref 30–36.5)
MCV RBC AUTO: 77.2 FL (ref 80–99)
METHANOL,METHX: NEGATIVE MG/L
MONOCYTES # BLD: 1.2 K/UL (ref 0–1)
MONOCYTES NFR BLD: 13 % (ref 5–13)
NEUTS SEG # BLD: 6.1 K/UL (ref 1.8–8)
NEUTS SEG NFR BLD: 68 % (ref 32–75)
NRBC # BLD: 0 K/UL (ref 0–0.01)
NRBC BLD-RTO: 0 PER 100 WBC
PLATELET # BLD AUTO: 233 K/UL (ref 150–400)
PMV BLD AUTO: 10.1 FL (ref 8.9–12.9)
POTASSIUM SERPL-SCNC: 2.8 MMOL/L (ref 3.5–5.1)
POTASSIUM SERPL-SCNC: 3.7 MMOL/L (ref 3.5–5.1)
PROT SERPL-MCNC: 6.4 G/DL (ref 6.4–8.2)
RBC # BLD AUTO: 4.26 M/UL (ref 3.8–5.2)
RBC MORPH BLD: ABNORMAL
SODIUM SERPL-SCNC: 127 MMOL/L (ref 136–145)
SODIUM SERPL-SCNC: 131 MMOL/L (ref 136–145)
SPECIMEN SOURCE: ABNORMAL
WBC # BLD AUTO: 9 K/UL (ref 3.6–11)

## 2023-04-11 PROCEDURE — 74011250636 HC RX REV CODE- 250/636: Performed by: STUDENT IN AN ORGANIZED HEALTH CARE EDUCATION/TRAINING PROGRAM

## 2023-04-11 PROCEDURE — 80053 COMPREHEN METABOLIC PANEL: CPT

## 2023-04-11 PROCEDURE — 74011000250 HC RX REV CODE- 250: Performed by: STUDENT IN AN ORGANIZED HEALTH CARE EDUCATION/TRAINING PROGRAM

## 2023-04-11 PROCEDURE — C9113 INJ PANTOPRAZOLE SODIUM, VIA: HCPCS | Performed by: STUDENT IN AN ORGANIZED HEALTH CARE EDUCATION/TRAINING PROGRAM

## 2023-04-11 PROCEDURE — 74011250637 HC RX REV CODE- 250/637: Performed by: STUDENT IN AN ORGANIZED HEALTH CARE EDUCATION/TRAINING PROGRAM

## 2023-04-11 PROCEDURE — 74011250636 HC RX REV CODE- 250/636

## 2023-04-11 PROCEDURE — 85025 COMPLETE CBC W/AUTO DIFF WBC: CPT

## 2023-04-11 PROCEDURE — 36415 COLL VENOUS BLD VENIPUNCTURE: CPT

## 2023-04-11 PROCEDURE — 94761 N-INVAS EAR/PLS OXIMETRY MLT: CPT

## 2023-04-11 PROCEDURE — 99238 HOSP IP/OBS DSCHRG MGMT 30/<: CPT | Performed by: FAMILY MEDICINE

## 2023-04-11 PROCEDURE — 74011000258 HC RX REV CODE- 258: Performed by: STUDENT IN AN ORGANIZED HEALTH CARE EDUCATION/TRAINING PROGRAM

## 2023-04-11 PROCEDURE — 83735 ASSAY OF MAGNESIUM: CPT

## 2023-04-11 PROCEDURE — 74011250637 HC RX REV CODE- 250/637

## 2023-04-11 RX ORDER — POTASSIUM CHLORIDE 7.45 MG/ML
10 INJECTION INTRAVENOUS
Status: COMPLETED | OUTPATIENT
Start: 2023-04-11 | End: 2023-04-11

## 2023-04-11 RX ORDER — SODIUM CHLORIDE AND POTASSIUM CHLORIDE 300; 900 MG/100ML; MG/100ML
INJECTION, SOLUTION INTRAVENOUS CONTINUOUS
Status: DISCONTINUED | OUTPATIENT
Start: 2023-04-11 | End: 2023-04-11 | Stop reason: HOSPADM

## 2023-04-11 RX ORDER — BENZONATATE 100 MG/1
100 CAPSULE ORAL
Status: DISCONTINUED | OUTPATIENT
Start: 2023-04-11 | End: 2023-04-11 | Stop reason: HOSPADM

## 2023-04-11 RX ORDER — SUCRALFATE 1 G/1
1 TABLET ORAL 4 TIMES DAILY
Qty: 360 TABLET | Refills: 0 | Status: SHIPPED | OUTPATIENT
Start: 2023-04-11 | End: 2023-07-10

## 2023-04-11 RX ORDER — SUCRALFATE 1 G/1
1 TABLET ORAL 4 TIMES DAILY
Status: DISCONTINUED | OUTPATIENT
Start: 2023-04-11 | End: 2023-04-11 | Stop reason: HOSPADM

## 2023-04-11 RX ORDER — BENZONATATE 100 MG/1
100 CAPSULE ORAL
Qty: 7 CAPSULE | Refills: 0 | Status: SHIPPED | OUTPATIENT
Start: 2023-04-11 | End: 2023-04-18

## 2023-04-11 RX ORDER — POTASSIUM CHLORIDE 20 MEQ/1
20 TABLET, EXTENDED RELEASE ORAL 2 TIMES DAILY
Qty: 60 TABLET | Refills: 0 | Status: SHIPPED | OUTPATIENT
Start: 2023-04-11 | End: 2023-05-11

## 2023-04-11 RX ORDER — PANTOPRAZOLE SODIUM 40 MG/1
40 TABLET, DELAYED RELEASE ORAL 2 TIMES DAILY
Qty: 180 TABLET | Refills: 0 | Status: SHIPPED | OUTPATIENT
Start: 2023-04-11 | End: 2023-07-10

## 2023-04-11 RX ADMIN — Medication 10 ML: at 08:18

## 2023-04-11 RX ADMIN — CEFEPIME 2 G: 20 INJECTION, POWDER, FOR SOLUTION INTRAVENOUS at 02:25

## 2023-04-11 RX ADMIN — PANTOPRAZOLE SODIUM 40 MG: 40 INJECTION, POWDER, FOR SOLUTION INTRAVENOUS at 08:05

## 2023-04-11 RX ADMIN — METRONIDAZOLE 500 MG: 500 INJECTION, SOLUTION INTRAVENOUS at 12:13

## 2023-04-11 RX ADMIN — ONDANSETRON 4 MG: 2 INJECTION INTRAMUSCULAR; INTRAVENOUS at 14:08

## 2023-04-11 RX ADMIN — POTASSIUM CHLORIDE 10 MEQ: 10 INJECTION, SOLUTION INTRAVENOUS at 10:53

## 2023-04-11 RX ADMIN — POTASSIUM CHLORIDE 10 MEQ: 10 INJECTION, SOLUTION INTRAVENOUS at 07:00

## 2023-04-11 RX ADMIN — ENOXAPARIN SODIUM 70 MG: 100 INJECTION SUBCUTANEOUS at 12:11

## 2023-04-11 RX ADMIN — BENZONATATE 100 MG: 100 CAPSULE ORAL at 08:05

## 2023-04-11 RX ADMIN — SODIUM CHLORIDE 100 ML/HR: 900 INJECTION, SOLUTION INTRAVENOUS at 02:24

## 2023-04-11 RX ADMIN — SUCRALFATE 1 G: 1 TABLET ORAL at 12:11

## 2023-04-11 RX ADMIN — POTASSIUM CHLORIDE AND SODIUM CHLORIDE: 900; 300 INJECTION, SOLUTION INTRAVENOUS at 08:06

## 2023-04-11 RX ADMIN — SUCRALFATE 1 G: 1 TABLET ORAL at 08:05

## 2023-04-11 RX ADMIN — POTASSIUM CHLORIDE 10 MEQ: 10 INJECTION, SOLUTION INTRAVENOUS at 09:30

## 2023-04-11 RX ADMIN — CYCLOBENZAPRINE 10 MG: 10 TABLET, FILM COATED ORAL at 08:05

## 2023-04-11 RX ADMIN — CEFEPIME 2 G: 20 INJECTION, POWDER, FOR SOLUTION INTRAVENOUS at 11:13

## 2023-04-11 RX ADMIN — POTASSIUM CHLORIDE 10 MEQ: 10 INJECTION, SOLUTION INTRAVENOUS at 12:14

## 2023-04-11 RX ADMIN — POTASSIUM CHLORIDE 10 MEQ: 10 INJECTION, SOLUTION INTRAVENOUS at 08:07

## 2023-04-11 NOTE — PROGRESS NOTES
Problem: Falls - Risk of  Goal: *Absence of Falls  Description: Document Yasir Upton Fall Risk and appropriate interventions in the flowsheet.   Outcome: Progressing Towards Goal  Note: Fall Risk Interventions:                                Problem: Patient Education: Go to Patient Education Activity  Goal: Patient/Family Education  Outcome: Progressing Towards Goal     Problem: Pain  Goal: *Control of Pain  Outcome: Progressing Towards Goal     Problem: Patient Education: Go to Patient Education Activity  Goal: Patient/Family Education  Outcome: Progressing Towards Goal

## 2023-04-11 NOTE — DISCHARGE SUMMARY
[address] [place] [address] [address] [place] [address]   Office [phone]  Fax [phone]       Discharge Note     Name: Julián Barr MRN: [id number]  Sex: Female   YOB: 1985  Age: 30 y.o. PCP: Gio Titus MD     Date of admission: 4/8/2023  Date of discharge: 4/11/2023    Attending physician at admission: Manasa Talbert. Attending physician at discharge: Mirna Barton, *  Resident physician at discharge: Soraya Bhat MD     Consultants during hospitalization  IP CONSULT TO [facility]     Admission diagnoses   Sepsis [facility]) [A41.9]  Acidosis [E87.20]  Elevated troponin [R77.8]  Chest pain [R07.9]    Recommended follow-up after discharge    1. Gildardo Ballard MD  2. Gastroenterology  3. Nephrology  4. Hematology     Things to follow up on with PCP:   - Monitor for Yersinia colitis post chelation  - Potassium levels  - Monitor BP and start medicines    Medication Changes:  Current Discharge Medication List        START taking these medications    Details   benzonatate (TESSALON) 100 mg capsule Take 1 Capsule by mouth three (3) times daily as needed for Cough for up to 7 days. Qty: 7 Capsule, Refills: 0  Start date: 4/11/2023, End date: 4/18/2023      sucralfate (CARAFATE) 1 gram tablet Take 1 Tablet by mouth four (4) times daily for 90 days. Qty: 360 Tablet, Refills: 0  Start date: 4/11/2023, End date: 7/10/2023      potassium chloride (K-DUR, KLOR-CON M20) 20 mEq tablet Take 1 Tablet by mouth two (2) times a day for 30 days. Qty: 60 Tablet, Refills: 0  Start date: 4/11/2023, End date: 5/11/2023      pantoprazole (PROTONIX) 40 mg tablet Take 1 Tablet by mouth two (2) times a day for 90 days. Qty: 180 Tablet, Refills: 0  Start date: 4/11/2023, End date: 7/10/2023           CONTINUE these medications which have NOT CHANGED    Details   rivaroxaban (XARELTO) 20 mg tab tablet Take  by mouth daily. multivitamin (ONE A DAY) tablet Take 1 Tablet by mouth daily. STOP taking these medications       ferrous sulfate (IRON PO) Comments:   Reason for Stopping:         hydroCHLOROthiazide (HYDRODIURIL) 25 mg tablet Comments:   Reason for Stopping:         ondansetron (ZOFRAN ODT) 4 mg disintegrating tablet Comments:   Reason for Stopping:         ondansetron (ZOFRAN ODT) 4 mg disintegrating tablet Comments:   Reason for Stopping:                   History of Present Illness  Lorraine Smith is a 30 y.o. female with PMHx of HTN, DVT, neuropathy, h/o alcoholic pancreatitis, h/o hepatic steatosis, KWABENA who presents to the ED complaining of persistent nausea and vomiting with associated epigastric abdominal pain over the last 3-4 days. Felt more weak and \"out of it today\" which brought her into the ED. Notes symptoms began the evening following her last iron infusion on 4/5. Subsequently developed burning chest pain and sore throat which she attributes to vomiting. Vomiting has been dark in color at times without obvious blood. Did have fever to 102F which resolved with tylenol. She has been unable to tolerate oral intake since onset. Denies diarrhea, constipation, extremity pain, shortness of breath. Denies use of NSAIDS or alcohol use. Does report use of delta-8 gummies daily for sleep. As per admitting provider, Dr. Fco Pina, [facility] 19 was admitted for sepsis and anion gap metabolic acidosis 2/2 iron transfusions. Deferoxime was given and iron levels normalized. She will follow up with hematology for further iron management. Nausea and vomiting resolved with anti emetics. Has h/o chronic gastritis and will follow up with GI for a EGD outpatient. She tolerated a regular diet at discharge. Anion gap metabolic acidosis: Resolved. POA AG 19. Likely 2/2 iron infusions and oral iron intake. Iron level > 500. Deferoxamine given, iron levels normalized. IVF given, acidosis resolved.   - PCP: Monitor for Yersinia colitis 3-4 weeks post chelation     Sepsis: Resolved. 2/4 SIRS. Likely secondary to dehydration S/p 30 cc/kg. Likely GI origin. S/p vanc (4/8-10), cefepime and flagyl (4/8- 11). Bcx, Ucx, throat cx NGTD. No antibiotics at discharge. Hypokalemia: Likely 2/2 acidosis. HCTZ discontinued in s/o low BP. Has needed potassium supplements previously. - Continue daily potassium supplements 40 mEq daily  - PCP: recheck potassium    Iron deficiency anemia: F/w Dr Pee Gonzalez MD [facility]). Recently completed course of IV Venofer infusions x 5, also taking additional oral iron supplementation. Admitted for [address] s/p iron infusions  - Discontinued oral iron   - Follow up with hematology OP for further management     Epigastric pain: chronic for > 10 years. Told to have gastritis. Lipase wnl. Nausea and vomiting resolved with compazine. She tolerated a regular diet at discharge. F/w [employer]  - Follow up with GI for EGD outpatient  - Continue Protonix 40 mg BID and Carafate 1 gm QID. HTN:   - Held home HCTZ 25 mg in s/o low BP and hypokalemia  - PCP: monitor BP and starting medicines     Elevated troponin: EKG with sinus tachycardia, no obvious acute ST abnormality noted. Trop flat     Tachycardia: Resolved likely secondary to sepsis and IVVD. CTA chest no PE     Acute Kidney Injury: Resolved BL 0.6. Likely 2/2 IVVD in setting of persistent vomiting and decreased oral intake. Hx unprovoked DVT: Upper extremity in 2019.    - Continue Xarelto 20 mg daily     Hx neuropathy: Chronic. Reported development after prior hospitalization in [place] in 0542 after metabolic seizure with renal failure requiring CRRT. Improved, though still with persistent left foot drop per patient. - Continue follow up with neurology OP as needed     Hx alcoholic pancreatitis:Admitted in 11/22. Denies alcohol use since that time. Lipase wnl     Hepatic steatosis: On CT on admission.   - Continue lifestyle modifications. Nephrolithiasis: CT with non obstructing stones.  Has h/o passing renal stones. UA with blood due to menstrual cycles. - Follow up with nephrology OP        Visit Vitals  BP (!) 154/92 (BP 1 Location: Right upper arm, BP Patient Position: At rest)   Pulse 96   Temp 99.6 °F (37.6 °C)   Resp 18   Ht 5' 3\" (1.6 m)   Wt 162 lb 12.8 oz (73.8 kg)   SpO2 96%   BMI 28.84 kg/m²       Physical Examination:  General: No acute distress  Head: Normocephalic, atraumatic  Eyes: Conjunctiva pink  Neck: Supple  ENT: No rhinorrhea  CV: Heart: regular rate, rhythm. Resp: Lungs: clear to auscultation bilaterally, no wheeze crackles  GI: + bowel sounds, non-tender to palpation, non-distended. Back: No CVA tenderness  Extremities: No lower extremity edema  Skin: Warm, dry  Neuro: Awake, alert, and oriented, no focal deficits        Condition at discharge: Stable. Labs  Recent Labs     04/11/23 0410 04/10/23  0021 04/09/23  0020   WBC 9.0 13.0* 26.3*   HGB 10.6* 10.9* 12.0   HCT 32.9* 34.3* 39.0    268 312     Recent Labs     04/11/23  1416 04/11/23  0410 04/10/23  1941 04/10/23  1116   * 131* 130* 134*   K 3.7 2.8* 2.9* 2.2*   CL 95* 98 96* 101   CO2 28 27 28 26   BUN 5* 5* 5* 4*   CREA 0.46* 0.45* 0.55 0.53*   * 116* 149* 149*   CA 8.3* 8.2* 8.5 8.3*   MG  --  2.4  --  1.4*     Recent Labs     04/11/23  0410 04/10/23  0021 04/09/23  0020   ALT 46 27 28   AP 66 72 82   TBILI 0.5 1.1* 1.2*   TP 6.4 6.9 7.5   ALB 2.9* 3.2* 3.6   GLOB 3.5 3.7 3.9     Recent Labs     04/09/23  0139 04/08/23  2130   INR  --  1.0   PTP  --  10.8   APTT  --  26.9   TIBC  --  277   PSAT  --  34   GLUCPOC 116*  --        Micro:  Lab Results   Component Value Date/Time    Culture result:  04/08/2023 08:34 PM     MRSA NOT PRESENT. Apparent Staphylococus aureus (not MRSA noted). Culture result:  04/08/2023 08:34 PM     Screening of patient nares for MRSA is for surveillance purposes and, if positive, to facilitate isolation considerations in high risk settings.  It is not intended for automatic decolonization interventions per se as regimens are not sufficiently effective to warrant routine use. Culture result: No significant growth, <10,000 CFU/mL 04/08/2023 03:15 PM       Imaging:  XR CHEST PA LAT    Result Date: 4/8/2023  EXAM: XR CHEST PA LAT INDICATION: Chest pain, fever COMPARISON: None TECHNIQUE: PA and lateral chest views FINDINGS: The cardiac size is within normal limits. The pulmonary vasculature is within normal limits. The lungs and pleural spaces are clear. The visualized bones and upper abdomen are age-appropriate. Normal PA and lateral chest views. CTA CHEST W OR W WO CONT    Result Date: 4/8/2023  EXAM:  CTA CHEST W OR W WO CONT INDICATION: Rule out pulmonary embolism COMPARISON: Chest radiograph 4/8/2023. TECHNIQUE: Helical thin section chest CT following uneventful intravenous administration of nonionic contrast 62 mL of isovue 370 according to departmental PE protocol. Coronal and sagittal reformats were performed. 3D post processing was performed. CT dose reduction was achieved through the use of a standardized protocol tailored for this examination and automatic exposure control for dose modulation. FINDINGS: This is a good quality study for the evaluation of pulmonary embolism to the first subsegmental arterial level. There is no pulmonary embolism to this level. THYROID: No nodule. MEDIASTINUM: No mass or lymphadenopathy. DOMINGA: No mass or lymphadenopathy. THORACIC AORTA: No aneurysm. HEART: Normal in size. ESOPHAGUS: No wall thickening or dilatation. TRACHEA/BRONCHI: Patent. PLEURA: No effusion or pneumothorax. LUNGS: No nodule, mass, or airspace disease. UPPER ABDOMEN: Partially imaged. No acute pathology. BONES: No aggressive bone lesion or fracture. No pulmonary embolism or other acute abnormality in the chest.     CT ABD PELV W CONT    Result Date: 4/8/2023  INDICATION: Vomiting, abdominal pain. CT of the abdomen and pelvis is performed with 5 mm collimation. Study is performed with PO and 100 cc of nonionic Isovue 370. Sagittal and coronal reformatted images were also performed. CT dose reduction was achieved with the use of the standardized protocol tailored for this examination and automatic exposure control for dose modulation. Direct comparison is made to prior CT dated December 2022. Findings: Lung bases: The visualized lung bases are clear. Liver: There is diffuse fatty infiltration of the liver. Adrenals: Adrenal glands are normal. Pancreas: The pancreas is normal. Gallbladder: The gallbladder is normal. Kidneys: There are several punctate nonobstructing renal calculi. There is no hydronephrosis. Spleen: Splenules are noted within the left upper quadrant. Lymph nodes. There is no sonia hepatitis, mesenteric, retroperitoneal or pelvic lymphadenopathy. Bowel: No thickened or dilated loop of large or small bowel is visualized. Appendix: The appendix is not visualized, however there are no secondary signs of acute appendicitis. Urinary bladder: Urinary bladder is partially filled and grossly normal. Miscellaneous: There is no free intraperitoneal fluid or gas. There is no focal fluid collection to suggest abscess. 1. No bowel obstruction, ileus or perforation. No intra-abdominal abscess. 2. Diffuse hepatic steatosis. 3. Nonobstructing bilateral nephrolithiasis.       Procedures / Diagnostic Studies  None    Chronic diagnoses   Problem List as of 4/11/2023 Date Reviewed: 2/27/2023            Codes Class Noted - Resolved    Iron overload ICD-10-CM: E83.19  ICD-9-CM: 275.09  4/9/2023 - Present        * (Principal) Increased anion gap metabolic acidosis [id number]: E87.29  ICD-9-CM: 276.2  4/9/2023 - Present        Chest pain ICD-10-CM: R07.9  ICD-9-CM: 786.50  4/8/2023 - Present        Elevated troponin ICD-10-CM: R77.8  ICD-9-CM: 790.6  4/8/2023 - Present        Sepsis ([address] ICD-10-CM: A41.9  ICD-9-CM: 038.9, 995.91  4/8/2023 - Present        Iron deficiency anemia ICD-10-CM: D50.9  ICD-9-CM: 280.9  3/3/2023 - Present        Thrombocytosis ICD-10-CM: D75.839  ICD-9-CM: 238.71  2/27/2023 - Present        RBC microcytosis ICD-10-CM: R71.8  ICD-9-CM: 790.09  2/27/2023 - Present        Other fatigue ICD-10-CM: R53.83  ICD-9-CM: 780.79  2/27/2023 - Present        Left foot drop ICD-10-CM: M21.372  ICD-9-CM: 736.79  11/10/2022 - Present        Critical illness neuropathy ([facility] [address] ICD-10-CM: G62.81  ICD-9-CM: 357.82  11/10/2022 - Present        Hypertension ICD-10-CM: I10  ICD-9-CM: 401.9  11/10/2022 - Present        Acute cystitis ICD-10-CM: N30.00  ICD-9-CM: 595.0  11/10/2022 - Present        Obesity ICD-10-CM: E66.9  ICD-9-CM: 278.00  11/10/2022 - Present        Pancreatitis ICD-10-CM: K85.90  ICD-9-CM: 029.1  11/9/2022 - Present        Spleen absent ICD-10-CM: Q89.01  ICD-9-CM: 759.0  10/12/2021 - Present        History of blood clots ICD-10-CM: R69.182  ICD-9-CM: V12.51  10/12/2021 - Present        History of appendectomy ICD-10-CM: Z90.49  ICD-9-CM: V45.89  10/12/2021 - Present        RESOLVED: Acidosis ICD-10-CM: E87.20  ICD-9-CM: 276.2  4/8/2023 - 4/9/2023       Diet:  Regular diet.     Activity:  As tolerated     Disposition: Home or Self Care    Discharge instructions to patient/family  Please seek medical attention for any new or worsening symptoms particularly abdominal pain, nausea, vomiting    Follow up plans/appointments  Follow-up Information       Follow up With Specialties Details Why Contact Chayito Sofia NP Nurse Practitioner Follow up on 4/21/2023 9:20 pm, hospital follow up [facility] [place] [facility]-      Robbie Mcardle, MD Family Medicine   [address] [place] [address]  [phone]               Nick Lou MD  Family Medicine Resident       For Billing    Chief Complaint   Patient presents with    Vomiting    Chest Pain       Hospital Problems  Date Reviewed: 2/27/2023            Codes Class Noted POA    Iron overload ICD-10-CM: E83.19  ICD-9-CM: 275.09  4/9/2023 Yes        * (Principal) Increased anion gap metabolic acidosis [id number]: E87.29  ICD-9-CM: 276.2  4/9/2023 Yes        Chest pain ICD-10-CM: R07.9  ICD-9-CM: 786.50  4/8/2023 Yes        Elevated troponin ICD-10-CM: R77.8  ICD-9-CM: 790.6  4/8/2023 Yes        Sepsis ([facility] ICD-10-CM: A41.9  ICD-9-CM: 038.9, 995.91  4/8/2023 Yes        Iron deficiency anemia ICD-10-CM: D50.9  ICD-9-CM: 280.9  3/3/2023 Yes        Critical illness neuropathy ([facility] ICD-10-CM: G62.81  ICD-9-CM: 357.82  11/10/2022 Yes        Hypertension ICD-10-CM: I10  ICD-9-CM: 401.9  11/10/2022 Yes        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

## 2023-04-11 NOTE — PROGRESS NOTES
0730 Bedside and Verbal shift change report given to April RN (oncoming nurse) by Karen Lynne RN (offgoing nurse). Report included the following information SBAR and Kardex. This patient was assisted with Intentional Toileting every 2 hours during this shift as appropriate. Documentation of ambulation and output reflected on Flowsheet as appropriate. Purposeful hourly rounding was completed using AIDET and 5Ps. Outcomes of PHR documented as they occurred. Bed alarm in use as appropriate. Dual Suction and ambubag in place.

## 2023-04-11 NOTE — PROGRESS NOTES
Transition of Care Plan: RUR-11%  Medical management continues  Continues with abx and protonix  CM following for d/c needs-none anticipated  Pt's spouse to transport her home at d/c  Pt to follow up OP  LAM Mcnamara

## 2023-04-12 LAB
ATRIAL RATE: 104 BPM
ATRIAL RATE: 107 BPM
CALCULATED P AXIS, ECG09: 21 DEGREES
CALCULATED R AXIS, ECG10: 131 DEGREES
CALCULATED R AXIS, ECG10: 48 DEGREES
CALCULATED T AXIS, ECG11: 165 DEGREES
CALCULATED T AXIS, ECG11: 27 DEGREES
DIAGNOSIS, 93000: NORMAL
DIAGNOSIS, 93000: NORMAL
P-R INTERVAL, ECG05: 160 MS
Q-T INTERVAL, ECG07: 368 MS
Q-T INTERVAL, ECG07: 372 MS
QRS DURATION, ECG06: 74 MS
QRS DURATION, ECG06: 78 MS
QTC CALCULATION (BEZET), ECG08: 489 MS
QTC CALCULATION (BEZET), ECG08: 491 MS
VENTRICULAR RATE, ECG03: 104 BPM
VENTRICULAR RATE, ECG03: 107 BPM

## 2023-04-13 LAB
BACTERIA SPEC CULT: NORMAL
SERVICE CMNT-IMP: NORMAL

## 2023-04-14 NOTE — PROGRESS NOTES
Physician Progress Note      [id number]  :                       1985  ADMIT DATE:       2023 12:13 PM  DISCH DATE:        2023 5:18 PM  RESPONDING  PROVIDER #:        BRITNEY IVORY MD          QUERY TEXT:    Patient admitted with anion gap metabolic acidosis, dehydration, and NELL. Noted documentation of sepsis in H&P and PN without obvious source identified and DCS notes likely GI origin. If possible, please document in progress notes and discharge summary the source of sepsis:    The medical record reflects the following:  Risk Factors: acute illness  Clinical Indicators: to ED for eval of N/V, fever, and sore throat  - WBC 25.2 on admission with HR >90  - admitted with AGMA, dehydration, and NELL  - H&P notes sepsis but no obvious source  -  and 4/10 PN with unclear etiology  - DCS: Sepsis: Resolved.  SIRS. Likely secondary to dehydration S/p 30 cc/kg. Likely GI origin. S/p vanc (4/8-10), cefepime and flagyl (). Bcx, Ucx, throat cx NGTD. No antibiotics at discharge  Treatment: NS bolus (2L), blood, throat, and urine cxs, maxipime 2 g IV, vanc IV, flagyl 500 mg IV    Thank you,    Delilah Shin RN  CDI  Options provided:  -- Sepsis, present on admission, due to, Please document source. -- SIRS only, sepsis was ruled out after study  -- Other - I will add my own diagnosis  -- Disagree - Not applicable / Not valid  -- Disagree - Clinically unable to determine / Unknown  -- Refer to Clinical Documentation Reviewer    PROVIDER RESPONSE TEXT:    SIRS only, sepsis was ruled out after study.     Query created by: Maren Panchal on 2023 3:07 PM      Electronically signed by:  Unice Dakin MD 2023 4:52 PM

## 2023-04-21 ENCOUNTER — OFFICE VISIT (OUTPATIENT)
Dept: FAMILY MEDICINE CLINIC | Age: 38
End: 2023-04-21
Payer: OTHER GOVERNMENT

## 2023-04-21 VITALS
HEIGHT: 63 IN | DIASTOLIC BLOOD PRESSURE: 77 MMHG | TEMPERATURE: 97.5 F | BODY MASS INDEX: 29.23 KG/M2 | OXYGEN SATURATION: 98 % | HEART RATE: 103 BPM | WEIGHT: 165 LBS | SYSTOLIC BLOOD PRESSURE: 113 MMHG

## 2023-04-21 DIAGNOSIS — R53.83 FATIGUE, UNSPECIFIED TYPE: ICD-10-CM

## 2023-04-21 DIAGNOSIS — I10 PRIMARY HYPERTENSION: ICD-10-CM

## 2023-04-21 DIAGNOSIS — E83.19 IRON OVERLOAD: ICD-10-CM

## 2023-04-21 DIAGNOSIS — E87.6 HYPOKALEMIA: ICD-10-CM

## 2023-04-21 DIAGNOSIS — L82.1 SEBORRHEIC KERATOSES: ICD-10-CM

## 2023-04-21 DIAGNOSIS — K21.9 GASTROESOPHAGEAL REFLUX DISEASE, UNSPECIFIED WHETHER ESOPHAGITIS PRESENT: ICD-10-CM

## 2023-04-21 DIAGNOSIS — Z09 HOSPITAL DISCHARGE FOLLOW-UP: Primary | ICD-10-CM

## 2023-04-21 PROCEDURE — 99214 OFFICE O/P EST MOD 30 MIN: CPT | Performed by: STUDENT IN AN ORGANIZED HEALTH CARE EDUCATION/TRAINING PROGRAM

## 2023-04-21 PROCEDURE — 3078F DIAST BP <80 MM HG: CPT | Performed by: STUDENT IN AN ORGANIZED HEALTH CARE EDUCATION/TRAINING PROGRAM

## 2023-04-21 PROCEDURE — 3074F SYST BP LT 130 MM HG: CPT | Performed by: STUDENT IN AN ORGANIZED HEALTH CARE EDUCATION/TRAINING PROGRAM

## 2023-04-21 PROCEDURE — 1111F DSCHRG MED/CURRENT MED MERGE: CPT | Performed by: STUDENT IN AN ORGANIZED HEALTH CARE EDUCATION/TRAINING PROGRAM

## 2023-04-21 NOTE — PROGRESS NOTES
Identified pt with two pt identifiers. Reviewed record in preparation for visit and have obtained necessary documentation. All patient medications has been reviewed. Chief Complaint   Patient presents with    Hospital Follow Up    Blood infection     Additional information about chief complaint:    Visit Vitals  /77 (BP 1 Location: Left upper arm, BP Patient Position: Sitting, BP Cuff Size: Adult)   Pulse (!) 103   Temp 97.5 °F (36.4 °C) (Temporal)   Ht 5' 3\" (1.6 m)   Wt 165 lb (74.8 kg)   SpO2 98%   BMI 29.23 kg/m²       Health Maintenance Due   Topic    Hepatitis C Screening     Varicella Vaccine (1 of 2 - 2-dose childhood series)    Shingles Vaccine (1 of 2)    DTaP/Tdap/Td series (1 - Tdap)    Cervical cancer screen     COVID-19 Vaccine (4 - Booster for Moderna series)       1. Have you been to the ER, urgent care clinic since your last visit? Hospitalized since your last visit? yes    2. Have you seen or consulted any other health care providers outside of the [address] [place] [address] since your last visit? Include any pap smears or colon screening.  no

## 2023-04-21 NOTE — PROGRESS NOTES
Assessment/Plan:     Diagnoses and all orders for this visit:    1. Hospital discharge follow-up  -Hospitalized from 4/8 through 4/11 due to sepsis with acidosis as well as iron overload  -Patient has discontinued oral iron and follow-up with hematology in 2 weeks  -Has already followed up with gastroenterology due to her GERD. Had EGD and per patient was unremarkable  -Medications reviewed and reconciled    2. Iron overload  -Discontinue oral iron  -Has follow-up with hematology in the beginning of May    3. Primary hypertension  -     METABOLIC PANEL, COMPREHENSIVE; Future  -     CBC WITH AUTOMATED DIFF; Future  -Chronic. Well-controlled  -Hydrochlorothiazide was discontinued due to significant hypokalemia  -Home blood pressure readings have remained normal despite being off medication  -Given how well controlled her blood pressures are at this time recommend remaining off antihypertensives at this time  -Continue monitoring home blood pressure readings and if noting increased recommend follow-up    4. Hypokalemia  -     METABOLIC PANEL, COMPREHENSIVE; Future  -     MAGNESIUM; Future  -Hypokalemia secondary to hydrochlorothiazide  -Has discontinued hydrochlorothiazide and is on potassium 20 mEq twice daily  -Check lab work today for reevaluation  -If potassium now normal will discontinue oral potassium and see if the discontinuation of hydrochlorothiazide was sufficient    5. Gastroesophageal reflux disease, unspecified whether esophagitis present  -Chronic. Stable. -Symptoms have resolved and are well controlled on Protonix and Carafate  -Had EGD completed earlier this week and per patient was normal    6. Fatigue, unspecified type  -     TSH 3RD GENERATION; Future  -     VITAMIN D, 25 HYDROXY; Future  -Chronic. Uncontrolled. Uncertain etiology  -Check basic lab work today  -If no apparent etiology noted recommend follow-up    7.  Seborrheic keratoses  -Andrew K noted on left lateral chest wall        Follow-up and Dispositions    Return if symptoms worsen or fail to improve. Discussed expected course/resolution/complications of diagnosis in detail with patient. Medication risks/benefits/costs/interactions/alternatives discussed with patient. Pt expressed understanding with the diagnosis and plan        Subjective:      Juaquin Brady is a 30 y.o. female who presents for had concerns including Hospital Follow Up and Blood infection. Current Outpatient Medications   Medication Sig Dispense Refill    sucralfate (CARAFATE) 1 gram tablet Take 1 Tablet by mouth four (4) times daily for 90 days. 360 Tablet 0    potassium chloride (K-DUR, KLOR-CON M20) 20 mEq tablet Take 1 Tablet by mouth two (2) times a day for 30 days. 60 Tablet 0    pantoprazole (PROTONIX) 40 mg tablet Take 1 Tablet by mouth two (2) times a day for 90 days. 180 Tablet 0    rivaroxaban (XARELTO) 20 mg tab tablet Take  by mouth daily. multivitamin (ONE A DAY) tablet Take 1 Tablet by mouth daily. Allergies   Allergen Reactions    Other Medication Hives     Sulfa drugs    Sulfa (Sulfonamide Antibiotics) Hives     Past Medical History:   Diagnosis Date    Neuropathy     Reportedly admitted in 2019 in [place]; felt to be related to prolonged hopsitalization. Pancreatitis 10/20/2022    ? of Alcohol per patient     Past Surgical History:   Procedure Laterality Date    HX APPENDECTOMY      HX  SECTION      HX SPLENECTOMY      reportedly after a splenic rupture     Family History   Problem Relation Age of Onset    Ovarian Cancer Mother         reportedly took chemotherapy and had surgery.     Hypertension Father     Other cancer Neg Hx         unaware of any hematologic malignancy     Social History     Socioeconomic History    Marital status:      Spouse name: Not on file    Number of children: Not on file    Years of education: Not on file    Highest education level: Not on file   Occupational History    Not on file Tobacco Use    Smoking status: Former     Packs/day: 1.00     Years: 15.00     Pack years: 15.00     Types: Cigarettes     Quit date: 2019     Years since quittin.3    Smokeless tobacco: Never   Vaping Use    Vaping Use: Never used   Substance and Sexual Activity    Alcohol use: Not Currently     Alcohol/week: 3.0 standard drinks     Types: 1 Glasses of wine, 1 Cans of beer, 1 Shots of liquor per week     Comment: ocassionally    Drug use: Never    Sexual activity: Not on file   Other Topics Concern    Not on file   Social History Narrative    Not on file     Social Determinants of Health     Financial Resource Strain: Not on file   Food Insecurity: Not on file   Transportation Needs: Not on file   Physical Activity: Not on file   Stress: Not on file   Social Connections: Not on file   Intimate Partner Violence: Not on file   Housing Stability: Not on file       Patient is a 29-year-old female who presents to the office today for hospital discharge follow-up. Patient was hospitalized from  through  due to sepsis with acidosis. She states that she was informed it was due to iron overload. She had discontinued her oral iron and was advised to follow with hematology who was administering IV iron. She also had persistent acid reflux with epigastric discomfort. She recently had an EGD completed earlier this week that was normal.  During her hospitalization she also history of hypertension and was previously on hydrochlorothiazide. She was noted to have significantly low potassium and therefore hydrochlorothiazide was discontinued. Her home blood pressure readings since being off of the medication have been within normal limits. Also of note patient states that since November she has had chronic and significant fatigue. She is uncertain of underlying cause. Denies feeling of depression or difficulty sleeping.  Finally of note she does states she has a small mole like spot on her left lateral chest wall under her bra strap. ROS:   Review of Systems   Constitutional:  Positive for malaise/fatigue. Negative for chills and fever. HENT:  Negative for congestion. Respiratory:  Negative for cough and shortness of breath. Cardiovascular:  Negative for chest pain and leg swelling. Gastrointestinal:  Positive for heartburn. Negative for abdominal pain, nausea and vomiting. Neurological:  Negative for dizziness, tingling, weakness and headaches. Objective:   Visit Vitals  /77 (BP 1 Location: Left upper arm, BP Patient Position: Sitting, BP Cuff Size: Adult)   Pulse (!) 103   Temp 97.5 °F (36.4 °C) (Temporal)   Ht 5' 3\" (1.6 m)   Wt 165 lb (74.8 kg)   LMP 03/29/2023 (Approximate)   SpO2 98%   BMI 29.23 kg/m²         Vitals and Nurse Documentation reviewed. Physical Exam  Constitutional:       General: She is not in acute distress. Appearance: Normal appearance. She is not toxic-appearing. HENT:      Head: Normocephalic and atraumatic. Eyes:      Conjunctiva/sclera: Conjunctivae normal.   Cardiovascular:      Rate and Rhythm: Normal rate and regular rhythm. Pulses: Normal pulses. Heart sounds: Normal heart sounds. No murmur heard. No gallop. Pulmonary:      Effort: Pulmonary effort is normal. No respiratory distress. Breath sounds: Normal breath sounds. No wheezing or rhonchi. Abdominal:      General: Bowel sounds are normal. There is no distension. Palpations: Abdomen is soft. Tenderness: There is no abdominal tenderness. There is no guarding. Musculoskeletal:      Cervical back: Neck supple. Right lower leg: No edema. Left lower leg: No edema. Skin:            Comments: Seborrheic keratosis noted on left lateral chest wall   Neurological:      Mental Status: She is alert and oriented to person, place, and time.       Comments: Has a brace on her left foot

## 2023-04-22 LAB
25(OH)D3 SERPL-MCNC: 20.2 NG/ML (ref 30–100)
ALBUMIN SERPL-MCNC: 3.5 G/DL (ref 3.5–5)
ALBUMIN/GLOB SERPL: 0.9 (ref 1.1–2.2)
ALP SERPL-CCNC: 101 U/L (ref 45–117)
ALT SERPL-CCNC: 80 U/L (ref 12–78)
ANION GAP SERPL CALC-SCNC: 6 MMOL/L (ref 5–15)
AST SERPL-CCNC: 41 U/L (ref 15–37)
BASOPHILS # BLD: 0.1 K/UL (ref 0–0.1)
BASOPHILS NFR BLD: 1 % (ref 0–1)
BILIRUB SERPL-MCNC: 0.2 MG/DL (ref 0.2–1)
BUN SERPL-MCNC: 11 MG/DL (ref 6–20)
BUN/CREAT SERPL: 18 (ref 12–20)
CALCIUM SERPL-MCNC: 9.5 MG/DL (ref 8.5–10.1)
CHLORIDE SERPL-SCNC: 102 MMOL/L (ref 97–108)
CO2 SERPL-SCNC: 25 MMOL/L (ref 21–32)
CREAT SERPL-MCNC: 0.61 MG/DL (ref 0.55–1.02)
DIFFERENTIAL METHOD BLD: ABNORMAL
EOSINOPHIL # BLD: 0 K/UL (ref 0–0.4)
EOSINOPHIL NFR BLD: 0 % (ref 0–7)
ERYTHROCYTE [DISTWIDTH] IN BLOOD BY AUTOMATED COUNT: 31.8 % (ref 11.5–14.5)
GLOBULIN SER CALC-MCNC: 3.7 G/DL (ref 2–4)
GLUCOSE SERPL-MCNC: 103 MG/DL (ref 65–100)
HCT VFR BLD AUTO: 38.6 % (ref 35–47)
HGB BLD-MCNC: 11.7 G/DL (ref 11.5–16)
IMM GRANULOCYTES # BLD AUTO: 0.1 K/UL (ref 0–0.04)
IMM GRANULOCYTES NFR BLD AUTO: 1 % (ref 0–0.5)
LYMPHOCYTES # BLD: 3.2 K/UL (ref 0.8–3.5)
LYMPHOCYTES NFR BLD: 32 % (ref 12–49)
MAGNESIUM SERPL-MCNC: 1.8 MG/DL (ref 1.6–2.4)
MCH RBC QN AUTO: 26.7 PG (ref 26–34)
MCHC RBC AUTO-ENTMCNC: 30.3 G/DL (ref 30–36.5)
MCV RBC AUTO: 87.9 FL (ref 80–99)
MONOCYTES # BLD: 1.2 K/UL (ref 0–1)
MONOCYTES NFR BLD: 12 % (ref 5–13)
NEUTS SEG # BLD: 5.3 K/UL (ref 1.8–8)
NEUTS SEG NFR BLD: 54 % (ref 32–75)
NRBC # BLD: 0 K/UL (ref 0–0.01)
NRBC BLD-RTO: 0 PER 100 WBC
PLATELET # BLD AUTO: 574 K/UL (ref 150–400)
PMV BLD AUTO: 10 FL (ref 8.9–12.9)
POTASSIUM SERPL-SCNC: 4.4 MMOL/L (ref 3.5–5.1)
PROT SERPL-MCNC: 7.2 G/DL (ref 6.4–8.2)
RBC # BLD AUTO: 4.39 M/UL (ref 3.8–5.2)
RBC MORPH BLD: ABNORMAL
SODIUM SERPL-SCNC: 133 MMOL/L (ref 136–145)
TSH SERPL DL<=0.05 MIU/L-ACNC: 1.35 UIU/ML (ref 0.36–3.74)
WBC # BLD AUTO: 9.9 K/UL (ref 3.6–11)

## 2023-04-30 RX ORDER — PANTOPRAZOLE SODIUM 40 MG/1
40 TABLET, DELAYED RELEASE ORAL 2 TIMES DAILY
Qty: 60 TABLET | Refills: 2 | COMMUNITY
Start: 2023-04-11 | End: 2023-06-26

## 2023-04-30 RX ORDER — SUCRALFATE 1 G/1
1 TABLET ORAL 4 TIMES DAILY
Qty: 120 TABLET | Refills: 2 | COMMUNITY
Start: 2023-04-11 | End: 2023-06-26

## 2023-05-09 PROBLEM — R77.8 ELEVATED TROPONIN: Status: RESOLVED | Noted: 2023-04-08 | Resolved: 2023-05-09

## 2023-05-09 PROBLEM — R79.89 ELEVATED TROPONIN: Status: RESOLVED | Noted: 2023-04-08 | Resolved: 2023-05-09

## 2023-05-16 ENCOUNTER — OFFICE VISIT (OUTPATIENT)
Facility: CLINIC | Age: 38
End: 2023-05-16
Payer: OTHER GOVERNMENT

## 2023-05-16 VITALS
TEMPERATURE: 97.4 F | DIASTOLIC BLOOD PRESSURE: 77 MMHG | SYSTOLIC BLOOD PRESSURE: 112 MMHG | HEART RATE: 84 BPM | RESPIRATION RATE: 16 BRPM | OXYGEN SATURATION: 98 % | WEIGHT: 169 LBS | HEIGHT: 63 IN | BODY MASS INDEX: 29.95 KG/M2

## 2023-05-16 DIAGNOSIS — Z01.818 PREOP EXAMINATION: Primary | ICD-10-CM

## 2023-05-16 DIAGNOSIS — Z86.718 HISTORY OF BLOOD CLOTS: ICD-10-CM

## 2023-05-16 PROBLEM — R07.9 CHEST PAIN: Status: RESOLVED | Noted: 2023-04-08 | Resolved: 2023-05-16

## 2023-05-16 PROBLEM — K85.90 PANCREATITIS: Status: RESOLVED | Noted: 2022-11-09 | Resolved: 2023-05-16

## 2023-05-16 PROBLEM — N30.00 ACUTE CYSTITIS: Status: RESOLVED | Noted: 2022-11-10 | Resolved: 2023-05-16

## 2023-05-16 PROBLEM — E55.9 VITAMIN D DEFICIENCY: Status: ACTIVE | Noted: 2023-05-16

## 2023-05-16 PROBLEM — E83.19 IRON OVERLOAD: Status: RESOLVED | Noted: 2023-04-09 | Resolved: 2023-05-16

## 2023-05-16 PROBLEM — E87.29 INCREASED ANION GAP METABOLIC ACIDOSIS: Status: RESOLVED | Noted: 2023-04-09 | Resolved: 2023-05-16

## 2023-05-16 PROBLEM — A41.9 SEPSIS (HCC): Status: RESOLVED | Noted: 2023-04-08 | Resolved: 2023-05-16

## 2023-05-16 PROCEDURE — 99213 OFFICE O/P EST LOW 20 MIN: CPT | Performed by: STUDENT IN AN ORGANIZED HEALTH CARE EDUCATION/TRAINING PROGRAM

## 2023-05-16 PROCEDURE — 3078F DIAST BP <80 MM HG: CPT | Performed by: STUDENT IN AN ORGANIZED HEALTH CARE EDUCATION/TRAINING PROGRAM

## 2023-05-16 PROCEDURE — 3074F SYST BP LT 130 MM HG: CPT | Performed by: STUDENT IN AN ORGANIZED HEALTH CARE EDUCATION/TRAINING PROGRAM

## 2023-05-16 RX ORDER — ACETAMINOPHEN 160 MG
1 TABLET,DISINTEGRATING ORAL DAILY
COMMUNITY

## 2023-05-16 SDOH — ECONOMIC STABILITY: HOUSING INSECURITY: IN THE LAST 12 MONTHS, HOW MANY PLACES HAVE YOU LIVED?: 1

## 2023-05-16 SDOH — ECONOMIC STABILITY: HOUSING INSECURITY
IN THE LAST 12 MONTHS, WAS THERE A TIME WHEN YOU DID NOT HAVE A STEADY PLACE TO SLEEP OR SLEPT IN A SHELTER (INCLUDING NOW)?: NO

## 2023-05-16 SDOH — ECONOMIC STABILITY: TRANSPORTATION INSECURITY
IN THE PAST 12 MONTHS, HAS THE LACK OF TRANSPORTATION KEPT YOU FROM MEDICAL APPOINTMENTS OR FROM GETTING MEDICATIONS?: YES

## 2023-05-16 SDOH — ECONOMIC STABILITY: INCOME INSECURITY: IN THE LAST 12 MONTHS, WAS THERE A TIME WHEN YOU WERE NOT ABLE TO PAY THE MORTGAGE OR RENT ON TIME?: NO

## 2023-05-16 SDOH — ECONOMIC STABILITY: TRANSPORTATION INSECURITY
IN THE PAST 12 MONTHS, HAS LACK OF TRANSPORTATION KEPT YOU FROM MEETINGS, WORK, OR FROM GETTING THINGS NEEDED FOR DAILY LIVING?: YES

## 2023-05-16 SDOH — ECONOMIC STABILITY: FOOD INSECURITY: WITHIN THE PAST 12 MONTHS, THE FOOD YOU BOUGHT JUST DIDN'T LAST AND YOU DIDN'T HAVE MONEY TO GET MORE.: NEVER TRUE

## 2023-05-16 SDOH — ECONOMIC STABILITY: FOOD INSECURITY: WITHIN THE PAST 12 MONTHS, YOU WORRIED THAT YOUR FOOD WOULD RUN OUT BEFORE YOU GOT MONEY TO BUY MORE.: SOMETIMES TRUE

## 2023-05-16 ASSESSMENT — SOCIAL DETERMINANTS OF HEALTH (SDOH): HOW HARD IS IT FOR YOU TO PAY FOR THE VERY BASICS LIKE FOOD, HOUSING, MEDICAL CARE, AND HEATING?: NOT VERY HARD

## 2023-05-16 NOTE — PROGRESS NOTES
Jase Greenberg  30 y.o. female  1985  AGS:[id number]  [facility] MEDICINE  Progress Note     Encounter Date: 2023      Preoperative Evaluation    Date of Exam: 2023    Jase Greenberg is a 30 y.o. female (:1985) who presents for preoperative evaluation. Patient was previously found unresponsive in 2019 and hospitalized for an extensive period. During that time she was noted to have 2 blood clots in her arm and also noted to have a small hole in her heart. She was therefore advised to continue on Xarelto indefinitely. Patient denies any further blood clots, bleeding abnormalities or any other acute concerns. Procedure/Surgery: Posterior capsule release and achilles lengthening and release of FDL    Date of Procedure/Surgery: 2023    Surgeon: Dr. Abigail Lopez: Radha Galloway     Primary Physician: Tova Beth MD    Latex Allergy: No    Recent use of: No recent use of aspirin (ASA), NSAIDS or steroids but is on Xarelto daily     Anesthesia Complications: None    History of abnormal bleeding : None    History of Blood Transfusions: Yes, in 2019 and earlier this year    Functional Capacity: Is able to climb a flight of stairs without chest pain or severe SOB      Allergies- reviewed: Allergies   Allergen Reactions    Sulfa Antibiotics Hives         Medications- reviewed:   Current Outpatient Medications   Medication Sig    Multiple Vitamin (MULTIVITAMIN PO) Take 1 tablet by mouth daily    Cholecalciferol (VITAMIN D3) 50 MCG (2000) CAPS Take 1 capsule by mouth daily    pantoprazole (PROTONIX) 40 MG tablet Take 1 tablet by mouth 2 times daily    sucralfate (CARAFATE) 1 GM tablet Take 1 tablet by mouth 4 times daily    rivaroxaban (XARELTO) 20 MG TABS tablet Take 1 tablet by mouth daily     No current facility-administered medications for this visit.          Past Medical History- reviewed:  Past Medical History:   Diagnosis

## 2023-05-16 NOTE — PROGRESS NOTES
Identified pt with two pt identifiers(name and ). Reviewed record in preparation for visit and have obtained necessary documentation. Chief Complaint   Patient presents with    Pre-op Exam        Health Maintenance Due   Topic    Varicella vaccine (1 of 2 - 2-dose childhood series)    HIV screen     Hepatitis C screen     DTaP/Tdap/Td vaccine (1 - Tdap)    Shingles vaccine (1 of 2)    Cervical cancer screen     COVID-19 Vaccine (4 - Booster for Moderna series)       Coordination of Care Questionnaire:  :   1) Have you been to an emergency room, urgent care, or hospitalized since your last visit? If yes, where when, and reason for visit? no      2. Have seen or consulted any other health care provider since your last visit? If yes, where when, and reason for visit?  no        Patient is accompanied by self I have received verbal consent from Odilon Julien to discuss any/all medical information while they are present in the room.

## 2023-06-22 ENCOUNTER — CLINICAL DOCUMENTATION (OUTPATIENT)
Age: 38
End: 2023-06-22

## 2023-06-22 NOTE — PROGRESS NOTES
Patient is waiting on [facility] for [facility] for New Patient appt. She will call back to reschedule appt for next week. Ok to use 2 concurrent Research slots or Follow up slots for Mariusz Holt the week of June 26. If none available, schedule patient in open slot with April on Friday, 6/30, per DAILY PRASAD.

## 2023-06-26 ENCOUNTER — OFFICE VISIT (OUTPATIENT)
Facility: CLINIC | Age: 38
End: 2023-06-26
Payer: OTHER GOVERNMENT

## 2023-06-26 VITALS
HEIGHT: 63 IN | SYSTOLIC BLOOD PRESSURE: 138 MMHG | RESPIRATION RATE: 16 BRPM | WEIGHT: 165 LBS | BODY MASS INDEX: 29.23 KG/M2 | DIASTOLIC BLOOD PRESSURE: 87 MMHG | TEMPERATURE: 97.3 F | OXYGEN SATURATION: 99 % | HEART RATE: 81 BPM

## 2023-06-26 DIAGNOSIS — G89.18 POSTOPERATIVE PAIN: ICD-10-CM

## 2023-06-26 DIAGNOSIS — Z86.718 HISTORY OF BLOOD CLOTS: ICD-10-CM

## 2023-06-26 DIAGNOSIS — E87.1 HYPONATREMIA: ICD-10-CM

## 2023-06-26 DIAGNOSIS — G62.9 NEUROPATHY: ICD-10-CM

## 2023-06-26 DIAGNOSIS — Z98.890 HISTORY OF FOOT SURGERY: Primary | ICD-10-CM

## 2023-06-26 DIAGNOSIS — R74.8 ELEVATED LIVER ENZYMES: ICD-10-CM

## 2023-06-26 LAB
ALBUMIN SERPL-MCNC: 4.1 G/DL (ref 3.5–5)
ALBUMIN/GLOB SERPL: 1.2 (ref 1.1–2.2)
ALP SERPL-CCNC: 121 U/L (ref 45–117)
ALT SERPL-CCNC: 16 U/L (ref 12–78)
ANION GAP SERPL CALC-SCNC: 5 MMOL/L (ref 5–15)
AST SERPL-CCNC: 13 U/L (ref 15–37)
BILIRUB DIRECT SERPL-MCNC: 0.1 MG/DL (ref 0–0.2)
BILIRUB SERPL-MCNC: 0.3 MG/DL (ref 0.2–1)
BUN SERPL-MCNC: 9 MG/DL (ref 6–20)
BUN/CREAT SERPL: 15 (ref 12–20)
CALCIUM SERPL-MCNC: 9.9 MG/DL (ref 8.5–10.1)
CHLORIDE SERPL-SCNC: 99 MMOL/L (ref 97–108)
CO2 SERPL-SCNC: 25 MMOL/L (ref 21–32)
CREAT SERPL-MCNC: 0.62 MG/DL (ref 0.55–1.02)
GLOBULIN SER CALC-MCNC: 3.5 G/DL (ref 2–4)
GLUCOSE SERPL-MCNC: 94 MG/DL (ref 65–100)
POTASSIUM SERPL-SCNC: 4.7 MMOL/L (ref 3.5–5.1)
PROT SERPL-MCNC: 7.6 G/DL (ref 6.4–8.2)
SODIUM SERPL-SCNC: 129 MMOL/L (ref 136–145)

## 2023-06-26 PROCEDURE — 3075F SYST BP GE 130 - 139MM HG: CPT | Performed by: STUDENT IN AN ORGANIZED HEALTH CARE EDUCATION/TRAINING PROGRAM

## 2023-06-26 PROCEDURE — 3079F DIAST BP 80-89 MM HG: CPT | Performed by: STUDENT IN AN ORGANIZED HEALTH CARE EDUCATION/TRAINING PROGRAM

## 2023-06-26 PROCEDURE — 99214 OFFICE O/P EST MOD 30 MIN: CPT | Performed by: STUDENT IN AN ORGANIZED HEALTH CARE EDUCATION/TRAINING PROGRAM

## 2023-06-26 RX ORDER — TRAMADOL HYDROCHLORIDE 50 MG/1
50 TABLET ORAL EVERY 8 HOURS PRN
Qty: 90 TABLET | Refills: 0 | Status: SHIPPED | OUTPATIENT
Start: 2023-06-26 | End: 2023-07-26

## 2023-06-26 RX ORDER — GABAPENTIN 300 MG/1
CAPSULE ORAL
COMMUNITY
Start: 2023-06-06

## 2023-06-26 ASSESSMENT — ENCOUNTER SYMPTOMS
NAUSEA: 0
RHINORRHEA: 0
SHORTNESS OF BREATH: 0
COUGH: 0
ABDOMINAL PAIN: 0
VOMITING: 0

## 2023-06-30 ENCOUNTER — TELEPHONE (OUTPATIENT)
Facility: CLINIC | Age: 38
End: 2023-06-30

## 2023-06-30 NOTE — TELEPHONE ENCOUNTER
----- Message from Savita Coronado MD sent at 6/30/2023  1:18 PM EDT -----  Please call patient and notify her that her sodium is low. Therefore I would like to repeat this lab next week to ensure that it is improving. I have already placed the lab order in and she can walk-in to have this done at our lab.

## 2023-07-26 ENCOUNTER — OFFICE VISIT (OUTPATIENT)
Facility: CLINIC | Age: 38
End: 2023-07-26
Payer: OTHER GOVERNMENT

## 2023-07-26 VITALS
HEIGHT: 63 IN | OXYGEN SATURATION: 99 % | TEMPERATURE: 97.4 F | DIASTOLIC BLOOD PRESSURE: 86 MMHG | RESPIRATION RATE: 16 BRPM | WEIGHT: 165 LBS | SYSTOLIC BLOOD PRESSURE: 122 MMHG | BODY MASS INDEX: 29.23 KG/M2 | HEART RATE: 97 BPM

## 2023-07-26 DIAGNOSIS — Z98.890 HISTORY OF FOOT SURGERY: ICD-10-CM

## 2023-07-26 DIAGNOSIS — E87.1 HYPONATREMIA: ICD-10-CM

## 2023-07-26 DIAGNOSIS — G89.18 POSTOPERATIVE PAIN: Primary | ICD-10-CM

## 2023-07-26 PROCEDURE — 3079F DIAST BP 80-89 MM HG: CPT | Performed by: STUDENT IN AN ORGANIZED HEALTH CARE EDUCATION/TRAINING PROGRAM

## 2023-07-26 PROCEDURE — 3074F SYST BP LT 130 MM HG: CPT | Performed by: STUDENT IN AN ORGANIZED HEALTH CARE EDUCATION/TRAINING PROGRAM

## 2023-07-26 PROCEDURE — 99213 OFFICE O/P EST LOW 20 MIN: CPT | Performed by: STUDENT IN AN ORGANIZED HEALTH CARE EDUCATION/TRAINING PROGRAM

## 2023-07-26 RX ORDER — PREGABALIN 75 MG/1
75 CAPSULE ORAL 2 TIMES DAILY
COMMUNITY
Start: 2023-07-18

## 2023-07-26 ASSESSMENT — ENCOUNTER SYMPTOMS
SHORTNESS OF BREATH: 0
COUGH: 0
VOMITING: 0
ABDOMINAL PAIN: 0
NAUSEA: 0
RHINORRHEA: 0

## 2023-07-26 NOTE — PROGRESS NOTES
soft.      Tenderness: There is no abdominal tenderness. There is no guarding or rebound. Musculoskeletal:      Cervical back: Neck supple. Right lower leg: No edema. Left lower leg: No edema. Neurological:      Mental Status: She is alert and oriented to person, place, and time. Gait: Gait abnormal (left walking boot in place). No results found for this visit on 07/26/23.

## 2023-07-27 LAB
ANION GAP SERPL CALC-SCNC: 4 MMOL/L (ref 5–15)
BUN SERPL-MCNC: 12 MG/DL (ref 6–20)
BUN/CREAT SERPL: 16 (ref 12–20)
CALCIUM SERPL-MCNC: 9.8 MG/DL (ref 8.5–10.1)
CHLORIDE SERPL-SCNC: 107 MMOL/L (ref 97–108)
CO2 SERPL-SCNC: 24 MMOL/L (ref 21–32)
CREAT SERPL-MCNC: 0.73 MG/DL (ref 0.55–1.02)
GLUCOSE SERPL-MCNC: 100 MG/DL (ref 65–100)
POTASSIUM SERPL-SCNC: 4.2 MMOL/L (ref 3.5–5.1)
SODIUM SERPL-SCNC: 135 MMOL/L (ref 136–145)

## 2023-10-03 ENCOUNTER — OFFICE VISIT (OUTPATIENT)
Facility: CLINIC | Age: 38
End: 2023-10-03
Payer: OTHER GOVERNMENT

## 2023-10-03 VITALS
TEMPERATURE: 97.7 F | SYSTOLIC BLOOD PRESSURE: 120 MMHG | DIASTOLIC BLOOD PRESSURE: 78 MMHG | WEIGHT: 177 LBS | BODY MASS INDEX: 31.36 KG/M2 | RESPIRATION RATE: 20 BRPM | HEIGHT: 63 IN | OXYGEN SATURATION: 100 % | HEART RATE: 70 BPM

## 2023-10-03 DIAGNOSIS — Z01.818 PRE-OP EVALUATION: Primary | ICD-10-CM

## 2023-10-03 PROCEDURE — 3078F DIAST BP <80 MM HG: CPT | Performed by: NURSE PRACTITIONER

## 2023-10-03 PROCEDURE — 99213 OFFICE O/P EST LOW 20 MIN: CPT | Performed by: NURSE PRACTITIONER

## 2023-10-03 PROCEDURE — 3074F SYST BP LT 130 MM HG: CPT | Performed by: NURSE PRACTITIONER

## 2023-10-03 RX ORDER — HYDROCODONE BITARTRATE AND ACETAMINOPHEN 7.5; 325 MG/1; MG/1
TABLET ORAL
COMMUNITY
Start: 2023-09-15

## 2023-10-03 ASSESSMENT — ENCOUNTER SYMPTOMS
GASTROINTESTINAL NEGATIVE: 1
EYES NEGATIVE: 1
ALLERGIC/IMMUNOLOGIC NEGATIVE: 1
RESPIRATORY NEGATIVE: 1

## 2023-10-03 NOTE — PATIENT INSTRUCTIONS
Will need to have forms sent from Pain management in regards to pre op procedure detail and forms.   May need to be seen , Virtual is OK to complete forms

## 2023-12-28 ENCOUNTER — TELEPHONE (OUTPATIENT)
Facility: CLINIC | Age: 38
End: 2023-12-28

## 2023-12-28 NOTE — TELEPHONE ENCOUNTER
----- Message from Jc El sent at 12/28/2023 10:52 AM EST -----  Subject: Appointment Request    Reason for Call: New Patient/New to Provider Appointment needed: New   Patient Request Appointment    QUESTIONS    Reason for appointment request? Requested Provider unavailable - Jose Johnson     Additional Information for Provider?  Patient requesting to be a new   patient.  ---------------------------------------------------------------------------  --------------  Rafael RAMOS  [phone]; OK to leave message on voicemail,OK to respond with electronic   message via [facility] portal (only for patients who have registered [facility]   account)  ---------------------------------------------------------------------------  --------------  SCRIPT ANSWERS

## 2023-12-29 ENCOUNTER — APPOINTMENT (OUTPATIENT)
Facility: HOSPITAL | Age: 38
End: 2023-12-29
Payer: OTHER GOVERNMENT

## 2023-12-29 ENCOUNTER — HOSPITAL ENCOUNTER (EMERGENCY)
Facility: HOSPITAL | Age: 38
Discharge: HOME | End: 2023-12-29
Attending: EMERGENCY MEDICINE
Payer: OTHER GOVERNMENT

## 2023-12-29 VITALS
TEMPERATURE: 98.7 F | DIASTOLIC BLOOD PRESSURE: 83 MMHG | WEIGHT: 165 LBS | HEART RATE: 79 BPM | BODY MASS INDEX: 29.23 KG/M2 | OXYGEN SATURATION: 100 % | RESPIRATION RATE: 18 BRPM | HEIGHT: 63 IN | SYSTOLIC BLOOD PRESSURE: 126 MMHG

## 2023-12-29 DIAGNOSIS — M25.562 ACUTE PAIN OF LEFT KNEE: ICD-10-CM
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
## 2023-12-29 DIAGNOSIS — M25.572 ACUTE LEFT ANKLE PAIN: ICD-10-CM

## 2023-12-29 PROCEDURE — 73562 X-RAY EXAM OF KNEE 3: CPT

## 2023-12-29 PROCEDURE — 73590 X-RAY EXAM OF LOWER LEG: CPT

## 2023-12-29 PROCEDURE — 73610 X-RAY EXAM OF ANKLE: CPT

## 2023-12-29 PROCEDURE — 99283 EMERGENCY DEPT VISIT LOW MDM: CPT

## 2023-12-29 PROCEDURE — 6370000000 HC RX 637 (ALT 250 FOR IP): Performed by: NURSE PRACTITIONER

## 2023-12-29 PROCEDURE — 73630 X-RAY EXAM OF FOOT: CPT

## 2023-12-29 RX ORDER — OXYCODONE HYDROCHLORIDE 5 MG/1
5 TABLET ORAL EVERY 6 HOURS PRN
Qty: 12 TABLET | Refills: 0 | Status: SHIPPED | OUTPATIENT
Start: 2023-12-29 | End: 2024-01-01

## 2023-12-29 RX ORDER — CYCLOBENZAPRINE HCL 10 MG
10 TABLET ORAL ONCE
Status: COMPLETED | OUTPATIENT
Start: 2023-12-29 | End: 2023-12-29

## 2023-12-29 RX ORDER — LIDOCAINE 4 G/G
1 PATCH TOPICAL ONCE
Status: DISCONTINUED | OUTPATIENT
Start: 2023-12-29 | End: 2023-12-29 | Stop reason: HOSPADM

## 2023-12-29 RX ORDER — OXYCODONE HYDROCHLORIDE 5 MG/1
5 TABLET ORAL
Status: COMPLETED | OUTPATIENT
Start: 2023-12-29 | End: 2023-12-29

## 2023-12-29 RX ORDER — CYCLOBENZAPRINE HCL 10 MG
10 TABLET ORAL 3 TIMES DAILY PRN
Qty: 21 TABLET | Refills: 0 | Status: SHIPPED | OUTPATIENT
Start: 2023-12-29 | End: 2024-01-08

## 2023-12-29 RX ADMIN — OXYCODONE HYDROCHLORIDE 5 MG: 5 TABLET ORAL at 17:27

## 2023-12-29 RX ADMIN — CYCLOBENZAPRINE 10 MG: 10 TABLET, FILM COATED ORAL at 17:27

## 2023-12-29 ASSESSMENT — PAIN DESCRIPTION - LOCATION
LOCATION: KNEE
LOCATION: LEG

## 2023-12-29 ASSESSMENT — PAIN DESCRIPTION - DESCRIPTORS
DESCRIPTORS: ACHING
DESCRIPTORS: ACHING

## 2023-12-29 ASSESSMENT — PAIN - FUNCTIONAL ASSESSMENT: PAIN_FUNCTIONAL_ASSESSMENT: 0-10

## 2023-12-29 ASSESSMENT — PAIN DESCRIPTION - ORIENTATION
ORIENTATION: LEFT;LOWER
ORIENTATION: LEFT

## 2023-12-29 ASSESSMENT — PAIN SCALES - GENERAL
PAINLEVEL_OUTOF10: 7
PAINLEVEL_OUTOF10: 4

## 2023-12-29 NOTE — ED PROVIDER NOTES
[facility] EMERGENCY DEPT  EMERGENCY DEPARTMENT ENCOUNTER      Pt Name: Rupa Mancera  MRN: [id number]  Birthdate 1985  Date of evaluation: 2023  Provider: KEYON Santos   Patient is a 38 y.o. female with pmhx of neuropathy, iron overload, hypertension, GERD, DVT (currently on Xarelto) who presents today with complaints of fall. Works as a - statesfoot got caught in a napkin. Slipped/ left leg gave out.  Reporting left knee/ankle pain-with majority of pain along the Achilles/back of foot/heel.  Denies striking head or losing consciousness.  Took tylenol and hydocodone with no improvement of pain.  Hydrocodone a chronic medication managed by pain management. Followed with Dr Nik Joyce with [facility]- reports recent surgery to Achilles tendon for foot drop.    There are no other complaints, changes or physical findings at this time.      PAST MEDICAL HISTORY     Past Medical History:   Diagnosis Date    Iron overload 2023    Neuropathy     Reportedly admitted in 2019 in [place]; felt to be related to prolonged hopsitalization.    Pancreatitis 10/20/2022    ? of Alcohol per patient    Sepsis (HCC) 2023         SURGICAL HISTORY       Past Surgical History:   Procedure Laterality Date    ACHILLES TENDON SURGERY Left 2023    APPENDECTOMY       SECTION      SPLENECTOMY      reportedly after a splenic rupture         CURRENT MEDICATIONS       Discharge Medication List as of 2023  7:08 PM        CONTINUE these medications which have NOT CHANGED    Details   HYDROcodone-acetaminophen (NORCO) 7.5-325 MG per tablet TAKE 1 TABLET BY MOUTH UPTO 4 TIMES DAILY FOR BREAKTHROUGH PAINHistorical Med      pregabalin (LYRICA) 75 MG capsule Take 1 capsule by mouth 2 times daily.Historical Med      rivaroxaban (XARELTO) 20 MG TABS tablet Take 1 tablet by mouth daily, Disp-90 tablet, R-1Normal      Multiple Vitamin (MULTIVITAMIN PO) Take 1 tablet by mouth dailyHistorical Med     Muscle spasms, Disp-21 tablet, R-0Normal      oxyCODONE (ROXICODONE) 5 MG immediate release tablet Take 1 tablet by mouth every 6 hours as needed for Pain for up to 3 days. Intended supply: 3 days. Take lowest dose possible to manage pain Max Daily Amount: 20 mg, Disp-12 tablet, R-0Normal           Return to the ED if worse      Please note that this dictation was completed with [employer] the computer voice recognition software. Quite often unanticipated grammatical, syntax, homophones, and other interpretive errors are inadvertently transcribed by the computer software. Please disregard these errors. Please excuse any errors that have escaped final proofreading.     KEYON Fischer - NP (electronically signed)        KEYON Fischer NP  12/29/23 1155

## 2023-12-29 NOTE — ED TRIAGE NOTES
Patient arrives with c/o left knee and left foot pain. States that she was at work today, and slipped on napkins that fell from a cart. States that her leg gave out. Reports hx of neuropathy, and hx of achilles repair due to hx of foot drop. Reports taking tylenol and hydrocodone after fall. Savanna Esposito NP in triage assessing patient. Yes

## 2023-12-30 NOTE — ED NOTES
Discharged by provider. Patient reports she is waiting for her  to pick her up and drive her home. Denies questions or concerns.

## 2023-12-30 NOTE — ED NOTES
Discharged by provider. Patient demonstrates use of crutches. Denies questions or concerns. Splint reviewed by provider.

## 2023-12-30 NOTE — DISCHARGE INSTRUCTIONS
[facility]- Same day appointment call: [phone]          Thank you for allowing us to provide you with medical care today. We realize that you have many choices for your emergency care needs. We thank you for choosing [facility]. Please choose us in the future for any continued health care needs. We hope we addressed all of your medical concerns. We strive to provide excellent quality care in the Emergency Department. Anything less than excellent does not meet our expectations. The exam and treatment you received in the Emergency Department were for an emergent problem and are not intended as complete care. It is important that you follow up with a doctor, nurse practitioner, or [address] [place] [address] assistant for ongoing care. If your symptoms worsen or you do not improve as expected and you are unable to reach your usual health care provider, you should return to the Emergency Department. We are available 24 hours a day. Take this sheet with you when you go to your follow-up visit. If you have any problem arranging the follow-up visit, contact the Emergency Department immediately. Make an appointment your family doctor for follow up of this visit. Return to the ER if you are unable to be seen in a timely manner.

## 2024-04-02 ENCOUNTER — OFFICE VISIT (OUTPATIENT)
Facility: CLINIC | Age: 39
End: 2024-04-02
Payer: OTHER GOVERNMENT

## 2024-04-02 ENCOUNTER — TELEPHONE (OUTPATIENT)
Facility: CLINIC | Age: 39
End: 2024-04-02

## 2024-04-02 ENCOUNTER — TELEPHONE (OUTPATIENT)
Age: 39
End: 2024-04-02

## 2024-04-02 VITALS
HEART RATE: 85 BPM | OXYGEN SATURATION: 100 % | SYSTOLIC BLOOD PRESSURE: 116 MMHG | DIASTOLIC BLOOD PRESSURE: 75 MMHG | TEMPERATURE: 98.5 F | RESPIRATION RATE: 12 BRPM | BODY MASS INDEX: 31.36 KG/M2 | WEIGHT: 177 LBS | HEIGHT: 63 IN

## 2024-04-02 DIAGNOSIS — D50.9 IRON DEFICIENCY ANEMIA, UNSPECIFIED IRON DEFICIENCY ANEMIA TYPE: ICD-10-CM

## 2024-04-02 DIAGNOSIS — G62.9 NEUROPATHY: ICD-10-CM

## 2024-04-02 DIAGNOSIS — Z86.718 HISTORY OF BLOOD CLOTS: Primary | ICD-10-CM

## 2024-04-02 DIAGNOSIS — Z3A.01 LESS THAN 8 WEEKS GESTATION OF PREGNANCY: ICD-10-CM

## 2024-04-02 DIAGNOSIS — E55.9 VITAMIN D DEFICIENCY: ICD-10-CM

## 2024-04-02 LAB
ALBUMIN SERPL-MCNC: 2.9 G/DL (ref 3.5–5)
ALBUMIN/GLOB SERPL: 0.7 (ref 1.1–2.2)
ALP SERPL-CCNC: 91 U/L (ref 45–117)
ALT SERPL-CCNC: 13 U/L (ref 12–78)
ANION GAP SERPL CALC-SCNC: 6 MMOL/L (ref 5–15)
AST SERPL-CCNC: 13 U/L (ref 15–37)
BASOPHILS # BLD: 0.1 K/UL (ref 0–0.1)
BASOPHILS NFR BLD: 1 % (ref 0–1)
BILIRUB SERPL-MCNC: 0.4 MG/DL (ref 0.2–1)
BUN SERPL-MCNC: 8 MG/DL (ref 6–20)
BUN/CREAT SERPL: 15 (ref 12–20)
CALCIUM SERPL-MCNC: 9.2 MG/DL (ref 8.5–10.1)
CHLORIDE SERPL-SCNC: 106 MMOL/L (ref 97–108)
CHOLEST SERPL-MCNC: 178 MG/DL
CO2 SERPL-SCNC: 22 MMOL/L (ref 21–32)
CREAT SERPL-MCNC: 0.52 MG/DL (ref 0.55–1.02)
DIFFERENTIAL METHOD BLD: ABNORMAL
EOSINOPHIL # BLD: 0 K/UL (ref 0–0.4)
EOSINOPHIL NFR BLD: 0 % (ref 0–7)
ERYTHROCYTE [DISTWIDTH] IN BLOOD BY AUTOMATED COUNT: 22 % (ref 11.5–14.5)
EST. AVERAGE GLUCOSE BLD GHB EST-MCNC: 114 MG/DL
FERRITIN SERPL-MCNC: 4 NG/ML (ref 8–252)
GLOBULIN SER CALC-MCNC: 4.1 G/DL (ref 2–4)
GLUCOSE SERPL-MCNC: 102 MG/DL (ref 65–100)
HBA1C MFR BLD: 5.6 % (ref 4–5.6)
HCG SERPL-ACNC: ABNORMAL MIU/ML (ref 0–6)
HCG, PREGNANCY, URINE, POC: POSITIVE
HCT VFR BLD AUTO: 29.2 % (ref 35–47)
HDLC SERPL-MCNC: 55 MG/DL
HDLC SERPL: 3.2 (ref 0–5)
HGB BLD-MCNC: 8.1 G/DL (ref 11.5–16)
IMM GRANULOCYTES # BLD AUTO: 0.1 K/UL (ref 0–0.04)
IMM GRANULOCYTES NFR BLD AUTO: 1 % (ref 0–0.5)
IRON SATN MFR SERPL: 3 % (ref 20–50)
IRON SERPL-MCNC: 14 UG/DL (ref 35–150)
LDLC SERPL CALC-MCNC: 101 MG/DL (ref 0–100)
LYMPHOCYTES # BLD: 1.8 K/UL (ref 0.8–3.5)
LYMPHOCYTES NFR BLD: 17 % (ref 12–49)
MCH RBC QN AUTO: 18 PG (ref 26–34)
MCHC RBC AUTO-ENTMCNC: 27.7 G/DL (ref 30–36.5)
MCV RBC AUTO: 64.9 FL (ref 80–99)
MONOCYTES # BLD: 0.9 K/UL (ref 0–1)
MONOCYTES NFR BLD: 9 % (ref 5–13)
NEUTS SEG # BLD: 7.6 K/UL (ref 1.8–8)
NEUTS SEG NFR BLD: 72 % (ref 32–75)
NRBC # BLD: 0 K/UL (ref 0–0.01)
NRBC BLD-RTO: 0 PER 100 WBC
PLATELET # BLD AUTO: 624 K/UL (ref 150–400)
PMV BLD AUTO: 9.4 FL (ref 8.9–12.9)
POTASSIUM SERPL-SCNC: 4.1 MMOL/L (ref 3.5–5.1)
PROT SERPL-MCNC: 7 G/DL (ref 6.4–8.2)
RBC # BLD AUTO: 4.5 M/UL (ref 3.8–5.2)
RBC MORPH BLD: ABNORMAL
SODIUM SERPL-SCNC: 134 MMOL/L (ref 136–145)
T4 FREE SERPL-MCNC: 1.1 NG/DL (ref 0.8–1.5)
TIBC SERPL-MCNC: 528 UG/DL (ref 250–450)
TRIGL SERPL-MCNC: 110 MG/DL
TSH SERPL DL<=0.05 MIU/L-ACNC: 0.92 UIU/ML (ref 0.36–3.74)
VALID INTERNAL CONTROL, POC: YES
VLDLC SERPL CALC-MCNC: 22 MG/DL
WBC # BLD AUTO: 10.5 K/UL (ref 3.6–11)

## 2024-04-02 PROCEDURE — 81025 URINE PREGNANCY TEST: CPT | Performed by: PHYSICIAN ASSISTANT

## 2024-04-02 PROCEDURE — 3074F SYST BP LT 130 MM HG: CPT | Performed by: PHYSICIAN ASSISTANT

## 2024-04-02 PROCEDURE — 3078F DIAST BP <80 MM HG: CPT | Performed by: PHYSICIAN ASSISTANT

## 2024-04-02 PROCEDURE — 99214 OFFICE O/P EST MOD 30 MIN: CPT | Performed by: PHYSICIAN ASSISTANT

## 2024-04-02 RX ORDER — ENOXAPARIN SODIUM 100 MG/ML
1 INJECTION SUBCUTANEOUS DAILY
Qty: 15 ML | Refills: 1 | Status: SHIPPED | OUTPATIENT
Start: 2024-04-02

## 2024-04-02 ASSESSMENT — PATIENT HEALTH QUESTIONNAIRE - PHQ9
2. FEELING DOWN, DEPRESSED OR HOPELESS: NOT AT ALL
SUM OF ALL RESPONSES TO PHQ QUESTIONS 1-9: 0
SUM OF ALL RESPONSES TO PHQ9 QUESTIONS 1 & 2: 0
SUM OF ALL RESPONSES TO PHQ QUESTIONS 1-9: 0
1. LITTLE INTEREST OR PLEASURE IN DOING THINGS: NOT AT ALL

## 2024-04-02 ASSESSMENT — ENCOUNTER SYMPTOMS
VOMITING: 0
COUGH: 0
DIARRHEA: 0
SINUS PAIN: 0
SORE THROAT: 0
RHINORRHEA: 0
NAUSEA: 0

## 2024-04-02 NOTE — TELEPHONE ENCOUNTER
I have called  [facility]  and talk to Eloisa    Per Eloisa, she will forward message to NP to advise, patient is pregnant  and pain medication will need to be changed.    Office note fax to [phone]      Ann-Marie De Dios LPN

## 2024-04-02 NOTE — PROGRESS NOTES
History of present illness:Rupa Mancera is a 38 y.o. female presenting for Consultation (Obgyn referral ) and Blood Work (Patient had coffee)    Ms Mancera is here for labs and med rf.  She is nonfasting.  Patient mentions that she is about less than 8 weeks pregnant, patient checked home urine test in the beginning of March, which came out positive. She was trying to see someone for OB/GYN referral.  She is not sure about her LMP, it might be about 6 to 7 weeks ago per patient.  Patient is concerned about her taking current medications and her pregnancy.  She request OB/GYN referral for her further pregnancy related care.  She is currently taking all her medications.  Denies any vaginal bleeding or other pregnancy related issues at this point.    History of chronic neuropathy.  Patient takes Norco and Lyrica for her neuropathy issues, managed by pain management.    History of bilateral arms blood clot in the past.  Patient takes Xarelto for blood clot issues.    History of iron deficiency anemia.  She used to see hematology Dr. Cardenas for anemia in 2023.    History of vitamin D deficiency.  Patient takes over-the-counter vitamin D and multivitamins.    Review of Systems   Constitutional:  Negative for chills, fatigue and fever.   HENT:  Negative for congestion, ear pain, rhinorrhea, sinus pain and sore throat.    Eyes:  Negative for visual disturbance.   Respiratory:  Negative for cough and shortness of breath.    Cardiovascular:  Negative for chest pain and palpitations.   Gastrointestinal:  Negative for diarrhea, nausea and vomiting.   Genitourinary:  Negative for dysuria, flank pain, frequency and urgency.   Musculoskeletal:  Negative for arthralgias and joint swelling.   Skin:  Negative for rash.   Neurological:  Negative for dizziness, weakness and headaches.   Psychiatric/Behavioral:  Negative for behavioral problems and confusion.      Objective  Physical Exam  Vitals reviewed.   Constitutional:       General:

## 2024-04-02 NOTE — TELEPHONE ENCOUNTER
Ann-Marie from [facility] called and stated that the patient PCP would like to get this patient in to see  this week if possible. She stated that they just found out that the patient is pregnant and want her to be seen soon. Please Advise.       # [phone]

## 2024-04-02 NOTE — PROGRESS NOTES
Rupa Mancera is a 38 y.o. female presenting for Consultation (Obgyn referral ) and Blood Work (Patient had coffee)      /75 (Site: Left Upper Arm, Position: Sitting, Cuff Size: Medium Adult)   Pulse 85   Temp 98.5 °F (36.9 °C) (Oral)   Resp 12   Ht 1.6 m (5' 3\")   Wt 80.3 kg (177 lb)   SpO2 100%   BMI 31.35 kg/m²       Current Outpatient Medications   Medication Sig Dispense Refill    HYDROcodone-acetaminophen (NORCO) 7.5-325 MG per tablet TAKE 1 TABLET BY MOUTH UPTO 4 TIMES DAILY FOR BREAKTHROUGH PAIN      pregabalin (LYRICA) 75 MG capsule Take 1 capsule by mouth 2 times daily.      rivaroxaban (XARELTO) 20 MG TABS tablet Take 1 tablet by mouth daily 90 tablet 1    Multiple Vitamin (MULTIVITAMIN PO) Take 1 tablet by mouth daily      Cholecalciferol (VITAMIN D3) 50 MCG (2000 UT) CAPS Take 1 capsule by mouth daily (Patient not taking: Reported on 4/2/2024)       No current facility-administered medications for this visit.        1. \"Have you been to the ER, urgent care clinic since your last visit?  Hospitalized since your last visit?\"     12/29/2023 (40 minutes)  [facility] EMERGENCY DEPT fall injuring left foot     2. \"Have you seen or consulted any other health care providers outside of the [facility] since your last visit?\" Yes Where: [facility] surgeon       3. For patients aged 45-75: Has the patient had a colonoscopy / FIT/ Cologuard? NA - based on age      If the patient is female:    4. For patients aged 40-74: Has the patient had a mammogram within the past 2 years? NA - based on age or sex      5. For patients aged 21-65: Has the patient had a pap smear? No

## 2024-04-02 NOTE — TELEPHONE ENCOUNTER
I have called Dr Doan office to set up appt for Mrs. Mancera    Per Stephanie, she will forward the message to Dr. Doan team and they will call to get the patient in to be seen this week.      Ann-Marie De Dios LPN

## 2024-04-03 LAB — 1,25(OH)2D3 SERPL-MCNC: 69.1 PG/ML (ref 24.8–81.5)

## 2024-04-03 NOTE — RESULT ENCOUNTER NOTE
Recent lab results showed pregnancy around 4-5 wks per hCG levels. Keep ob/gyn appt as scheduled.     CBC and iron panel results showed moderate-severe iron deficiency anemia findings, keep hematology Dr Cardenas appt on 04/05/24.    Rests of lab results showed mild changes.  Vit D level is pending.     Keep f/u with specialists as scheduled.     Please contact clinic if any concerns or questions.

## 2024-04-03 NOTE — RESULT ENCOUNTER NOTE
Called pt at 3.40 pm.  She was contacted by pain management, stopped her Norco and Lyrica. No pain meds currently per pt.   Anemia, thrombocytosis; Hematology appt in 2 days.   Sees ob/gyn soon per pt.  No concerns at this time.

## 2024-04-05 ENCOUNTER — OFFICE VISIT (OUTPATIENT)
Age: 39
End: 2024-04-05
Payer: OTHER GOVERNMENT

## 2024-04-05 VITALS
HEART RATE: 81 BPM | DIASTOLIC BLOOD PRESSURE: 74 MMHG | OXYGEN SATURATION: 98 % | RESPIRATION RATE: 18 BRPM | SYSTOLIC BLOOD PRESSURE: 122 MMHG | HEIGHT: 63 IN | TEMPERATURE: 98.1 F | WEIGHT: 182 LBS | BODY MASS INDEX: 32.25 KG/M2

## 2024-04-05 DIAGNOSIS — Z32.01 PREGNANCY TEST-POSITIVE: ICD-10-CM

## 2024-04-05 DIAGNOSIS — D50.8 IRON DEFICIENCY ANEMIA SECONDARY TO INADEQUATE DIETARY IRON INTAKE: Primary | ICD-10-CM

## 2024-04-05 DIAGNOSIS — D50.8 IRON DEFICIENCY ANEMIA SECONDARY TO INADEQUATE DIETARY IRON INTAKE: ICD-10-CM

## 2024-04-05 DIAGNOSIS — Z79.899 HIGH RISK MEDICATION USE: ICD-10-CM

## 2024-04-05 PROCEDURE — 99214 OFFICE O/P EST MOD 30 MIN: CPT | Performed by: INTERNAL MEDICINE

## 2024-04-05 PROCEDURE — 3074F SYST BP LT 130 MM HG: CPT | Performed by: INTERNAL MEDICINE

## 2024-04-05 PROCEDURE — 3078F DIAST BP <80 MM HG: CPT | Performed by: INTERNAL MEDICINE

## 2024-04-05 ASSESSMENT — PATIENT HEALTH QUESTIONNAIRE - PHQ9
SUM OF ALL RESPONSES TO PHQ QUESTIONS 1-9: 0
2. FEELING DOWN, DEPRESSED OR HOPELESS: NOT AT ALL
SUM OF ALL RESPONSES TO PHQ QUESTIONS 1-9: 0
SUM OF ALL RESPONSES TO PHQ QUESTIONS 1-9: 0
SUM OF ALL RESPONSES TO PHQ9 QUESTIONS 1 & 2: 0
SUM OF ALL RESPONSES TO PHQ QUESTIONS 1-9: 0
1. LITTLE INTEREST OR PLEASURE IN DOING THINGS: NOT AT ALL

## 2024-04-05 NOTE — PROGRESS NOTES
Chief Complaint   Patient presents with    Follow-up           Vitals:    04/05/24 1434   BP: 122/74   Pulse: 81   Resp: 18   Temp: 98.1 °F (36.7 °C)   SpO2: 98%            1. Have you been to the ER, urgent care clinic since your last visit?  Hospitalized since your last visit?  Yes April of 2023, [facility], notes viewable in chart  2. Have you seen or consulted any other health care providers outside of the [facility] since your last visit?  Include any pap smears or colon screening. Yes Surgeon, Dr. Joyce

## 2024-04-05 NOTE — PROGRESS NOTES
[facility]  [address], [address] [facility] [address]  W: [phone]  F: [phone] Patient ID  Name: Rupa Mancera  YOB: 1985  MRN: [id number]  Referring Provider:   Pratibha Joyce PA-C  [address] [place] [id number]  Primary Care Provider:   Millie Wise MD       HEMATOLOGY/MEDICAL ONCOLOGY  NOTE     Reason for Evaluation:     Chief Complaint   Patient presents with    Follow-up       Subjective:     History of Present Illness:   Date of Visit: 04/05/24    Rupa Mancera is a 38 y.o. female who presents for a follow-up evaluation for IRON DEFICIENCY ANEMIA. Patient seen last year for anemia prior to surgery. She was given IV Iron Sucrose for 5 treatments.   Reportedly was admitted about one week later and treated for iron overload. She reports that she received medicine for the excess iron.  She denies any current bleeding. Reports that she had a positive pregnancy test and will have more confirmatory testing on Monday to determine the age..     Patient overall reports feeling stable.      Current Outpatient Medications   Medication Instructions    Cholecalciferol (VITAMIN D3) 50 MCG (2000 UT) CAPS 1 capsule, DAILY    enoxaparin (LOVENOX) 1 mg/kg, SubCUTAneous, DAILY    HYDROcodone-acetaminophen (NORCO) 7.5-325 MG per tablet TAKE 1 TABLET BY MOUTH UPTO 4 TIMES DAILY FOR BREAKTHROUGH PAIN    Multiple Vitamin (MULTIVITAMIN PO) 1 tablet, Oral, DAILY    pregabalin (LYRICA) 75 mg, 2 TIMES DAILY     Allergies   Allergen Reactions    Sulfa Antibiotics Hives       Review of Systems Provided by:  Patient  Review of Systems: A complete review of systems was obtained, reviewed.  Pertinent findings reviewed above.  Past medical history, social history, and family history  are located in the electronic medical record.  Objective:     Vitals:    04/05/24 1434   BP: 122/74   Pulse: 81   Resp: 18   Temp: 98.1 °F (36.7 °C)   SpO2: 98%

## 2024-04-06 LAB
BASOPHILS # BLD: 0 K/UL (ref 0–0.1)
BASOPHILS NFR BLD: 0 % (ref 0–1)
DIFFERENTIAL METHOD BLD: ABNORMAL
EOSINOPHIL # BLD: 0 K/UL (ref 0–0.4)
EOSINOPHIL NFR BLD: 0 % (ref 0–7)
ERYTHROCYTE [DISTWIDTH] IN BLOOD BY AUTOMATED COUNT: 21.3 % (ref 11.5–14.5)
FOLATE SERPL-MCNC: 24.6 NG/ML (ref 5–21)
HCT VFR BLD AUTO: 26.1 % (ref 35–47)
HGB BLD-MCNC: 7.2 G/DL (ref 11.5–16)
IMM GRANULOCYTES # BLD AUTO: 0 K/UL
IMM GRANULOCYTES NFR BLD AUTO: 0 %
LYMPHOCYTES # BLD: 3.8 K/UL (ref 0.8–3.5)
LYMPHOCYTES NFR BLD: 35 % (ref 12–49)
MCH RBC QN AUTO: 18.1 PG (ref 26–34)
MCHC RBC AUTO-ENTMCNC: 27.6 G/DL (ref 30–36.5)
MCV RBC AUTO: 65.6 FL (ref 80–99)
MONOCYTES # BLD: 1.3 K/UL (ref 0–1)
MONOCYTES NFR BLD: 12 % (ref 5–13)
NEUTS SEG # BLD: 5.7 K/UL (ref 1.8–8)
NEUTS SEG NFR BLD: 53 % (ref 32–75)
NRBC # BLD: 0 K/UL (ref 0–0.01)
NRBC BLD-RTO: 0 PER 100 WBC
PERIPHERAL SMEAR, MD REVIEW: NORMAL
PLATELET # BLD AUTO: 485 K/UL (ref 150–400)
PMV BLD AUTO: 9.7 FL (ref 8.9–12.9)
RBC # BLD AUTO: 3.98 M/UL (ref 3.8–5.2)
RBC MORPH BLD: ABNORMAL
VIT B12 SERPL-MCNC: 460 PG/ML (ref 193–986)
WBC # BLD AUTO: 10.8 K/UL (ref 3.6–11)

## 2024-04-08 ENCOUNTER — TELEPHONE (OUTPATIENT)
Facility: CLINIC | Age: 39
End: 2024-04-08

## 2024-04-08 NOTE — TELEPHONE ENCOUNTER
[facility]?  Please advise    Patient would like to terminate pregnacy and would like to know if she needs a referral for [facility].

## 2024-04-08 NOTE — TELEPHONE ENCOUNTER
Patient would like to terminate pregnacy and would like to know if she needs a referral for [facility].

## 2024-04-09 NOTE — TELEPHONE ENCOUNTER
Two patient Identification confirmed.   Patient saw GYN on 4/8/2024.  No action needed at this time.

## 2024-04-10 ENCOUNTER — CLINICAL DOCUMENTATION (OUTPATIENT)
Age: 39
End: 2024-04-10

## 2024-04-10 NOTE — PROGRESS NOTES
Clinical Pharmacy Note    Rupa Mancera is a 38YOF with a history of iron deficiency anemia treated by Dr Doan in 2023 with IV iron (Venofer) on 3/8, 3/15, 3/22, 3/29 and 4/5.      Latest Reference Range & Units 02/27/23 12:11 03/02/23 15:39 04/05/23 12:21 04/08/23 21:30 04/09/23 00:20   Ferritin 8 - 252 NG/ML 3 (L) 11 (L) 497 (H)     Iron 35 - 150 ug/dL 431 (H) 12 (L) 553 (H) 93 77   TIBC 250 - 450 ug/dL 515 (H) 542 (H) 636 (H) 277    Vitamin B-12 193 - 986 pg/mL 463       Iron % Saturation 20 - 50 % 84 (H) 2 (L) 87 (H) 34    (L): Data is abnormally low  (H): Data is abnormally high    Iron levels drawn on 4/5 indicate very high levels of iron, however [facility] RN notes describe that the pre-treatment labs hemolyzed and had to be re-drawn post-treatment, which would explain the exceedingly high levels:  \"Lab called to report ferritin and iron profile specimens have hemolyzed. Samples redrawn prior to patient discharge and sent for processing.\" Susi Garcia RN.     Patient subsequently experienced nausea, vomiting and intermittent fevers and presented to the ED on 4/8/23. A physician interpreted the labs from 4/5 as well as the patient's symptoms as iron toxicity and after discussing with poison control, ordered desferoxamine and iron levels q2h x3. Iron levels were drawn on 4/8 at 21:30 and 4/9 at 0020 (both wnl) and desferoxamine was ordered at 21:30. No record of desferoxamine being administered, however this may be a limitation of the [facility] [place] Chart link as the system had undergone a change from [facility] [place] to [place] in mid 2023. Unsure if desferoxamine was actually given or not.     During admission, patient was found to have 2/4 SIRS criteria which resolved after fluids and a short course of antibiotics. Anion gap indicating metabolic acidosis was present, as well as elevated troponin, tachycardia, and RADHA (Scr 0.6 > 1.15) which resolved after hydration. Patient was discharged on 4/11 after improvement in GI

## 2024-04-16 ENCOUNTER — CLINICAL DOCUMENTATION (OUTPATIENT)
Age: 39
End: 2024-04-16

## 2024-04-16 NOTE — PROGRESS NOTES
Documentation for last week:    --- called patient last week and discussed that per a thorough records review I don't believe that she had true iron overload warranting use of an iron chelator. Per documentation of her infusion and lab draws by the [facility] infusion nurse for the last day of her IV Iron, she had received Iron Sucrose after having labs drawn. However, the initial set was reportedly hemolyzed so there was a repeat draw it appears after her infusion.  Measuring iron labs after IV Iron is administered is not typically done since this will not accurately reflect iron levels.    --- I did however, discussed with her that she still has iron deficiency. We discussed that we were waiting to here about gestational age of her pregnancy. She informed me that the pregnancy was being terminated later this week on Thursday.  I offered support during this difficult time. We discussed that I would recommend that we follow her borderline severe anemia closely. We discussed for her to restart taking her iron.   She is planning to connect with us after.      Please contact us if any new questions or concerns.    Non-Urgent Matters: Call our clinic at [phone] during open clinic hours. Please note that [employer] Messages may not be immediately returned.  Urgent Matters: Call hospital  ([place],[facility], or [facility]) at night or on weekends for questions that cannot wait until clinic opens.  Emergency: Call 9-1-1.    Juan Francisco Doan MD  Hematology/Medical Oncology Provider  [facility]  P: [phone]

## 2024-04-23 DIAGNOSIS — D50.8 IRON DEFICIENCY ANEMIA SECONDARY TO INADEQUATE DIETARY IRON INTAKE: Primary | ICD-10-CM

## 2024-04-23 DIAGNOSIS — D50.8 IRON DEFICIENCY ANEMIA SECONDARY TO INADEQUATE DIETARY IRON INTAKE: ICD-10-CM

## 2024-04-23 LAB
BASOPHILS # BLD: 0.1 K/UL (ref 0–0.1)
BASOPHILS NFR BLD: 1 % (ref 0–1)
DIFFERENTIAL METHOD BLD: ABNORMAL
EOSINOPHIL # BLD: 0 K/UL (ref 0–0.4)
EOSINOPHIL NFR BLD: 0 % (ref 0–7)
ERYTHROCYTE [DISTWIDTH] IN BLOOD BY AUTOMATED COUNT: 28.8 % (ref 11.5–14.5)
FERRITIN SERPL-MCNC: 12 NG/ML (ref 8–252)
HCT VFR BLD AUTO: 33.5 % (ref 35–47)
HGB BLD-MCNC: 9.6 G/DL (ref 11.5–16)
IMM GRANULOCYTES # BLD AUTO: 0 K/UL (ref 0–0.04)
IMM GRANULOCYTES NFR BLD AUTO: 0 % (ref 0–0.5)
IRON SATN MFR SERPL: 11 % (ref 20–50)
IRON SERPL-MCNC: 53 UG/DL (ref 35–150)
LYMPHOCYTES # BLD: 3 K/UL (ref 0.8–3.5)
LYMPHOCYTES NFR BLD: 24 % (ref 12–49)
MCH RBC QN AUTO: 19.6 PG (ref 26–34)
MCHC RBC AUTO-ENTMCNC: 28.7 G/DL (ref 30–36.5)
MCV RBC AUTO: 68.2 FL (ref 80–99)
MONOCYTES # BLD: 1.4 K/UL (ref 0–1)
MONOCYTES NFR BLD: 11 % (ref 5–13)
NEUTS SEG # BLD: 8.2 K/UL (ref 1.8–8)
NEUTS SEG NFR BLD: 64 % (ref 32–75)
NRBC # BLD: 0 K/UL (ref 0–0.01)
NRBC BLD-RTO: 0 PER 100 WBC
PLATELET # BLD AUTO: 657 K/UL (ref 150–400)
PMV BLD AUTO: 9.6 FL (ref 8.9–12.9)
RBC # BLD AUTO: 4.91 M/UL (ref 3.8–5.2)
RBC MORPH BLD: ABNORMAL
TIBC SERPL-MCNC: 481 UG/DL (ref 250–450)
WBC # BLD AUTO: 12.7 K/UL (ref 3.6–11)

## 2024-04-30 ENCOUNTER — OFFICE VISIT (OUTPATIENT)
Facility: CLINIC | Age: 39
End: 2024-04-30
Payer: OTHER GOVERNMENT

## 2024-04-30 VITALS
OXYGEN SATURATION: 100 % | HEART RATE: 94 BPM | TEMPERATURE: 98.7 F | RESPIRATION RATE: 20 BRPM | DIASTOLIC BLOOD PRESSURE: 90 MMHG | WEIGHT: 180.6 LBS | HEIGHT: 63 IN | SYSTOLIC BLOOD PRESSURE: 133 MMHG | BODY MASS INDEX: 32 KG/M2

## 2024-04-30 DIAGNOSIS — G89.29 OTHER CHRONIC PAIN: Primary | ICD-10-CM

## 2024-04-30 DIAGNOSIS — Z86.718 HISTORY OF BLOOD CLOTS: Chronic | ICD-10-CM

## 2024-04-30 PROBLEM — Z3A.01 LESS THAN 8 WEEKS GESTATION OF PREGNANCY: Status: RESOLVED | Noted: 2024-04-02 | Resolved: 2024-04-30

## 2024-04-30 LAB
HCG, PREGNANCY, URINE, POC: NEGATIVE
VALID INTERNAL CONTROL, POC: YES

## 2024-04-30 PROCEDURE — 99214 OFFICE O/P EST MOD 30 MIN: CPT | Performed by: STUDENT IN AN ORGANIZED HEALTH CARE EDUCATION/TRAINING PROGRAM

## 2024-04-30 PROCEDURE — 3080F DIAST BP >= 90 MM HG: CPT | Performed by: STUDENT IN AN ORGANIZED HEALTH CARE EDUCATION/TRAINING PROGRAM

## 2024-04-30 PROCEDURE — 81025 URINE PREGNANCY TEST: CPT | Performed by: STUDENT IN AN ORGANIZED HEALTH CARE EDUCATION/TRAINING PROGRAM

## 2024-04-30 PROCEDURE — 3075F SYST BP GE 130 - 139MM HG: CPT | Performed by: STUDENT IN AN ORGANIZED HEALTH CARE EDUCATION/TRAINING PROGRAM

## 2024-04-30 RX ORDER — PNV NO.95/FERROUS FUM/FOLIC AC 28MG-0.8MG
325 TABLET ORAL DAILY
COMMUNITY
Start: 2020-03-25

## 2024-04-30 ASSESSMENT — ENCOUNTER SYMPTOMS
RHINORRHEA: 0
SHORTNESS OF BREATH: 0
ABDOMINAL PAIN: 0
NAUSEA: 0
COUGH: 0
VOMITING: 0

## 2024-04-30 NOTE — PROGRESS NOTES
dentified pt with two pt identifiers(name and ).    Chief Complaint   Patient presents with    Referral - General     Referral for pain management.  Left foot pain.  History of CRPS & Neuropathy        Health Maintenance Due   Topic    Hepatitis B vaccine (1 of 3 - 3-dose series)    Varicella vaccine (1 of 2 - 2-dose childhood series)    HIV screen     Hepatitis C screen     Cervical cancer screen     Meningococcal B vaccine (2 of 4 - Increased Risk Bexsero 2-dose series)    COVID-19 Vaccine ( season)       Wt Readings from Last 3 Encounters:   24 81.9 kg (180 lb 9.6 oz)   24 82.6 kg (182 lb)   24 80.3 kg (177 lb)     Temp Readings from Last 3 Encounters:   24 98.7 °F (37.1 °C) (Oral)   24 98.1 °F (36.7 °C) (Temporal)   24 98.5 °F (36.9 °C) (Oral)     BP Readings from Last 3 Encounters:   24 (!) 133/90   24 122/74   24 116/75     Pulse Readings from Last 3 Encounters:   24 94   24 81   24 85           Coordination of Care Questionnaire:  :   1. \"Have you been to the ER, urgent care clinic since your last visit?  Hospitalized since your last visit?\" no    2. \"Have you seen or consulted any other health care providers outside of the [facility] since your last visit?\" no     3. For patients aged 45-75: Has the patient had a colonoscopy / FIT/ Cologuard? no      If the patient is female:    4. For patients aged 40-74: Has the patient had a mammogram within the past 2 years? no      5. For patients aged 21-65: Has the patient had a pap smear? no     3) Do you have an Advance Directive on file? no  Are you interested in receiving information about Advance Directives? no    Patient is accompanied by self I have received verbal consent from Rupa Mancera to discuss any/all medical information while they are present in the room.

## 2024-04-30 NOTE — PROGRESS NOTES
Assessment/Plan:     Diagnoses and all orders for this visit:    Other chronic pain  -     AFL - Leo Romero MD, Pain Medicine, [place]  -Chronic.  Secondary to left foot pain from previous surgery  -Is requesting referral to a new pain management provider  -Referral placed today    History of blood clots  -     AMB POC URINE PREGNANCY TEST, VISUAL COLOR COMPARISON  -     rivaroxaban (XARELTO) 20 MG TABS tablet; Take 1 tablet by mouth daily (with breakfast)  -Patient also has a history of unprovoked blood clots  -Previously anticoagulated to Xarelto but switched to Lovenox during pregnancy  -Patient is no longer pregnant since April 11th.  UPT in office today is negative  -Therefore can switch Lovenox back to Xarelto 20 mg daily  -Advised to discuss if lifelong Xarelto treatment is needed with her hematologist.       Return in about 3 months (around 7/30/2024), or if symptoms worsen or fail to improve, for chronic conditions .     Discussed expected course/resolution/complications of diagnosis in detail with patient.    Medication risks/benefits/costs/interactions/alternatives discussed with patient.    Pt expressed understanding with the diagnosis and plan.       Subjective:      Rupa Mancera is a 38 y.o. female who presents for had concerns including Referral - General (Referral for pain management./Left foot pain./History of CRPS & Neuropathy).     Current Outpatient Medications   Medication Sig Dispense Refill    Ferrous Sulfate (IRON) 325 (65 Fe) MG TABS Take 325 mg by mouth daily      rivaroxaban (XARELTO) 20 MG TABS tablet Take 1 tablet by mouth daily (with breakfast) 90 tablet 1    Multiple Vitamin (MULTIVITAMIN PO) Take 1 tablet by mouth daily      Cholecalciferol (VITAMIN D3) 50 MCG (2000 UT) CAPS Take 1 capsule by mouth daily (Patient not taking: Reported on 4/2/2024)       No current facility-administered medications for this visit.       Allergies   Allergen Reactions    Sulfa Antibiotics Hives

## 2024-05-01 ENCOUNTER — TELEPHONE (OUTPATIENT)
Facility: CLINIC | Age: 39
End: 2024-05-01

## 2024-05-01 NOTE — TELEPHONE ENCOUNTER
Pt is requesting to have a referral put in for an OBGYN. Pt states that it was discussed at her appt on the 30th of April but nothing was put in.      Please advise

## 2024-05-05 PROBLEM — Z79.899 HIGH RISK MEDICATION USE: Status: ACTIVE | Noted: 2024-05-05

## 2024-05-06 ENCOUNTER — OFFICE VISIT (OUTPATIENT)
Age: 39
End: 2024-05-06
Payer: OTHER GOVERNMENT

## 2024-05-06 VITALS
OXYGEN SATURATION: 98 % | RESPIRATION RATE: 16 BRPM | DIASTOLIC BLOOD PRESSURE: 98 MMHG | HEART RATE: 63 BPM | TEMPERATURE: 97.8 F | HEIGHT: 63 IN | SYSTOLIC BLOOD PRESSURE: 142 MMHG | BODY MASS INDEX: 31.99 KG/M2

## 2024-05-06 DIAGNOSIS — D50.8 IRON DEFICIENCY ANEMIA SECONDARY TO INADEQUATE DIETARY IRON INTAKE: Primary | ICD-10-CM

## 2024-05-06 DIAGNOSIS — Z79.899 HIGH RISK MEDICATION USE: ICD-10-CM

## 2024-05-06 DIAGNOSIS — R53.83 OTHER FATIGUE: ICD-10-CM

## 2024-05-06 PROCEDURE — 99214 OFFICE O/P EST MOD 30 MIN: CPT | Performed by: INTERNAL MEDICINE

## 2024-05-06 PROCEDURE — 3077F SYST BP >= 140 MM HG: CPT | Performed by: INTERNAL MEDICINE

## 2024-05-06 PROCEDURE — 3080F DIAST BP >= 90 MM HG: CPT | Performed by: INTERNAL MEDICINE

## 2024-05-06 NOTE — PROGRESS NOTES
Chief Complaint   Patient presents with    Follow-up           Vitals:    05/06/24 0952   BP: (!) 142/98   Pulse: 63   Resp: 16   Temp: 97.8 °F (36.6 °C)   SpO2: 98%            1. Have you been to the ER, urgent care clinic since your last visit?  Hospitalized since your last visit?  No  2. Have you seen or consulted any other health care providers outside of the [facility] since your last visit?  Include any pap smears or colon screening. No

## 2024-05-06 NOTE — PROGRESS NOTES
[facility]  [address], [address] [facility] [address]  W: [phone]  F: [phone] Patient ID  Name: Rupa Mancera  YOB: 1985  MRN: [id number]  Referring Provider:   No referring provider defined for this encounter.  Primary Care Provider:   Millie Wise MD       HEMATOLOGY/MEDICAL ONCOLOGY  NOTE     Reason for Evaluation:     Chief Complaint   Patient presents with    Follow-up       Subjective:     History of Present Illness:   Date of Visit: 05/06/24       Rupa Mancera is a 38 y.o. female who presents for a follow-up evaluation for iron deficiency anemia. She is taking iron once/day.      Appetite:Grade 1  Fatigue:Grade 3  Insomnia:Grade 1  Concentration:Grade 1  Anxiety:Grade 1  Pain:5 of 10 foot/achilles Headache:Grade 1      Patient overall reports feeling stable. She is healing from her pregnancy termination.    Current Outpatient Medications   Medication Instructions    Cholecalciferol (VITAMIN D3) 50 MCG (2000 UT) CAPS 1 capsule, DAILY    Iron 325 mg, Oral, DAILY    Multiple Vitamin (MULTIVITAMIN PO) 1 tablet, Oral, DAILY    rivaroxaban (XARELTO) 20 mg, Oral, DAILY WITH BREAKFAST     Allergies   Allergen Reactions    Sulfa Antibiotics Hives       Review of Systems Provided by:  Patient  Review of Systems: A complete review of systems was obtained, reviewed.  Pertinent findings reviewed above.  Past medical history, social history, and family history  are located in the electronic medical record.  Objective:     Vitals:    05/06/24 0952   BP: (!) 142/98   Pulse: 63   Resp: 16   Temp: 97.8 °F (36.6 °C)   SpO2: 98%     ECOG PS: 0- Fully active, able to carry on all pre-disease performance without restriction.  Physical Exam  Constitutional: No acute distress. and Non-toxic appearance.  Eyes: Anicteric sclerae.  Cardiovascular: S1,S2 auscultated. No pitting edema.  Pulmonary: Normal Respiratory Effort. No wheezing. No rhonchi. No rales.

## 2024-05-18 ENCOUNTER — APPOINTMENT (OUTPATIENT)
Facility: HOSPITAL | Age: 39
End: 2024-05-18
Payer: OTHER GOVERNMENT

## 2024-05-18 ENCOUNTER — HOSPITAL ENCOUNTER (EMERGENCY)
Facility: HOSPITAL | Age: 39
Discharge: HOME | End: 2024-05-18
Attending: STUDENT IN AN ORGANIZED HEALTH CARE EDUCATION/TRAINING PROGRAM
Payer: OTHER GOVERNMENT

## 2024-05-18 VITALS
SYSTOLIC BLOOD PRESSURE: 140 MMHG | OXYGEN SATURATION: 100 % | BODY MASS INDEX: 30.12 KG/M2 | TEMPERATURE: 97.8 F | DIASTOLIC BLOOD PRESSURE: 90 MMHG | HEART RATE: 76 BPM | RESPIRATION RATE: 18 BRPM | WEIGHT: 170 LBS | HEIGHT: 63 IN

## 2024-05-18 DIAGNOSIS — M25.562 ACUTE PAIN OF LEFT KNEE: ICD-10-CM

## 2024-05-18 DIAGNOSIS — S89.92XA INJURY OF LEFT KNEE, INITIAL ENCOUNTER: Primary | ICD-10-CM

## 2024-05-18 PROCEDURE — 73562 X-RAY EXAM OF KNEE 3: CPT

## 2024-05-18 PROCEDURE — 6370000000 HC RX 637 (ALT 250 FOR IP): Performed by: NURSE PRACTITIONER

## 2024-05-18 PROCEDURE — 99283 EMERGENCY DEPT VISIT LOW MDM: CPT

## 2024-05-18 RX ORDER — HYDROCODONE BITARTRATE AND ACETAMINOPHEN 5; 325 MG/1; MG/1
1 TABLET ORAL
Status: COMPLETED | OUTPATIENT
Start: 2024-05-18 | End: 2024-05-18

## 2024-05-18 RX ORDER — IBUPROFEN 600 MG/1
600 TABLET ORAL EVERY 6 HOURS PRN
Qty: 20 TABLET | Refills: 0 | Status: SHIPPED | OUTPATIENT
Start: 2024-05-18

## 2024-05-18 RX ADMIN — HYDROCODONE BITARTRATE AND ACETAMINOPHEN 1 TABLET: 5; 325 TABLET ORAL at 15:09

## 2024-05-18 ASSESSMENT — PAIN DESCRIPTION - LOCATION: LOCATION: KNEE

## 2024-05-18 ASSESSMENT — PAIN - FUNCTIONAL ASSESSMENT: PAIN_FUNCTIONAL_ASSESSMENT: 0-10

## 2024-05-18 ASSESSMENT — PAIN DESCRIPTION - ORIENTATION: ORIENTATION: LEFT

## 2024-05-18 ASSESSMENT — PAIN DESCRIPTION - DESCRIPTORS: DESCRIPTORS: ACHING

## 2024-05-18 ASSESSMENT — PAIN SCALES - GENERAL: PAINLEVEL_OUTOF10: 7

## 2024-05-18 NOTE — ED TRIAGE NOTES
Pt arrives to the ER for complaint of left knee pain that started today. Pt reports that she was walking up the stairs when her knee gave out and she heard a pop from the knee.     Pt arrives with crutches.     Reports taking tylenol PTA with no relief.

## 2024-05-18 NOTE — ED PROVIDER NOTES
[facility] EMERGENCY DEPT  EMERGENCY DEPARTMENT ENCOUNTER      Pt Name: Rupa Mancera  MRN: [id number]  Birthdate 1985  Date of evaluation: 2024  Provider: KEYON Abdul NP      HISTORY OF PRESENT ILLNESS      HPI        Nursing Notes were reviewed.    REVIEW OF SYSTEMS         Review of Systems        PAST MEDICAL HISTORY     Past Medical History:   Diagnosis Date    Iron overload 2023    Neuropathy     Reportedly admitted in 2019 in [place]; felt to be related to prolonged hopsitalization.    Pancreatitis 10/20/2022    ? of Alcohol per patient    Sepsis (HCC) 2023         SURGICAL HISTORY       Past Surgical History:   Procedure Laterality Date    ACHILLES TENDON SURGERY Left 2023    APPENDECTOMY       SECTION      SPLENECTOMY      reportedly after a splenic rupture         CURRENT MEDICATIONS       Previous Medications    CHOLECALCIFEROL (VITAMIN D3) 50 MCG ( UT) CAPS    Take 1 capsule by mouth daily    FERROUS SULFATE (IRON) 325 (65 FE) MG TABS    Take 325 mg by mouth daily    MULTIPLE VITAMIN (MULTIVITAMIN PO)    Take 1 tablet by mouth daily    RIVAROXABAN (XARELTO) 20 MG TABS TABLET    Take 1 tablet by mouth daily (with breakfast)       ALLERGIES     Sulfa antibiotics    FAMILY HISTORY       Family History   Problem Relation Age of Onset    Hypertension Father     Ovarian Cancer Mother         reportedly took chemotherapy and had surgery.          SOCIAL HISTORY       Social History     Socioeconomic History    Marital status:    Tobacco Use    Smoking status: Former     Current packs/day: 0.00     Types: Cigarettes     Quit date: 2019     Years since quittin.3    Smokeless tobacco: Never   Vaping Use    Vaping Use: Never used   Substance and Sexual Activity    Alcohol use: Not Currently     Alcohol/week: 3.0 standard drinks of alcohol    Drug use: Never     Social Determinants of Health     Financial Resource Strain: Low Risk  (2023)    Overall

## 2024-05-18 NOTE — ED NOTES
Discharge instructions were reviewed and given to the patient. Patient given a current medication reconciliation form and verbalized understanding of their medications, side affects, medication administration, and time when due. Importance of follow-up and appointment times and dates reviewed. The patient verbalized understanding of discharge instructions and prescriptions, all questions were answered. Patient has no further concerns at this time. Patient stable at time of discharge.    16\" 3 panel splint applied to L leg. PMS intact. Patient educated on use and care.

## 2024-05-21 ASSESSMENT — ENCOUNTER SYMPTOMS
SINUS PRESSURE: 0
SHORTNESS OF BREATH: 0
COUGH: 0
TROUBLE SWALLOWING: 0
SINUS PAIN: 0
VOMITING: 0

## 2024-06-20 DIAGNOSIS — D50.8 IRON DEFICIENCY ANEMIA SECONDARY TO INADEQUATE DIETARY IRON INTAKE: ICD-10-CM

## 2024-06-20 DIAGNOSIS — R53.83 OTHER FATIGUE: ICD-10-CM

## 2024-06-20 LAB
BASOPHILS # BLD: 0.1 K/UL (ref 0–0.1)
BASOPHILS NFR BLD: 1 % (ref 0–1)
DIFFERENTIAL METHOD BLD: ABNORMAL
EOSINOPHIL # BLD: 0 K/UL (ref 0–0.4)
EOSINOPHIL NFR BLD: 0 % (ref 0–7)
ERYTHROCYTE [DISTWIDTH] IN BLOOD BY AUTOMATED COUNT: ABNORMAL % (ref 11.5–14.5)
FERRITIN SERPL-MCNC: 19 NG/ML (ref 8–252)
HCT VFR BLD AUTO: 41.6 % (ref 35–47)
HGB BLD-MCNC: 12.9 G/DL (ref 11.5–16)
IMM GRANULOCYTES # BLD AUTO: 0 K/UL (ref 0–0.04)
IMM GRANULOCYTES NFR BLD AUTO: 0 % (ref 0–0.5)
IRON SATN MFR SERPL: 9 % (ref 20–50)
IRON SERPL-MCNC: 35 UG/DL (ref 35–150)
LYMPHOCYTES # BLD: 3.2 K/UL (ref 0.8–3.5)
LYMPHOCYTES NFR BLD: 31 % (ref 12–49)
MCH RBC QN AUTO: 24.9 PG (ref 26–34)
MCHC RBC AUTO-ENTMCNC: 31.7 G/DL (ref 30–36.5)
MCV RBC AUTO: 78.5 FL (ref 80–99)
MONOCYTES # BLD: 1.1 K/UL (ref 0–1)
MONOCYTES NFR BLD: 10 % (ref 5–13)
NEUTS SEG # BLD: 6.1 K/UL (ref 1.8–8)
NEUTS SEG NFR BLD: 58 % (ref 32–75)
NRBC # BLD: 0 K/UL (ref 0–0.01)
NRBC BLD-RTO: 0 PER 100 WBC
PLATELET # BLD AUTO: 367 K/UL (ref 150–400)
PMV BLD AUTO: 10.6 FL (ref 8.9–12.9)
RBC # BLD AUTO: 5.3 M/UL (ref 3.8–5.2)
TIBC SERPL-MCNC: 372 UG/DL (ref 250–450)
WBC # BLD AUTO: 10.5 K/UL (ref 3.6–11)

## 2024-06-21 ENCOUNTER — PATIENT MESSAGE (OUTPATIENT)
Age: 39
End: 2024-06-21

## 2024-07-30 ENCOUNTER — OFFICE VISIT (OUTPATIENT)
Facility: CLINIC | Age: 39
End: 2024-07-30
Payer: OTHER GOVERNMENT

## 2024-07-30 VITALS
WEIGHT: 178.4 LBS | BODY MASS INDEX: 31.61 KG/M2 | HEART RATE: 81 BPM | SYSTOLIC BLOOD PRESSURE: 120 MMHG | TEMPERATURE: 98.4 F | HEIGHT: 63 IN | OXYGEN SATURATION: 97 % | DIASTOLIC BLOOD PRESSURE: 80 MMHG

## 2024-07-30 DIAGNOSIS — E66.9 CLASS 1 OBESITY WITH BODY MASS INDEX (BMI) OF 31.0 TO 31.9 IN ADULT, UNSPECIFIED OBESITY TYPE, UNSPECIFIED WHETHER SERIOUS COMORBIDITY PRESENT: ICD-10-CM

## 2024-07-30 DIAGNOSIS — R40.0 DAYTIME SLEEPINESS: Primary | ICD-10-CM

## 2024-07-30 DIAGNOSIS — R53.83 OTHER FATIGUE: ICD-10-CM

## 2024-07-30 PROCEDURE — 3079F DIAST BP 80-89 MM HG: CPT | Performed by: STUDENT IN AN ORGANIZED HEALTH CARE EDUCATION/TRAINING PROGRAM

## 2024-07-30 PROCEDURE — 3074F SYST BP LT 130 MM HG: CPT | Performed by: STUDENT IN AN ORGANIZED HEALTH CARE EDUCATION/TRAINING PROGRAM

## 2024-07-30 PROCEDURE — 99213 OFFICE O/P EST LOW 20 MIN: CPT | Performed by: STUDENT IN AN ORGANIZED HEALTH CARE EDUCATION/TRAINING PROGRAM

## 2024-07-30 RX ORDER — HYDROCODONE BITARTRATE AND ACETAMINOPHEN 10; 325 MG/1; MG/1
1 TABLET ORAL EVERY 8 HOURS PRN
COMMUNITY
Start: 2024-07-26

## 2024-07-30 RX ORDER — NORTRIPTYLINE HYDROCHLORIDE 10 MG/1
10 CAPSULE ORAL NIGHTLY
COMMUNITY
Start: 2024-07-26

## 2024-07-30 RX ORDER — PREGABALIN 100 MG/1
100 CAPSULE ORAL 2 TIMES DAILY
COMMUNITY
Start: 2024-06-26

## 2024-07-30 ASSESSMENT — PATIENT HEALTH QUESTIONNAIRE - PHQ9
SUM OF ALL RESPONSES TO PHQ QUESTIONS 1-9: 0
SUM OF ALL RESPONSES TO PHQ9 QUESTIONS 1 & 2: 0
SUM OF ALL RESPONSES TO PHQ QUESTIONS 1-9: 0
2. FEELING DOWN, DEPRESSED OR HOPELESS: NOT AT ALL
1. LITTLE INTEREST OR PLEASURE IN DOING THINGS: NOT AT ALL
SUM OF ALL RESPONSES TO PHQ QUESTIONS 1-9: 0
SUM OF ALL RESPONSES TO PHQ QUESTIONS 1-9: 0

## 2024-07-30 ASSESSMENT — ENCOUNTER SYMPTOMS
SHORTNESS OF BREATH: 0
VOMITING: 0
NAUSEA: 0
RHINORRHEA: 0
COUGH: 0
ABDOMINAL PAIN: 0

## 2024-07-30 NOTE — PROGRESS NOTES
Identified pt with two pt identifiers(name and ). Reviewed record in preparation for visit and have obtained necessary documentation. All patient medications has been reviewed.  Chief Complaint   Patient presents with    3 Month Follow-Up       Vitals:    24 0835   BP: 120/80   Pulse: 81   Temp: 98.4 °F (36.9 °C)   SpO2: 97%                   Coordination of Care Questionnaire:   1) Have you been to an emergency room, urgent care, or hospitalized since your last visit?   Yes seen in  ED in May for knee pain.     2. Have seen or consulted any other health care provider since your last visit? no            
Running Out of Food in the Last Year: Sometimes true     Ran Out of Food in the Last Year: Never true   Transportation Needs: Unmet Transportation Needs (5/16/2023)    PRAPARE - Transportation     Lack of Transportation (Medical): Yes     Lack of Transportation (Non-Medical): Yes   Physical Activity: Not on file   Stress: Not on file   Social Connections: Not on file   Intimate Partner Violence: Not on file   Housing Stability: Low Risk  (5/16/2023)    Housing Stability Vital Sign     Unable to Pay for Housing in the Last Year: No     Number of Places Lived in the Last Year: 1     Unstable Housing in the Last Year: No        Patient is a 39-year-old female who presents to the office today for routine follow-up.  Patient states her main concern today is her persistent fatigue.  She states that she has chronic fatigue that has been ongoing for many months.  She states that she was recently put back on Lyrica and nortriptyline by pain management but her symptoms have been present prior to being on these medications.  She does believe she breathes heavily when she sleeps but is not certain if she snores.  She will get about 6 hours of sleep nightly.  She will wake up feeling fatigued and like she is not fully rested.  She has had lab work completed by our office as well as hematology without any known etiology.  She also states she is having difficulty losing weight.  She has been making significant dietary changes and trying to stay active but has been unable to lose weight on her own.  She is interested in possible weight loss management.  She has been following with GYN and states she recently had an ultrasound completed that showed no abnormalities.  Also of note she states she does have some mild anxiety.  She feels that she does not need any medication management at this time.  She denies any depression.  Patient denies any other acute concerns.  Of note patient states that her hematologist took her off of her

## 2024-08-13 ENCOUNTER — TELEPHONE (OUTPATIENT)
Age: 39
End: 2024-08-13

## 2024-08-13 NOTE — TELEPHONE ENCOUNTER
Called patient regarding referral to our [facility]. Patient did not answer so I left a vmail with our contact information.

## 2024-11-04 ENCOUNTER — TELEPHONE (OUTPATIENT)
Age: 39
End: 2024-11-04

## 2024-11-04 NOTE — TELEPHONE ENCOUNTER
Pt called to re schedule her apt due to insurance purposes.  I offered the pt several times on 12/2/24.  Pt chose 1:45

## 2024-12-02 ENCOUNTER — OFFICE VISIT (OUTPATIENT)
Age: 39
End: 2024-12-02
Payer: COMMERCIAL

## 2024-12-02 VITALS
WEIGHT: 178.4 LBS | BODY MASS INDEX: 31.61 KG/M2 | OXYGEN SATURATION: 99 % | HEIGHT: 63 IN | DIASTOLIC BLOOD PRESSURE: 82 MMHG | HEART RATE: 80 BPM | SYSTOLIC BLOOD PRESSURE: 124 MMHG

## 2024-12-02 DIAGNOSIS — R53.83 OTHER FATIGUE: Primary | ICD-10-CM

## 2024-12-02 DIAGNOSIS — R42 DIZZINESS: ICD-10-CM

## 2024-12-02 DIAGNOSIS — R53.83 OTHER FATIGUE: ICD-10-CM

## 2024-12-02 DIAGNOSIS — Z86.39 HISTORY OF IRON DEFICIENCY: ICD-10-CM

## 2024-12-02 PROCEDURE — 3078F DIAST BP <80 MM HG: CPT | Performed by: INTERNAL MEDICINE

## 2024-12-02 PROCEDURE — 99214 OFFICE O/P EST MOD 30 MIN: CPT | Performed by: INTERNAL MEDICINE

## 2024-12-02 PROCEDURE — 3074F SYST BP LT 130 MM HG: CPT | Performed by: INTERNAL MEDICINE

## 2024-12-02 RX ORDER — OXYCODONE AND ACETAMINOPHEN 10; 325 MG/1; MG/1
TABLET ORAL
COMMUNITY
Start: 2024-11-07

## 2024-12-02 NOTE — PATIENT INSTRUCTIONS
Treatment Plan:  Continue taking the daily iron tablet.  A complete blood count (CBC), iron profile, and ferritin will be ordered to check for anemia recurrence.  An assessment for orthostatic hypotension will be conducted in the office.  Consider a sleep study to evaluate for potential sleep apnea.  Follow up with the primary care provider for further insights or suggestions.     Follow-Up Instructions:  Continue taking the daily iron tablet.  Follow up with the primary care provider regarding fatigue and dizziness.  Undergo the ordered lab tests (CBC, iron profile, and ferritin).  Consider scheduling a sleep study if recommended by the primary care provider.     Additional Notes:  The patient is advised to maintain adequate hydration and a healthy diet.  The patient should monitor for any changes in symptoms and report them to the primary care provider.

## 2024-12-02 NOTE — PROGRESS NOTES
Patient identified by name and date of birth  Rupa Mancera is a 39 y.o. female   Chief Complaint   Patient presents with    Follow-up    Anemia     Iron deficiency anemia secondary to inadequate dietary iron intake      Vitals:    12/02/24 1348   BP: 124/76   Site: Right Upper Arm   Pulse: 76   SpO2: 99%   Weight: 80.9 kg (178 lb 6.4 oz)   Height: 1.6 m (5' 3\")       No LMP recorded.        \"Have you been to the ER, urgent care clinic since your last visit?  Hospitalized since your last visit?\"    NO    “Have you seen or consulted any other health care providers outside of [facility] since your last visit?”    NO      
Normal Gait.    Results:   I personally reviewed pertinent labs/results, [facility] labs/results below:    Lab Results   Component Value Date    WBC 10.5 06/20/2024    HGB 12.9 06/20/2024    HCT 41.6 06/20/2024     06/20/2024    MCV 78.5 (L) 06/20/2024    NEUTROABS 6.1 06/20/2024     Lab Results   Component Value Date     (L) 04/02/2024    K 4.1 04/02/2024     04/02/2024    CO2 22 04/02/2024    GLUCOSE 102 (H) 04/02/2024    BUN 8 04/02/2024    CREATININE 0.52 (L) 04/02/2024    LABGLOM >90 04/02/2024    CALCIUM 9.2 04/02/2024    MG 1.8 04/21/2023     Lab Results   Component Value Date    BILITOT 0.4 04/02/2024    ALT 13 04/02/2024    AST 13 (L) 04/02/2024    ALKPHOS 91 04/02/2024    GLOB 4.1 (H) 04/02/2024       Lab Results   Component Value Date    IRON 35 06/20/2024    TIBC 372 06/20/2024    IRONPERSAT 9 (L) 06/20/2024    FERRITIN 19 06/20/2024    HIWQBPWO31 460 04/05/2024    FOLATE 24.6 (H) 04/05/2024     Lab Results   Component Value Date    INR 1.0 04/08/2023    PROTIME 10.8 04/08/2023    APTT 26.9 04/08/2023    LIPASE 119 04/08/2023    TSH 0.92 04/02/2024     Assessment and Recommendations:     1. Other fatigue  Anemia recurrence will be checked, along with an assessment for orthostatic hypotension in the office. She is advised to continue her follow-ups with her primary care provider for further insights or suggestions. A sleep study may be considered to evaluate for potential sleep apnea.  - CBC with Auto Differential; Future  - Ferritin; Future  - Iron and TIBC; Future    2. History of iron deficiency  Her last evaluation in June 2024 did not indicate any signs of iron deficiency, with her hemoglobin levels being satisfactory in the 12 range. A complete blood count (CBC), iron profile, and ferritin will be ordered today. She is advised to continue taking her daily iron tablet.  - CBC with Auto Differential; Future  - Ferritin; Future  - Iron and TIBC; Future    3. Dizziness  Our office

## 2024-12-03 PROBLEM — R42 DIZZINESS: Status: ACTIVE | Noted: 2024-12-03

## 2024-12-04 LAB
BASOPHILS # BLD: 0.1 K/UL (ref 0–0.1)
BASOPHILS NFR BLD: 1 % (ref 0–1)
DIFFERENTIAL METHOD BLD: ABNORMAL
EOSINOPHIL # BLD: 0 K/UL (ref 0–0.4)
EOSINOPHIL NFR BLD: 0 % (ref 0–7)
ERYTHROCYTE [DISTWIDTH] IN BLOOD BY AUTOMATED COUNT: 13.5 % (ref 11.5–14.5)
FERRITIN SERPL-MCNC: 32 NG/ML (ref 8–252)
HCT VFR BLD AUTO: 40.7 % (ref 35–47)
HGB BLD-MCNC: 13.7 G/DL (ref 11.5–16)
IMM GRANULOCYTES # BLD AUTO: 0 K/UL (ref 0–0.04)
IMM GRANULOCYTES NFR BLD AUTO: 1 % (ref 0–0.5)
IRON SATN MFR SERPL: 6 % (ref 20–50)
IRON SERPL-MCNC: 24 UG/DL (ref 35–150)
LYMPHOCYTES # BLD: 3.2 K/UL (ref 0.8–3.5)
LYMPHOCYTES NFR BLD: 41 % (ref 12–49)
MCH RBC QN AUTO: 29.8 PG (ref 26–34)
MCHC RBC AUTO-ENTMCNC: 33.7 G/DL (ref 30–36.5)
MCV RBC AUTO: 88.5 FL (ref 80–99)
MONOCYTES # BLD: 1 K/UL (ref 0–1)
MONOCYTES NFR BLD: 13 % (ref 5–13)
NEUTS SEG # BLD: 3.4 K/UL (ref 1.8–8)
NEUTS SEG NFR BLD: 44 % (ref 32–75)
NRBC # BLD: 0 K/UL (ref 0–0.01)
NRBC BLD-RTO: 0 PER 100 WBC
PLATELET # BLD AUTO: 418 K/UL (ref 150–400)
PMV BLD AUTO: 10.3 FL (ref 8.9–12.9)
RBC # BLD AUTO: 4.6 M/UL (ref 3.8–5.2)
TIBC SERPL-MCNC: 411 UG/DL (ref 250–450)
WBC # BLD AUTO: 7.7 K/UL (ref 3.6–11)

## 2025-02-03 ENCOUNTER — OFFICE VISIT (OUTPATIENT)
Facility: CLINIC | Age: 40
End: 2025-02-03
Payer: COMMERCIAL

## 2025-02-03 VITALS
WEIGHT: 183 LBS | TEMPERATURE: 97.8 F | RESPIRATION RATE: 16 BRPM | HEIGHT: 63 IN | HEART RATE: 98 BPM | SYSTOLIC BLOOD PRESSURE: 99 MMHG | OXYGEN SATURATION: 98 % | DIASTOLIC BLOOD PRESSURE: 68 MMHG | BODY MASS INDEX: 32.43 KG/M2

## 2025-02-03 DIAGNOSIS — R53.83 OTHER FATIGUE: ICD-10-CM

## 2025-02-03 DIAGNOSIS — H81.10 BENIGN PAROXYSMAL POSITIONAL VERTIGO, UNSPECIFIED LATERALITY: ICD-10-CM

## 2025-02-03 DIAGNOSIS — R40.0 DAYTIME SLEEPINESS: Primary | ICD-10-CM

## 2025-02-03 DIAGNOSIS — E66.811 CLASS 1 OBESITY WITH BODY MASS INDEX (BMI) OF 32.0 TO 32.9 IN ADULT, UNSPECIFIED OBESITY TYPE, UNSPECIFIED WHETHER SERIOUS COMORBIDITY PRESENT: ICD-10-CM

## 2025-02-03 PROCEDURE — 3074F SYST BP LT 130 MM HG: CPT | Performed by: STUDENT IN AN ORGANIZED HEALTH CARE EDUCATION/TRAINING PROGRAM

## 2025-02-03 PROCEDURE — 3078F DIAST BP <80 MM HG: CPT | Performed by: STUDENT IN AN ORGANIZED HEALTH CARE EDUCATION/TRAINING PROGRAM

## 2025-02-03 PROCEDURE — 99213 OFFICE O/P EST LOW 20 MIN: CPT | Performed by: STUDENT IN AN ORGANIZED HEALTH CARE EDUCATION/TRAINING PROGRAM

## 2025-02-03 RX ORDER — PREGABALIN 150 MG/1
150 CAPSULE ORAL 2 TIMES DAILY
COMMUNITY

## 2025-02-03 ASSESSMENT — PATIENT HEALTH QUESTIONNAIRE - PHQ9
SUM OF ALL RESPONSES TO PHQ QUESTIONS 1-9: 0
1. LITTLE INTEREST OR PLEASURE IN DOING THINGS: NOT AT ALL
SUM OF ALL RESPONSES TO PHQ9 QUESTIONS 1 & 2: 0
2. FEELING DOWN, DEPRESSED OR HOPELESS: NOT AT ALL
SUM OF ALL RESPONSES TO PHQ QUESTIONS 1-9: 0

## 2025-02-03 ASSESSMENT — ENCOUNTER SYMPTOMS
SHORTNESS OF BREATH: 0
VOMITING: 0
ABDOMINAL PAIN: 0
RHINORRHEA: 0
NAUSEA: 0
COUGH: 0

## 2025-02-03 NOTE — PROGRESS NOTES
\"Have you been to the ER, urgent care clinic since your last visit?  Hospitalized since your last visit?\"    NO    “Have you seen or consulted any other health care providers outside our system since your last visit?”      NO   “Have you had a pap smear?”    Yes, 07/2024 [facility]    No cervical cancer screening on file             
of diagnosis in detail with patient.    Medication risks/benefits/costs/interactions/alternatives discussed with patient.    Pt expressed understanding with the diagnosis and plan      Subjective:      Rupa Mancera is a 39 y.o. female who presents for had concerns including 6 Month Follow-Up.     Current Outpatient Medications   Medication Sig Dispense Refill    pregabalin (LYRICA) 150 MG capsule Take 1 capsule by mouth 2 times daily. Max Daily Amount: 300 mg      oxyCODONE-acetaminophen (PERCOCET)  MG per tablet Take 1 tablet by mouth every 6 hours as needed for Pain.      nortriptyline (PAMELOR) 10 MG capsule Take 1 capsule by mouth nightly      Etonogestrel (NEXPLANON SC) Inject into the skin      Ferrous Sulfate (IRON) 325 (65 Fe) MG TABS Take 325 mg by mouth daily      Multiple Vitamin (MULTIVITAMIN PO) Take 1 tablet by mouth daily       No current facility-administered medications for this visit.       Allergies   Allergen Reactions    Sulfa Antibiotics Hives     Past Medical History:   Diagnosis Date    Iron overload 2023    Neuropathy     Reportedly admitted in 2019 in [place]; felt to be related to prolonged hopsitalization.    Pancreatitis 10/20/2022    ? of Alcohol per patient    Sepsis (HCC) 2023      Past Surgical History:   Procedure Laterality Date    ACHILLES TENDON SURGERY Left 2023    APPENDECTOMY       SECTION      SPLENECTOMY      reportedly after a splenic rupture      Family History   Problem Relation Age of Onset    Hypertension Father     Ovarian Cancer Mother         reportedly took chemotherapy and had surgery.      Social History     Socioeconomic History    Marital status:      Spouse name: Not on file    Number of children: Not on file    Years of education: Not on file    Highest education level: Not on file   Occupational History    Not on file   Tobacco Use    Smoking status: Former     Current packs/day: 0.00     Types: Cigarettes     Quit date:

## 2025-02-06 ENCOUNTER — TELEPHONE (OUTPATIENT)
Age: 40
End: 2025-02-06

## 2025-02-06 NOTE — TELEPHONE ENCOUNTER
Called patient regarding referral to our [facility]. Patient did not answer so I left a vmail with our contact information.

## 2025-02-13 ENCOUNTER — TELEPHONE (OUTPATIENT)
Age: 40
End: 2025-02-13

## 2025-02-13 NOTE — TELEPHONE ENCOUNTER
Called pt to reschedule appt due to provider being out of office.  Pt did not answer.  Lm with office CB#    Move to next available appt

## 2025-02-13 NOTE — TELEPHONE ENCOUNTER
Sent medical weight loss orientation via e-mail; Patient will view recording, contact insurance to verify coverage and send an email to the  for next steps

## 2025-03-13 NOTE — TELEPHONE ENCOUNTER
Called patient and discussed the recent lab work and advised patient there was likely no need for an appointment that we would refer patient to hematology/oncology for further evaluation and treatment of elevated platelets. We discussed the history of pancreatitis and that this seemed to be resolving but the platelets were continued to increase. Patient indicated that she is having worsening fatigue with no explanation. I advised patient that I would do a referral to hematology oncology and have our referral coordinator work to get her an appointment.   Patient verbalized understanding and will wait for phone call
no

## 2025-05-05 NOTE — PROGRESS NOTES
Transition of Care Plan: RUR-9%  Medical management continues  Continues with abx and protonix  CM following for d/c needs-none anticipated  Pt's spouse to transport her home at d/c  Pt to follow up OP  LAM Alonzo [phone]

## 2025-06-12 ENCOUNTER — OFFICE VISIT (OUTPATIENT)
Age: 40
End: 2025-06-12

## 2025-06-12 VITALS
SYSTOLIC BLOOD PRESSURE: 135 MMHG | DIASTOLIC BLOOD PRESSURE: 93 MMHG | WEIGHT: 172 LBS | OXYGEN SATURATION: 97 % | HEART RATE: 101 BPM | HEIGHT: 63 IN | BODY MASS INDEX: 30.48 KG/M2

## 2025-06-12 DIAGNOSIS — Z86.39 HISTORY OF IRON DEFICIENCY: Primary | ICD-10-CM

## 2025-06-12 DIAGNOSIS — R53.83 OTHER FATIGUE: ICD-10-CM

## 2025-06-12 DIAGNOSIS — Z86.39 HISTORY OF IRON DEFICIENCY: ICD-10-CM

## 2025-06-12 ASSESSMENT — PATIENT HEALTH QUESTIONNAIRE - PHQ9
SUM OF ALL RESPONSES TO PHQ QUESTIONS 1-9: 0
1. LITTLE INTEREST OR PLEASURE IN DOING THINGS: NOT AT ALL
SUM OF ALL RESPONSES TO PHQ QUESTIONS 1-9: 0
2. FEELING DOWN, DEPRESSED OR HOPELESS: NOT AT ALL

## 2025-06-12 NOTE — PROGRESS NOTES
Rupa Mancera is a 39 y.o. female   Follow-up    Vitals:    06/12/25 1127   BP: (!) 135/93   Pulse: (!) 101   SpO2: 97%   Weight: 78 kg (172 lb)   Height: 1.6 m (5' 3\")     No LMP recorded.    \"Have you been to the ER, urgent care clinic since your last visit?  Hospitalized since your last visit?\"    NO    “Have you seen or consulted any other health care providers outside of [facility] since your last visit?”    NO      
into sleep study. Pt follows closely with primary care and will be following up with them for further insights into fatigue.   - Repeat CBC, ferritin, iron and TIBC today     2. History of iron deficiency  - Last seen in December 2024, and her labs were borderline low with ferritin of 32 and iron of 24. Although her hemoglobin was stable at 13.7.   - Given patient no longer tolerating oral iron, discussed potential of IV iron infusions. Patient has had iron infusions in the past and tolerated them well.   - Repeat CBC, ferritin, iron and TIBC today     FOLLOW-UP:  As needed. Pt prefers to check labs with PCP and call for future appointments as needed.     Signed By:   KEYON Sawant - CNP   Patient reviewed with Dr. Doan prior to visit.

## 2025-06-13 LAB
BASOPHILS # BLD: 0.14 K/UL (ref 0–0.1)
BASOPHILS NFR BLD: 1.7 % (ref 0–1)
DIFFERENTIAL METHOD BLD: ABNORMAL
EOSINOPHIL # BLD: 0 K/UL (ref 0–0.4)
EOSINOPHIL NFR BLD: 0 % (ref 0–7)
ERYTHROCYTE [DISTWIDTH] IN BLOOD BY AUTOMATED COUNT: 15.2 % (ref 11.5–14.5)
FERRITIN SERPL-MCNC: 71 NG/ML (ref 8–252)
HCT VFR BLD AUTO: 42.9 % (ref 35–47)
HGB BLD-MCNC: 13.8 G/DL (ref 11.5–16)
IMM GRANULOCYTES # BLD AUTO: 0.05 K/UL (ref 0–0.04)
IMM GRANULOCYTES NFR BLD AUTO: 0.6 % (ref 0–0.5)
IRON SATN MFR SERPL: 19 % (ref 20–50)
IRON SERPL-MCNC: 77 UG/DL (ref 35–150)
LYMPHOCYTES # BLD: 3.18 K/UL (ref 0.8–3.5)
LYMPHOCYTES NFR BLD: 37.7 % (ref 12–49)
MCH RBC QN AUTO: 29.4 PG (ref 26–34)
MCHC RBC AUTO-ENTMCNC: 32.2 G/DL (ref 30–36.5)
MCV RBC AUTO: 91.5 FL (ref 80–99)
MONOCYTES # BLD: 0.92 K/UL (ref 0–1)
MONOCYTES NFR BLD: 10.9 % (ref 5–13)
NEUTS SEG # BLD: 4.15 K/UL (ref 1.8–8)
NEUTS SEG NFR BLD: 49.1 % (ref 32–75)
NRBC # BLD: 0 K/UL (ref 0–0.01)
NRBC BLD-RTO: 0 PER 100 WBC
PLATELET # BLD AUTO: 455 K/UL (ref 150–400)
PMV BLD AUTO: 10.4 FL (ref 8.9–12.9)
RBC # BLD AUTO: 4.69 M/UL (ref 3.8–5.2)
TIBC SERPL-MCNC: 395 UG/DL (ref 250–450)
WBC # BLD AUTO: 8.4 K/UL (ref 3.6–11)

## 2025-06-16 ENCOUNTER — RESULTS FOLLOW-UP (OUTPATIENT)
Age: 40
End: 2025-06-16

## 2025-07-04 ENCOUNTER — HOSPITAL ENCOUNTER (EMERGENCY)
Facility: HOSPITAL | Age: 40
Discharge: HOME | End: 2025-07-04
Attending: EMERGENCY MEDICINE
Payer: COMMERCIAL

## 2025-07-04 ENCOUNTER — APPOINTMENT (OUTPATIENT)
Facility: HOSPITAL | Age: 40
End: 2025-07-04
Payer: COMMERCIAL

## 2025-07-04 VITALS
RESPIRATION RATE: 21 BRPM | BODY MASS INDEX: 29.88 KG/M2 | OXYGEN SATURATION: 98 % | HEIGHT: 63 IN | SYSTOLIC BLOOD PRESSURE: 137 MMHG | TEMPERATURE: 98.2 F | WEIGHT: 168.65 LBS | DIASTOLIC BLOOD PRESSURE: 84 MMHG | HEART RATE: 96 BPM

## 2025-07-04 DIAGNOSIS — E86.0 DEHYDRATION: ICD-10-CM

## 2025-07-04 DIAGNOSIS — R19.7 NAUSEA VOMITING AND DIARRHEA: Primary | ICD-10-CM

## 2025-07-04 DIAGNOSIS — E87.1 HYPONATREMIA: ICD-10-CM

## 2025-07-04 DIAGNOSIS — R11.2 NAUSEA VOMITING AND DIARRHEA: Primary | ICD-10-CM

## 2025-07-04 LAB
ALBUMIN SERPL-MCNC: 4.3 G/DL (ref 3.5–5)
ALBUMIN/GLOB SERPL: 0.8 (ref 1.1–2.2)
ALP SERPL-CCNC: 182 U/L (ref 45–117)
ALT SERPL-CCNC: 20 U/L (ref 12–78)
AMPHET UR QL SCN: NEGATIVE
ANION GAP SERPL CALC-SCNC: 11 MMOL/L (ref 2–12)
ANION GAP SERPL CALC-SCNC: 14 MMOL/L (ref 2–12)
APPEARANCE UR: ABNORMAL
AST SERPL-CCNC: 19 U/L (ref 15–37)
BACTERIA URNS QL MICRO: ABNORMAL /HPF
BARBITURATES UR QL SCN: NEGATIVE
BASOPHILS # BLD: 0.04 K/UL (ref 0–0.1)
BASOPHILS NFR BLD: 0.2 % (ref 0–1)
BENZODIAZ UR QL: NEGATIVE
BILIRUB SERPL-MCNC: 1.6 MG/DL (ref 0.2–1)
BILIRUB UR QL CFM: NEGATIVE
BUN SERPL-MCNC: 19 MG/DL (ref 6–20)
BUN SERPL-MCNC: 20 MG/DL (ref 6–20)
BUN/CREAT SERPL: 21 (ref 12–20)
BUN/CREAT SERPL: 27 (ref 12–20)
CA-I BLD-SCNC: 1.13 MMOL/L (ref 1.12–1.32)
CALCIUM SERPL-MCNC: 10.5 MG/DL (ref 8.5–10.1)
CALCIUM SERPL-MCNC: 9 MG/DL (ref 8.5–10.1)
CANNABINOIDS UR QL SCN: NEGATIVE
CHLORIDE SERPL-SCNC: 93 MMOL/L (ref 97–108)
CHLORIDE SERPL-SCNC: 96 MMOL/L (ref 97–108)
CO2 SERPL-SCNC: 19 MMOL/L (ref 21–32)
CO2 SERPL-SCNC: 20 MMOL/L (ref 21–32)
COCAINE UR QL SCN: NEGATIVE
COLOR UR: ABNORMAL
COMMENT:: NORMAL
CREAT SERPL-MCNC: 0.74 MG/DL (ref 0.55–1.02)
CREAT SERPL-MCNC: 0.91 MG/DL (ref 0.55–1.02)
DIFFERENTIAL METHOD BLD: ABNORMAL
EOSINOPHIL # BLD: 0 K/UL (ref 0–0.4)
EOSINOPHIL NFR BLD: 0 % (ref 0–7)
EPITH CASTS URNS QL MICRO: ABNORMAL /LPF
ERYTHROCYTE [DISTWIDTH] IN BLOOD BY AUTOMATED COUNT: 14.5 % (ref 11.5–14.5)
GLOBULIN SER CALC-MCNC: 5.1 G/DL (ref 2–4)
GLUCOSE SERPL-MCNC: 128 MG/DL (ref 65–100)
GLUCOSE SERPL-MCNC: 140 MG/DL (ref 65–100)
GLUCOSE UR STRIP.AUTO-MCNC: 100 MG/DL
HCG UR QL: NEGATIVE
HCT VFR BLD AUTO: 47.7 % (ref 35–47)
HGB BLD-MCNC: 16.8 G/DL (ref 11.5–16)
HGB UR QL STRIP: ABNORMAL
HYALINE CASTS URNS QL MICRO: >20 /LPF (ref 0–5)
IMM GRANULOCYTES # BLD AUTO: 0.07 K/UL (ref 0–0.04)
IMM GRANULOCYTES NFR BLD AUTO: 0.4 % (ref 0–0.5)
KETONES UR QL STRIP.AUTO: 80 MG/DL
LEUKOCYTE ESTERASE UR QL STRIP.AUTO: NEGATIVE
LIPASE SERPL-CCNC: 17 U/L (ref 13–75)
LYMPHOCYTES # BLD: 1.63 K/UL (ref 0.8–3.5)
LYMPHOCYTES NFR BLD: 10.1 % (ref 12–49)
Lab: NORMAL
MCH RBC QN AUTO: 30.2 PG (ref 26–34)
MCHC RBC AUTO-ENTMCNC: 35.2 G/DL (ref 30–36.5)
MCV RBC AUTO: 85.6 FL (ref 80–99)
METHADONE UR QL: NEGATIVE
MONOCYTES # BLD: 0.88 K/UL (ref 0–1)
MONOCYTES NFR BLD: 5.4 % (ref 5–13)
MUCOUS THREADS URNS QL MICRO: ABNORMAL /LPF
NEUTS SEG # BLD: 13.55 K/UL (ref 1.8–8)
NEUTS SEG NFR BLD: 83.9 % (ref 32–75)
NITRITE UR QL STRIP.AUTO: NEGATIVE
NRBC # BLD: 0 K/UL (ref 0–0.01)
NRBC BLD-RTO: 0 PER 100 WBC
OPIATES UR QL: NEGATIVE
PCP UR QL: NEGATIVE
PH UR STRIP: 5.5 (ref 5–8)
PLATELET # BLD AUTO: 558 K/UL (ref 150–400)
PMV BLD AUTO: 9.8 FL (ref 8.9–12.9)
POTASSIUM SERPL-SCNC: 3.6 MMOL/L (ref 3.5–5.1)
POTASSIUM SERPL-SCNC: 3.8 MMOL/L (ref 3.5–5.1)
PROT SERPL-MCNC: 9.4 G/DL (ref 6.4–8.2)
PROT UR STRIP-MCNC: 300 MG/DL
RBC # BLD AUTO: 5.57 M/UL (ref 3.8–5.2)
RBC #/AREA URNS HPF: ABNORMAL /HPF (ref 0–5)
SODIUM SERPL-SCNC: 126 MMOL/L (ref 136–145)
SODIUM SERPL-SCNC: 127 MMOL/L (ref 136–145)
SP GR UR REFRACTOMETRY: 1.03 (ref 1–1.03)
SPECIMEN HOLD: NORMAL
UROBILINOGEN UR QL STRIP.AUTO: 1 EU/DL (ref 0.2–1)
WBC # BLD AUTO: 16.2 K/UL (ref 3.6–11)
WBC URNS QL MICRO: ABNORMAL /HPF (ref 0–4)

## 2025-07-04 PROCEDURE — 83690 ASSAY OF LIPASE: CPT

## 2025-07-04 PROCEDURE — 80307 DRUG TEST PRSMV CHEM ANLYZR: CPT

## 2025-07-04 PROCEDURE — 6360000004 HC RX CONTRAST MEDICATION: Performed by: EMERGENCY MEDICINE

## 2025-07-04 PROCEDURE — 85025 COMPLETE CBC W/AUTO DIFF WBC: CPT

## 2025-07-04 PROCEDURE — 81001 URINALYSIS AUTO W/SCOPE: CPT

## 2025-07-04 PROCEDURE — 81025 URINE PREGNANCY TEST: CPT

## 2025-07-04 PROCEDURE — 36415 COLL VENOUS BLD VENIPUNCTURE: CPT

## 2025-07-04 PROCEDURE — 99285 EMERGENCY DEPT VISIT HI MDM: CPT

## 2025-07-04 PROCEDURE — 96374 THER/PROPH/DIAG INJ IV PUSH: CPT

## 2025-07-04 PROCEDURE — 80053 COMPREHEN METABOLIC PANEL: CPT

## 2025-07-04 PROCEDURE — 6360000002 HC RX W HCPCS: Performed by: EMERGENCY MEDICINE

## 2025-07-04 PROCEDURE — 74177 CT ABD & PELVIS W/CONTRAST: CPT

## 2025-07-04 PROCEDURE — 2580000003 HC RX 258: Performed by: EMERGENCY MEDICINE

## 2025-07-04 PROCEDURE — 96361 HYDRATE IV INFUSION ADD-ON: CPT

## 2025-07-04 PROCEDURE — 82330 ASSAY OF CALCIUM: CPT

## 2025-07-04 PROCEDURE — 96375 TX/PRO/DX INJ NEW DRUG ADDON: CPT

## 2025-07-04 RX ORDER — DICYCLOMINE HCL 20 MG
20 TABLET ORAL
Qty: 20 TABLET | Refills: 0 | Status: SHIPPED | OUTPATIENT
Start: 2025-07-04 | End: 2025-07-04

## 2025-07-04 RX ORDER — IOPAMIDOL 755 MG/ML
100 INJECTION, SOLUTION INTRAVASCULAR
Status: COMPLETED | OUTPATIENT
Start: 2025-07-04 | End: 2025-07-04

## 2025-07-04 RX ORDER — PROCHLORPERAZINE EDISYLATE 5 MG/ML
10 INJECTION INTRAMUSCULAR; INTRAVENOUS ONCE
Status: COMPLETED | OUTPATIENT
Start: 2025-07-04 | End: 2025-07-04

## 2025-07-04 RX ORDER — ONDANSETRON 4 MG/1
4 TABLET, ORALLY DISINTEGRATING ORAL 3 TIMES DAILY PRN
Qty: 21 TABLET | Refills: 0 | Status: SHIPPED | OUTPATIENT
Start: 2025-07-04 | End: 2025-07-04

## 2025-07-04 RX ORDER — DICYCLOMINE HCL 20 MG
20 TABLET ORAL
Qty: 20 TABLET | Refills: 0 | Status: SHIPPED | OUTPATIENT
Start: 2025-07-04

## 2025-07-04 RX ORDER — DIPHENHYDRAMINE HYDROCHLORIDE 50 MG/ML
50 INJECTION, SOLUTION INTRAMUSCULAR; INTRAVENOUS
Status: COMPLETED | OUTPATIENT
Start: 2025-07-04 | End: 2025-07-04

## 2025-07-04 RX ORDER — KETOROLAC TROMETHAMINE 30 MG/ML
30 INJECTION, SOLUTION INTRAMUSCULAR; INTRAVENOUS
Status: COMPLETED | OUTPATIENT
Start: 2025-07-04 | End: 2025-07-04

## 2025-07-04 RX ORDER — 0.9 % SODIUM CHLORIDE 0.9 %
1000 INTRAVENOUS SOLUTION INTRAVENOUS ONCE
Status: COMPLETED | OUTPATIENT
Start: 2025-07-04 | End: 2025-07-04

## 2025-07-04 RX ORDER — ONDANSETRON 4 MG/1
4 TABLET, ORALLY DISINTEGRATING ORAL 3 TIMES DAILY PRN
Qty: 21 TABLET | Refills: 0 | Status: SHIPPED | OUTPATIENT
Start: 2025-07-04

## 2025-07-04 RX ORDER — 0.9 % SODIUM CHLORIDE 0.9 %
1000 INTRAVENOUS SOLUTION INTRAVENOUS ONCE
Status: DISCONTINUED | OUTPATIENT
Start: 2025-07-04 | End: 2025-07-04 | Stop reason: HOSPADM

## 2025-07-04 RX ADMIN — HYDROMORPHONE HYDROCHLORIDE 0.5 MG: 1 INJECTION, SOLUTION INTRAMUSCULAR; INTRAVENOUS; SUBCUTANEOUS at 06:05

## 2025-07-04 RX ADMIN — SODIUM CHLORIDE 1000 ML: 0.9 INJECTION, SOLUTION INTRAVENOUS at 04:29

## 2025-07-04 RX ADMIN — KETOROLAC TROMETHAMINE 30 MG: 30 INJECTION, SOLUTION INTRAMUSCULAR at 04:30

## 2025-07-04 RX ADMIN — IOPAMIDOL 100 ML: 755 INJECTION, SOLUTION INTRAVENOUS at 06:16

## 2025-07-04 RX ADMIN — PROCHLORPERAZINE EDISYLATE 10 MG: 5 INJECTION INTRAMUSCULAR; INTRAVENOUS at 04:31

## 2025-07-04 RX ADMIN — SODIUM CHLORIDE 1000 ML: 0.9 INJECTION, SOLUTION INTRAVENOUS at 06:05

## 2025-07-04 RX ADMIN — DIPHENHYDRAMINE HYDROCHLORIDE 50 MG: 50 INJECTION INTRAMUSCULAR; INTRAVENOUS at 04:31

## 2025-07-04 ASSESSMENT — PAIN DESCRIPTION - LOCATION: LOCATION: BACK;ABDOMEN

## 2025-07-04 ASSESSMENT — PAIN SCALES - GENERAL
PAINLEVEL_OUTOF10: 8
PAINLEVEL_OUTOF10: 7

## 2025-07-04 ASSESSMENT — LIFESTYLE VARIABLES
HOW MANY STANDARD DRINKS CONTAINING ALCOHOL DO YOU HAVE ON A TYPICAL DAY: PATIENT DOES NOT DRINK
HOW OFTEN DO YOU HAVE A DRINK CONTAINING ALCOHOL: NEVER

## 2025-07-04 ASSESSMENT — PAIN - FUNCTIONAL ASSESSMENT: PAIN_FUNCTIONAL_ASSESSMENT: 0-10

## 2025-07-04 ASSESSMENT — PAIN DESCRIPTION - DESCRIPTORS: DESCRIPTORS: SHARP

## 2025-07-04 NOTE — ED PROVIDER NOTES
[facility] EMERGENCY DEPARTMENT  EMERGENCY DEPARTMENT ENCOUNTER      Pt Name: Rupa Mancera  MRN: [id number]  Birthdate 1985  Date of evaluation: 2025  Provider: Des Brand MD    CHIEF COMPLAINT       Chief Complaint   Patient presents with    Abdominal Pain    Back Pain         HISTORY OF PRESENT ILLNESS   (Location/Symptom, Timing/Onset, Context/Setting, Quality, Duration, Modifying Factors, Severity)  Note limiting factors.   39-year-old female, with a past medical history significant for chronic pain management, neuropathy, pancreatitis, status post appendectomy and  and splenectomy who presents to the ER for evaluation for intractable nausea, vomiting and watery nonbloody diarrhea for 2 days with a sharp and stabbing discomfort, severity 8 out of 10, radiating to the back and shoulder, without any aggravation relieving factors.  The patient stated that she has been unable to take her pain medication the other medicines as instructed because of the discomfort.  She denies any melena, hematemesis, hematochezia, fever and chills, chest pain or shortness of breath, headache, blurry vision, unusual food sources, sick contact, recent travel, prior history of the same.            Review of External Medical Records:     Nursing Notes were reviewed.    REVIEW OF SYSTEMS    (2-9 systems for level 4, 10 or more for level 5)     Review of Systems   All other systems reviewed and are negative.      Except as noted above the remainder of the review of systems was reviewed and negative.       PAST MEDICAL HISTORY     Past Medical History:   Diagnosis Date    Iron overload 2023    Neuropathy     Reportedly admitted in 2019 in [place]; felt to be related to prolonged hopsitalization.    Pancreatitis 10/20/2022    ? of Alcohol per patient    Sepsis (HCC) 2023         SURGICAL HISTORY       Past Surgical History:   Procedure Laterality Date    ACHILLES TENDON SURGERY Left 2023

## 2025-07-04 NOTE — ED TRIAGE NOTES
Pt ambulatory to triage, reports mid back pain, sharp abd pain, nausea and vomiting since yesterday morning  morning  Also endorses diarrhea that started yesterday has since resolved   Pt reports chest pain when she vomits  Denies fever, SOB or urinary symptoms

## 2025-07-09 ENCOUNTER — APPOINTMENT (OUTPATIENT)
Facility: HOSPITAL | Age: 40
End: 2025-07-09
Payer: COMMERCIAL

## 2025-07-09 ENCOUNTER — HOSPITAL ENCOUNTER (EMERGENCY)
Facility: HOSPITAL | Age: 40
Discharge: HOME | End: 2025-07-10
Attending: EMERGENCY MEDICINE
Payer: COMMERCIAL

## 2025-07-09 DIAGNOSIS — R11.2 NAUSEA AND VOMITING, UNSPECIFIED VOMITING TYPE: Primary | ICD-10-CM

## 2025-07-09 DIAGNOSIS — R10.13 ABDOMINAL PAIN, EPIGASTRIC: ICD-10-CM

## 2025-07-09 LAB
ALBUMIN SERPL-MCNC: 4.1 G/DL (ref 3.5–5)
ALBUMIN/GLOB SERPL: 1 (ref 1.1–2.2)
ALP SERPL-CCNC: 136 U/L (ref 45–117)
ALT SERPL-CCNC: 17 U/L (ref 12–78)
AMPHET UR QL SCN: NEGATIVE
ANION GAP SERPL CALC-SCNC: 7 MMOL/L (ref 2–12)
APPEARANCE UR: ABNORMAL
AST SERPL-CCNC: 15 U/L (ref 15–37)
BACTERIA URNS QL MICRO: ABNORMAL /HPF
BARBITURATES UR QL SCN: NEGATIVE
BASOPHILS # BLD: 0.11 K/UL (ref 0–0.1)
BASOPHILS NFR BLD: 0.6 % (ref 0–1)
BENZODIAZ UR QL: NEGATIVE
BILIRUB SERPL-MCNC: 0.8 MG/DL (ref 0.2–1)
BILIRUB UR QL: NEGATIVE
BUN SERPL-MCNC: 7 MG/DL (ref 6–20)
BUN/CREAT SERPL: 10 (ref 12–20)
CALCIUM SERPL-MCNC: 9.6 MG/DL (ref 8.5–10.1)
CANNABINOIDS UR QL SCN: NEGATIVE
CAOX CRY URNS QL MICRO: ABNORMAL
CHLORIDE SERPL-SCNC: 102 MMOL/L (ref 97–108)
CO2 SERPL-SCNC: 26 MMOL/L (ref 21–32)
COCAINE UR QL SCN: NEGATIVE
COLOR UR: ABNORMAL
COMMENT:: NORMAL
CREAT SERPL-MCNC: 0.69 MG/DL (ref 0.55–1.02)
DIFFERENTIAL METHOD BLD: ABNORMAL
EOSINOPHIL # BLD: 0 K/UL (ref 0–0.4)
EOSINOPHIL NFR BLD: 0 % (ref 0–7)
EPITH CASTS URNS QL MICRO: ABNORMAL /LPF
ERYTHROCYTE [DISTWIDTH] IN BLOOD BY AUTOMATED COUNT: 14.8 % (ref 11.5–14.5)
GLOBULIN SER CALC-MCNC: 4 G/DL (ref 2–4)
GLUCOSE SERPL-MCNC: 126 MG/DL (ref 65–100)
GLUCOSE UR STRIP.AUTO-MCNC: NEGATIVE MG/DL
HCG, URINE, POC: NEGATIVE
HCT VFR BLD AUTO: 42.5 % (ref 35–47)
HGB BLD-MCNC: 14.7 G/DL (ref 11.5–16)
HGB UR QL STRIP: ABNORMAL
HYALINE CASTS URNS QL MICRO: ABNORMAL /LPF (ref 0–5)
IMM GRANULOCYTES # BLD AUTO: 0.06 K/UL (ref 0–0.04)
IMM GRANULOCYTES NFR BLD AUTO: 0.3 % (ref 0–0.5)
KETONES UR QL STRIP.AUTO: ABNORMAL MG/DL
LEUKOCYTE ESTERASE UR QL STRIP.AUTO: ABNORMAL
LIPASE SERPL-CCNC: 20 U/L (ref 13–75)
LYMPHOCYTES # BLD: 2.28 K/UL (ref 0.8–3.5)
LYMPHOCYTES NFR BLD: 12.7 % (ref 12–49)
Lab: NORMAL
Lab: NORMAL
MCH RBC QN AUTO: 30.7 PG (ref 26–34)
MCHC RBC AUTO-ENTMCNC: 34.6 G/DL (ref 30–36.5)
MCV RBC AUTO: 88.7 FL (ref 80–99)
METHADONE UR QL: NEGATIVE
MONOCYTES # BLD: 1.13 K/UL (ref 0–1)
MONOCYTES NFR BLD: 6.3 % (ref 5–13)
NEGATIVE QC PASS/FAIL: NORMAL
NEUTS SEG # BLD: 14.34 K/UL (ref 1.8–8)
NEUTS SEG NFR BLD: 80.1 % (ref 32–75)
NITRITE UR QL STRIP.AUTO: NEGATIVE
NRBC # BLD: 0 K/UL (ref 0–0.01)
NRBC BLD-RTO: 0 PER 100 WBC
OPIATES UR QL: NEGATIVE
PCP UR QL: NEGATIVE
PH UR STRIP: 7 (ref 5–8)
PLATELET # BLD AUTO: 492 K/UL (ref 150–400)
PMV BLD AUTO: 10 FL (ref 8.9–12.9)
POSITIVE QC PASS/FAIL: NORMAL
POTASSIUM SERPL-SCNC: 3.5 MMOL/L (ref 3.5–5.1)
PROT SERPL-MCNC: 8.1 G/DL (ref 6.4–8.2)
PROT UR STRIP-MCNC: ABNORMAL MG/DL
RBC # BLD AUTO: 4.79 M/UL (ref 3.8–5.2)
RBC #/AREA URNS HPF: ABNORMAL /HPF (ref 0–5)
SODIUM SERPL-SCNC: 135 MMOL/L (ref 136–145)
SP GR UR REFRACTOMETRY: 1.02 (ref 1–1.03)
SPECIMEN HOLD: NORMAL
SPECIMEN HOLD: NORMAL
UROBILINOGEN UR QL STRIP.AUTO: 1 EU/DL (ref 0.2–1)
WBC # BLD AUTO: 17.9 K/UL (ref 3.6–11)
WBC URNS QL MICRO: ABNORMAL /HPF (ref 0–4)

## 2025-07-09 PROCEDURE — 80053 COMPREHEN METABOLIC PANEL: CPT

## 2025-07-09 PROCEDURE — 81001 URINALYSIS AUTO W/SCOPE: CPT

## 2025-07-09 PROCEDURE — 2580000003 HC RX 258

## 2025-07-09 PROCEDURE — 96374 THER/PROPH/DIAG INJ IV PUSH: CPT

## 2025-07-09 PROCEDURE — 36415 COLL VENOUS BLD VENIPUNCTURE: CPT

## 2025-07-09 PROCEDURE — 83690 ASSAY OF LIPASE: CPT

## 2025-07-09 PROCEDURE — 96375 TX/PRO/DX INJ NEW DRUG ADDON: CPT

## 2025-07-09 PROCEDURE — 99285 EMERGENCY DEPT VISIT HI MDM: CPT

## 2025-07-09 PROCEDURE — 6360000002 HC RX W HCPCS

## 2025-07-09 PROCEDURE — 74177 CT ABD & PELVIS W/CONTRAST: CPT

## 2025-07-09 PROCEDURE — 85025 COMPLETE CBC W/AUTO DIFF WBC: CPT

## 2025-07-09 PROCEDURE — 80307 DRUG TEST PRSMV CHEM ANLYZR: CPT

## 2025-07-09 RX ORDER — ONDANSETRON 2 MG/ML
4 INJECTION INTRAMUSCULAR; INTRAVENOUS
Status: COMPLETED | OUTPATIENT
Start: 2025-07-09 | End: 2025-07-09

## 2025-07-09 RX ORDER — IOPAMIDOL 755 MG/ML
100 INJECTION, SOLUTION INTRAVASCULAR
Status: COMPLETED | OUTPATIENT
Start: 2025-07-09 | End: 2025-07-10

## 2025-07-09 RX ORDER — 0.9 % SODIUM CHLORIDE 0.9 %
1000 INTRAVENOUS SOLUTION INTRAVENOUS ONCE
Status: COMPLETED | OUTPATIENT
Start: 2025-07-09 | End: 2025-07-10

## 2025-07-09 RX ORDER — MORPHINE SULFATE 4 MG/ML
4 INJECTION, SOLUTION INTRAMUSCULAR; INTRAVENOUS ONCE
Status: COMPLETED | OUTPATIENT
Start: 2025-07-09 | End: 2025-07-09

## 2025-07-09 RX ORDER — PROCHLORPERAZINE EDISYLATE 5 MG/ML
10 INJECTION INTRAMUSCULAR; INTRAVENOUS ONCE
Status: COMPLETED | OUTPATIENT
Start: 2025-07-09 | End: 2025-07-09

## 2025-07-09 RX ORDER — KETOROLAC TROMETHAMINE 30 MG/ML
30 INJECTION, SOLUTION INTRAMUSCULAR; INTRAVENOUS
Status: COMPLETED | OUTPATIENT
Start: 2025-07-09 | End: 2025-07-09

## 2025-07-09 RX ORDER — DIPHENHYDRAMINE HYDROCHLORIDE 50 MG/ML
25 INJECTION, SOLUTION INTRAMUSCULAR; INTRAVENOUS
Status: COMPLETED | OUTPATIENT
Start: 2025-07-09 | End: 2025-07-09

## 2025-07-09 RX ADMIN — SODIUM CHLORIDE 1000 ML: 0.9 INJECTION, SOLUTION INTRAVENOUS at 22:41

## 2025-07-09 RX ADMIN — ONDANSETRON 4 MG: 2 INJECTION, SOLUTION INTRAMUSCULAR; INTRAVENOUS at 21:31

## 2025-07-09 RX ADMIN — KETOROLAC TROMETHAMINE 30 MG: 30 INJECTION, SOLUTION INTRAMUSCULAR at 21:32

## 2025-07-09 RX ADMIN — MORPHINE SULFATE 4 MG: 4 INJECTION, SOLUTION INTRAMUSCULAR; INTRAVENOUS at 22:29

## 2025-07-09 RX ADMIN — DIPHENHYDRAMINE HYDROCHLORIDE 25 MG: 50 INJECTION INTRAMUSCULAR; INTRAVENOUS at 22:26

## 2025-07-09 RX ADMIN — PROCHLORPERAZINE EDISYLATE 10 MG: 5 INJECTION INTRAMUSCULAR; INTRAVENOUS at 22:25

## 2025-07-09 ASSESSMENT — PAIN DESCRIPTION - ORIENTATION: ORIENTATION: MID;UPPER

## 2025-07-09 ASSESSMENT — PAIN SCALES - GENERAL: PAINLEVEL_OUTOF10: 9

## 2025-07-09 ASSESSMENT — PAIN DESCRIPTION - LOCATION: LOCATION: ABDOMEN;BACK

## 2025-07-09 ASSESSMENT — PAIN - FUNCTIONAL ASSESSMENT: PAIN_FUNCTIONAL_ASSESSMENT: 0-10

## 2025-07-10 VITALS
HEART RATE: 93 BPM | DIASTOLIC BLOOD PRESSURE: 99 MMHG | OXYGEN SATURATION: 100 % | SYSTOLIC BLOOD PRESSURE: 170 MMHG | HEIGHT: 63 IN | WEIGHT: 169.31 LBS | RESPIRATION RATE: 17 BRPM | TEMPERATURE: 98.2 F | BODY MASS INDEX: 30 KG/M2

## 2025-07-10 PROCEDURE — 6370000000 HC RX 637 (ALT 250 FOR IP): Performed by: EMERGENCY MEDICINE

## 2025-07-10 PROCEDURE — 6360000004 HC RX CONTRAST MEDICATION: Performed by: EMERGENCY MEDICINE

## 2025-07-10 PROCEDURE — 87040 BLOOD CULTURE FOR BACTERIA: CPT

## 2025-07-10 PROCEDURE — 36415 COLL VENOUS BLD VENIPUNCTURE: CPT

## 2025-07-10 RX ORDER — AMOXICILLIN 500 MG/1
500 CAPSULE ORAL 2 TIMES DAILY
Qty: 20 CAPSULE | Refills: 0 | Status: SHIPPED | OUTPATIENT
Start: 2025-07-10 | End: 2025-07-20

## 2025-07-10 RX ORDER — METOCLOPRAMIDE 10 MG/1
10 TABLET ORAL 4 TIMES DAILY PRN
Qty: 28 TABLET | Refills: 0 | Status: SHIPPED | OUTPATIENT
Start: 2025-07-10 | End: 2025-07-17

## 2025-07-10 RX ORDER — ONDANSETRON 4 MG/1
4 TABLET, ORALLY DISINTEGRATING ORAL ONCE
Status: COMPLETED | OUTPATIENT
Start: 2025-07-10 | End: 2025-07-10

## 2025-07-10 RX ORDER — HYDROCODONE BITARTRATE AND ACETAMINOPHEN 5; 325 MG/1; MG/1
1 TABLET ORAL
Refills: 0 | Status: COMPLETED | OUTPATIENT
Start: 2025-07-10 | End: 2025-07-10

## 2025-07-10 RX ADMIN — HYDROCODONE BITARTRATE AND ACETAMINOPHEN 1 TABLET: 5; 325 TABLET ORAL at 01:00

## 2025-07-10 RX ADMIN — ONDANSETRON 4 MG: 4 TABLET, ORALLY DISINTEGRATING ORAL at 01:01

## 2025-07-10 RX ADMIN — IOPAMIDOL 100 ML: 755 INJECTION, SOLUTION INTRAVENOUS at 00:00

## 2025-07-10 NOTE — ED TRIAGE NOTES
Patient ambulated to triage     CC abdominal pain in epigastric that radiates to the back.     Patient reports vomiting and nausea. Denies diarrhea/constipation/fevers.     Hx splenectomy

## 2025-07-10 NOTE — DISCHARGE INSTRUCTIONS
Today your lab work revealed an elevated white count of 17.9.  This is slightly elevated from your last visit.  Imaging of your abdomen was reassuring with no signs of acute pathology.  Today we took blood cultures to evaluate for any signs of systemic infection given your asplenia.  You will receive a phone call if anything returns abnormal.  Please also take the prescribed amoxicillin as prophylaxis to cover for any potential infection.  You may also take the prescribed Reglan for any nausea or vomiting.  Please follow-up with your primary care team to discuss today's visit and return to the ED immediately with any new or worsening symptoms.

## 2025-07-10 NOTE — ED PROVIDER NOTES
[facility] EMERGENCY DEPARTMENT  EMERGENCY DEPARTMENT ENCOUNTER      Pt Name: Rupa Mancera  MRN: [id number]  Birthdate 1985  Date of evaluation: 2025  Provider: Nori Mejia PA-C    CHIEF COMPLAINT       Chief Complaint   Patient presents with    Abdominal Pain    Nausea         HISTORY OF PRESENT ILLNESS   (Location/Symptom, Timing/Onset, Context/Setting, Quality, Duration, Modifying Factors, Severity)  Note limiting factors.   40-year-old female history of splenectomy, appendectomy, hypertension, neuropathy and CRPS presenting with concerns of epigastric abdominal pain that radiates into her back that began 6 hours ago.  She is also having nonbloody emesis.  She states that this happened 1 week ago and she presented to the ER with primarily negative workup.  Patient felt fine after leaving the hospital last week until symptoms began again today. She denies any associated chest pain, shortness of breath, fevers, dysuria, headache, dizziness, extremity weakness or numbness.            Review of External Medical Records:     Nursing Notes were reviewed.    REVIEW OF SYSTEMS    (2-9 systems for level 4, 10 or more for level 5)     Review of Systems    Except as noted above the remainder of the review of systems was reviewed and negative.       PAST MEDICAL HISTORY     Past Medical History:   Diagnosis Date    Iron overload 2023    Neuropathy     Reportedly admitted in 2019 in [place]; felt to be related to prolonged hopsitalization.    Pancreatitis 10/20/2022    ? of Alcohol per patient    Sepsis (HCC) 2023         SURGICAL HISTORY       Past Surgical History:   Procedure Laterality Date    ACHILLES TENDON SURGERY Left 2023    APPENDECTOMY       SECTION      SPLENECTOMY      reportedly after a splenic rupture         CURRENT MEDICATIONS       Discharge Medication List as of 7/10/2025 12:45 AM        CONTINUE these medications which have NOT CHANGED    Details

## 2025-07-13 LAB
BACTERIA SPEC CULT: NORMAL
BACTERIA SPEC CULT: NORMAL
SERVICE CMNT-IMP: NORMAL
SERVICE CMNT-IMP: NORMAL
